# Patient Record
Sex: MALE | Race: WHITE | NOT HISPANIC OR LATINO | URBAN - METROPOLITAN AREA
[De-identification: names, ages, dates, MRNs, and addresses within clinical notes are randomized per-mention and may not be internally consistent; named-entity substitution may affect disease eponyms.]

---

## 2024-11-24 ENCOUNTER — EMERGENCY (EMERGENCY)
Facility: HOSPITAL | Age: 39
LOS: 1 days | Discharge: ANOTHER HOSPITAL | End: 2024-11-24
Attending: EMERGENCY MEDICINE | Admitting: EMERGENCY MEDICINE
Payer: COMMERCIAL

## 2024-11-24 VITALS
RESPIRATION RATE: 18 BRPM | TEMPERATURE: 98 F | HEART RATE: 80 BPM | WEIGHT: 229.28 LBS | DIASTOLIC BLOOD PRESSURE: 79 MMHG | HEIGHT: 73 IN | OXYGEN SATURATION: 99 % | SYSTOLIC BLOOD PRESSURE: 176 MMHG

## 2024-11-24 LAB
ALBUMIN SERPL ELPH-MCNC: 4.1 G/DL — SIGNIFICANT CHANGE UP (ref 3.3–5)
ALP SERPL-CCNC: 55 U/L — SIGNIFICANT CHANGE UP (ref 30–120)
ALT FLD-CCNC: 24 U/L — SIGNIFICANT CHANGE UP (ref 10–60)
ANION GAP SERPL CALC-SCNC: 12 MMOL/L — SIGNIFICANT CHANGE UP (ref 5–17)
APTT BLD: 35.5 SEC — SIGNIFICANT CHANGE UP (ref 24.5–35.6)
AST SERPL-CCNC: 22 U/L — SIGNIFICANT CHANGE UP (ref 10–40)
BASOPHILS # BLD AUTO: 0.04 K/UL — SIGNIFICANT CHANGE UP (ref 0–0.2)
BASOPHILS NFR BLD AUTO: 0.4 % — SIGNIFICANT CHANGE UP (ref 0–2)
BILIRUB SERPL-MCNC: 0.5 MG/DL — SIGNIFICANT CHANGE UP (ref 0.2–1.2)
BUN SERPL-MCNC: 14 MG/DL — SIGNIFICANT CHANGE UP (ref 7–23)
CALCIUM SERPL-MCNC: 9.6 MG/DL — SIGNIFICANT CHANGE UP (ref 8.4–10.5)
CHLORIDE SERPL-SCNC: 105 MMOL/L — SIGNIFICANT CHANGE UP (ref 96–108)
CK MB CFR SERPL CALC: 1.2 NG/ML — SIGNIFICANT CHANGE UP (ref 0–3.6)
CO2 SERPL-SCNC: 28 MMOL/L — SIGNIFICANT CHANGE UP (ref 22–31)
CREAT SERPL-MCNC: 1.07 MG/DL — SIGNIFICANT CHANGE UP (ref 0.5–1.3)
D DIMER BLD IA.RAPID-MCNC: 212 NG/ML DDU — SIGNIFICANT CHANGE UP
EGFR: 89 ML/MIN/1.73M2 — SIGNIFICANT CHANGE UP
EOSINOPHIL # BLD AUTO: 0.13 K/UL — SIGNIFICANT CHANGE UP (ref 0–0.5)
EOSINOPHIL NFR BLD AUTO: 1.3 % — SIGNIFICANT CHANGE UP (ref 0–6)
GLUCOSE SERPL-MCNC: 114 MG/DL — HIGH (ref 70–99)
HCT VFR BLD CALC: 39.4 % — SIGNIFICANT CHANGE UP (ref 39–50)
HGB BLD-MCNC: 13.6 G/DL — SIGNIFICANT CHANGE UP (ref 13–17)
IMM GRANULOCYTES NFR BLD AUTO: 0.1 % — SIGNIFICANT CHANGE UP (ref 0–0.9)
INR BLD: 1.14 RATIO — SIGNIFICANT CHANGE UP (ref 0.85–1.16)
LYMPHOCYTES # BLD AUTO: 3.62 K/UL — HIGH (ref 1–3.3)
LYMPHOCYTES # BLD AUTO: 36.2 % — SIGNIFICANT CHANGE UP (ref 13–44)
MAGNESIUM SERPL-MCNC: 2 MG/DL — SIGNIFICANT CHANGE UP (ref 1.6–2.6)
MCHC RBC-ENTMCNC: 31.1 PG — SIGNIFICANT CHANGE UP (ref 27–34)
MCHC RBC-ENTMCNC: 34.5 G/DL — SIGNIFICANT CHANGE UP (ref 32–36)
MCV RBC AUTO: 90 FL — SIGNIFICANT CHANGE UP (ref 80–100)
MONOCYTES # BLD AUTO: 0.58 K/UL — SIGNIFICANT CHANGE UP (ref 0–0.9)
MONOCYTES NFR BLD AUTO: 5.8 % — SIGNIFICANT CHANGE UP (ref 2–14)
NEUTROPHILS # BLD AUTO: 5.61 K/UL — SIGNIFICANT CHANGE UP (ref 1.8–7.4)
NEUTROPHILS NFR BLD AUTO: 56.2 % — SIGNIFICANT CHANGE UP (ref 43–77)
NRBC # BLD: 0 /100 WBCS — SIGNIFICANT CHANGE UP (ref 0–0)
NT-PROBNP SERPL-SCNC: 10 PG/ML — SIGNIFICANT CHANGE UP (ref 0–125)
PLATELET # BLD AUTO: 329 K/UL — SIGNIFICANT CHANGE UP (ref 150–400)
POTASSIUM SERPL-MCNC: 3.6 MMOL/L — SIGNIFICANT CHANGE UP (ref 3.5–5.3)
POTASSIUM SERPL-SCNC: 3.6 MMOL/L — SIGNIFICANT CHANGE UP (ref 3.5–5.3)
PROT SERPL-MCNC: 7.3 G/DL — SIGNIFICANT CHANGE UP (ref 6–8.3)
PROTHROM AB SERPL-ACNC: 13.2 SEC — SIGNIFICANT CHANGE UP (ref 9.9–13.4)
RBC # BLD: 4.38 M/UL — SIGNIFICANT CHANGE UP (ref 4.2–5.8)
RBC # FLD: 11.8 % — SIGNIFICANT CHANGE UP (ref 10.3–14.5)
SODIUM SERPL-SCNC: 145 MMOL/L — SIGNIFICANT CHANGE UP (ref 135–145)
TROPONIN I, HIGH SENSITIVITY RESULT: 8.2 NG/L — SIGNIFICANT CHANGE UP
WBC # BLD: 9.99 K/UL — SIGNIFICANT CHANGE UP (ref 3.8–10.5)
WBC # FLD AUTO: 9.99 K/UL — SIGNIFICANT CHANGE UP (ref 3.8–10.5)

## 2024-11-24 PROCEDURE — 70450 CT HEAD/BRAIN W/O DYE: CPT | Mod: 26,MC,59

## 2024-11-24 PROCEDURE — 93010 ELECTROCARDIOGRAM REPORT: CPT

## 2024-11-24 PROCEDURE — 70498 CT ANGIOGRAPHY NECK: CPT | Mod: 26,MC

## 2024-11-24 PROCEDURE — 70496 CT ANGIOGRAPHY HEAD: CPT | Mod: 26,MC

## 2024-11-24 PROCEDURE — 99285 EMERGENCY DEPT VISIT HI MDM: CPT

## 2024-11-24 PROCEDURE — 71045 X-RAY EXAM CHEST 1 VIEW: CPT | Mod: 26

## 2024-11-24 RX ORDER — SODIUM CHLORIDE 9 MG/ML
1000 INJECTION, SOLUTION INTRAMUSCULAR; INTRAVENOUS; SUBCUTANEOUS ONCE
Refills: 0 | Status: COMPLETED | OUTPATIENT
Start: 2024-11-24 | End: 2024-11-24

## 2024-11-24 RX ADMIN — SODIUM CHLORIDE 1000 MILLILITER(S): 9 INJECTION, SOLUTION INTRAMUSCULAR; INTRAVENOUS; SUBCUTANEOUS at 22:48

## 2024-11-24 NOTE — ED PROVIDER NOTE - OBJECTIVE STATEMENT
39-year-old male with no significant past medical history presents with complaint of chest pain today.  Patient states that he has had intermittent dull right-sided chest pain radiating to his mid back since 2 PM today.  Denies any chest pain at this time.  States that he has had intermittent numbness to his left jaw and been waking up at night with numbness in his hands and feet for the past 2 to 3 days.  States that the left jaw numbness was more pronounced today with the chest pain and hence came to ER for evaluation.  States that chest pain started this afternoon after he drove back from [place].  States that he has noticed mild swelling of his bilateral lower legs since yesterday.  States mother had MI when she was 48 years old.  States that he has had similar symptoms 7-8 years ago and was seen by cardiologist then and had negative workup.  Denies fever, cough, shortness of breath, palpitations, nausea, vomiting, numbness, tingling, abdominal pain, history of DVT/PE, calf pain. 39-year-old male with no significant past medical history presents with complaint of chest pain today.  Patient states that he has had intermittent dull right-sided chest pain radiating to his mid back since 2 PM today.  Denies any chest pain at this time.  States that he has had intermittent numbness to his left jaw and been waking up at night with numbness in his hands and feet for the past 2 to 3 days.  States that the left jaw numbness was more pronounced today with the chest pain and hence came to ER for evaluation. +intermittent lightheadedness. States that chest pain started this afternoon after he drove back from [place].  States that he has noticed mild swelling of his bilateral lower legs since yesterday.  States mother had MI when she was 48 years old.  States that he has had similar symptoms 7-8 years ago and was seen by cardiologist then and had negative workup.  Denies fever, cough, shortness of breath, palpitations, nausea, vomiting,  tingling, headache, vision changes, focal weakness, speech/gait changes, abdominal pain, history of DVT/PE, calf pain.

## 2024-11-24 NOTE — ED ADULT NURSE NOTE - OBJECTIVE STATEMENT
pt comes to ed c/o intermittent left sided chest pain as well as left jaw and back pain. pt denies current pain at this times. denies sob, nausea, diaphoresis, cardiac hx smoking or other complaints.

## 2024-11-24 NOTE — ED PROVIDER NOTE - PROGRESS NOTE DETAILS
Discussed case with Dr. Wolfe, pt has been accepted for transfer to [facility] (via transfer center, d/w hospitalist and ED Dr. Beal) to have MRI today for r/o CVA, Dr. Wolfe will see patient later in the day for consult

## 2024-11-24 NOTE — ED PROVIDER NOTE - CARE PLAN
Principal Discharge DX:	Facial paresthesia   1 Principal Discharge DX:	Facial paresthesia  Secondary Diagnosis:	Chest pain

## 2024-11-24 NOTE — ED PROVIDER NOTE - ATTESTATION, MLM
Lucila DODSON NP I have reviewed and confirmed nurses' notes for patient's medications, allergies, medical history, and surgical history.

## 2024-11-24 NOTE — ED PROVIDER NOTE - MUSCULOSKELETAL, MLM
Spine appears normal, range of motion is not limited, no muscle or joint tenderness, no calf tenderness or obvious swelling noted to BLE Spine appears normal, range of motion is not limited, no muscle or joint tenderness, no calf tenderness or swelling noted to BLE

## 2024-11-24 NOTE — ED ADULT NURSE REASSESSMENT NOTE - NS ED NURSE REASSESS COMMENT FT1
pt resting comfortably, improvement noted, in no acute distress. Respirations are even and unlabored. pt voices no complaints at this time. plan of care ongoing, no further concerns as of present, pt hemodynamically stable. no acute changes noted, needs attended to, safety maintained, call bell within reach.

## 2024-11-25 ENCOUNTER — EMERGENCY (EMERGENCY)
Facility: HOSPITAL | Age: 39
LOS: 1 days | Discharge: HOME | End: 2024-11-25
Attending: INTERNAL MEDICINE | Admitting: EMERGENCY MEDICINE
Payer: COMMERCIAL

## 2024-11-25 VITALS
OXYGEN SATURATION: 97 % | HEART RATE: 70 BPM | DIASTOLIC BLOOD PRESSURE: 85 MMHG | WEIGHT: 220.02 LBS | TEMPERATURE: 98 F | RESPIRATION RATE: 18 BRPM | HEIGHT: 72 IN | SYSTOLIC BLOOD PRESSURE: 128 MMHG

## 2024-11-25 VITALS
SYSTOLIC BLOOD PRESSURE: 128 MMHG | OXYGEN SATURATION: 99 % | HEART RATE: 78 BPM | TEMPERATURE: 98 F | DIASTOLIC BLOOD PRESSURE: 77 MMHG | RESPIRATION RATE: 18 BRPM

## 2024-11-25 VITALS
OXYGEN SATURATION: 98 % | RESPIRATION RATE: 16 BRPM | SYSTOLIC BLOOD PRESSURE: 115 MMHG | HEART RATE: 71 BPM | TEMPERATURE: 98 F | DIASTOLIC BLOOD PRESSURE: 71 MMHG

## 2024-11-25 LAB — TROPONIN I, HIGH SENSITIVITY RESULT: 9.1 NG/L — SIGNIFICANT CHANGE UP

## 2024-11-25 PROCEDURE — 83880 ASSAY OF NATRIURETIC PEPTIDE: CPT

## 2024-11-25 PROCEDURE — 85025 COMPLETE CBC W/AUTO DIFF WBC: CPT

## 2024-11-25 PROCEDURE — 85379 FIBRIN DEGRADATION QUANT: CPT

## 2024-11-25 PROCEDURE — 80053 COMPREHEN METABOLIC PANEL: CPT

## 2024-11-25 PROCEDURE — 99285 EMERGENCY DEPT VISIT HI MDM: CPT

## 2024-11-25 PROCEDURE — A9579: CPT

## 2024-11-25 PROCEDURE — 71045 X-RAY EXAM CHEST 1 VIEW: CPT

## 2024-11-25 PROCEDURE — 93005 ELECTROCARDIOGRAM TRACING: CPT

## 2024-11-25 PROCEDURE — 99285 EMERGENCY DEPT VISIT HI MDM: CPT | Mod: 25

## 2024-11-25 PROCEDURE — 85610 PROTHROMBIN TIME: CPT

## 2024-11-25 PROCEDURE — 83735 ASSAY OF MAGNESIUM: CPT

## 2024-11-25 PROCEDURE — 70450 CT HEAD/BRAIN W/O DYE: CPT | Mod: MC

## 2024-11-25 PROCEDURE — 96361 HYDRATE IV INFUSION ADD-ON: CPT

## 2024-11-25 PROCEDURE — 70498 CT ANGIOGRAPHY NECK: CPT | Mod: MC

## 2024-11-25 PROCEDURE — 72156 MRI NECK SPINE W/O & W/DYE: CPT | Mod: MC

## 2024-11-25 PROCEDURE — 36415 COLL VENOUS BLD VENIPUNCTURE: CPT

## 2024-11-25 PROCEDURE — 85730 THROMBOPLASTIN TIME PARTIAL: CPT

## 2024-11-25 PROCEDURE — 93010 ELECTROCARDIOGRAM REPORT: CPT

## 2024-11-25 PROCEDURE — 96360 HYDRATION IV INFUSION INIT: CPT | Mod: XU

## 2024-11-25 PROCEDURE — 70496 CT ANGIOGRAPHY HEAD: CPT | Mod: MC

## 2024-11-25 PROCEDURE — 82553 CREATINE MB FRACTION: CPT

## 2024-11-25 PROCEDURE — 99284 EMERGENCY DEPT VISIT MOD MDM: CPT | Mod: 25

## 2024-11-25 PROCEDURE — 72156 MRI NECK SPINE W/O & W/DYE: CPT | Mod: 26,MC

## 2024-11-25 PROCEDURE — 96374 THER/PROPH/DIAG INJ IV PUSH: CPT | Mod: XU

## 2024-11-25 PROCEDURE — 70553 MRI BRAIN STEM W/O & W/DYE: CPT | Mod: 26,MC

## 2024-11-25 PROCEDURE — 84484 ASSAY OF TROPONIN QUANT: CPT

## 2024-11-25 PROCEDURE — 70553 MRI BRAIN STEM W/O & W/DYE: CPT | Mod: MC

## 2024-11-25 RX ORDER — DIAZEPAM 10 MG/1
5 FILM BUCCAL ONCE
Refills: 0 | Status: DISCONTINUED | OUTPATIENT
Start: 2024-11-25 | End: 2024-11-25

## 2024-11-25 RX ORDER — ASPIRIN/MAG CARB/ALUMINUM AMIN 325 MG
324 TABLET ORAL ONCE
Refills: 0 | Status: COMPLETED | OUTPATIENT
Start: 2024-11-25 | End: 2024-11-25

## 2024-11-25 RX ADMIN — SODIUM CHLORIDE 1000 MILLILITER(S): 9 INJECTION, SOLUTION INTRAMUSCULAR; INTRAVENOUS; SUBCUTANEOUS at 01:01

## 2024-11-25 RX ADMIN — DIAZEPAM 5 MILLIGRAM(S): 10 FILM BUCCAL at 11:37

## 2024-11-25 RX ADMIN — Medication 324 MILLIGRAM(S): at 01:05

## 2024-11-25 NOTE — CONSULT NOTE ADULT - ATTENDING COMMENTS
38 yo male w/o significant pmh presenting with facial paresthesias and atypical chest pain.    - Atypical chest pain  - No clear evidence of acute ischemia, trops negative x 2.  - Biomarker trend is not consistent with plaque rupture but rather demand ischemia.   - s/p ASA 324mg  - Last followed up with a cardiologist in [place] and states he had "normal" Echocardiogram and Exercise stress test.  - Patient will need outpatient follow up as he no longer has a Cardiologist since moving to [place].

## 2024-11-25 NOTE — ED PROVIDER NOTE - PATIENT PORTAL LINK FT
You can access the FollowMyHealth Patient Portal offered by [facility] by registering at the following website: [email or web address]. By joining [employer]’s FollowMyHealth portal, you will also be able to view your health information using other applications (apps) compatible with our system.

## 2024-11-25 NOTE — CHART NOTE - NSCHARTNOTEFT_GEN_A_CORE
39 Y.O.M transferred from [facility] to obtain MRI as unavailable in [facility] currently   pt had numbness on L side of face on and off since  and some numbness of toes since also Friday on and off also complained that he primarily presented to [facility] as he experienced chest pain moving to back that occurred associated with some dizziness  , no associated nausea, vomiting , palpitations    not alcoholic , no smoking , No PMH , the risk factor is FH mother  of heart attack at age of 48  EKG done in [place] 2 times both NSR with no ST-T wave changes   troponin negative x2   CT head w contrast and CTA neck done in [place] are negative for any findings  currently in ED patient stated that his numbness as well as chest pain resolved   patient pending neuro eval , MRI and cardio to assess if patient will require admission 39 Y.O.M transferred from [facility] to obtain MRI as unavailable in [facility] currently   pt had numbness on L side of face on and off since  and some numbness of toes since also Friday on and off also complained that he primarily presented to [facility] as he experienced chest pain moving to back that occurred associated with some dizziness  , no associated nausea, vomiting , palpitations    not alcoholic , no smoking , No PMH , the risk factor is FH mother  of heart attack at age of 48  EKG done in [place] 2 times both NSR with no ST-T wave changes   troponin negative x2   CT head w contrast and CTA neck done in [place] are negative for any findings  currently in ED patient stated that his numbness as well as chest pain resolved   on exam patient neurologically intact , sensation intact , no focal deficit   lung clear breath sounds b/l , heart s1/s2 present , no murmur , rubs or gallops   abd: soft not tender or distended   ext: no edema or cyanosis , strength in all extremities 5/5 , sensation intact  PERRL , EMOI , vision intact   no rashes   patient pending neuro eval , MRI and cardio to assess if patient will require admission

## 2024-11-25 NOTE — ED PROVIDER NOTE - CARE PROVIDER_API CALL
Hua Abreu-Pineda  Neurology  [address] [place] [id number]  Phone: [phone]  Fax: [phone]  Follow Up Time:

## 2024-11-25 NOTE — CONSULT NOTE ADULT - SUBJECTIVE AND OBJECTIVE BOX
[facility]         Siddhartha Mckeon Patel, Savella, Virgilio      [phone] (office)    Reason for Consult: Chest Pain    Interval HPI: Patient seen and examined at bedside. Patient states that he had traveled to [place] 4 days ago by car to see a concert. States that he was very active in his stay there and drove back to [place] yesterday. States that he started to notice dull chest pain at the lower aspect of the right side of his sternum. States the pain lasted for a few minutes and happened spontaneously. Did not notice any alleviating/exacerbating factor. States that prior to that in the day he was on an exercise bike and had no difficulty before or after the workout. States that his mother passed away of an AMI at age 48 so he used to see Cardiology when he lived in [place] 8-9 years ago and had normal stress tests and echocardiograms but has not followed up since. States that late yesterday into today, he develop jaw "tingling" which last multiple hours. States that he never develop SOB, palpitations, dizziness, lightheadedness, abd pain, n/v/d/c.    HPI:  39-year-old male with no significant past medical history presents with complaint of chest pain today.  Patient states that he has had intermittent dull right-sided chest pain radiating to his mid back since 2 PM today.  Denies any chest pain at this time.  States that he has had intermittent numbness to his left jaw and been waking up at night with numbness in his hands and feet for the past 2 to 3 days.  States that the left jaw numbness was more pronounced today.  The patient was first evaluated at [facility], and found to have normal ekg and troponin x 2 , CT angio  head and neck normal, Neuro was consulted and decision was made for transfer to   to obtain MRI      PAST MEDICAL & SURGICAL HISTORY:      SOCIAL HISTORY: No active tobacco, alcohol or illicit drug use    FAMILY HISTORY:      Home Medications:      MEDICATIONS  (STANDING):    MEDICATIONS  (PRN):      Allergies      Intolerances        REVIEW OF SYSTEMS: Negative except as per HPI.    VITAL SIGNS:   Vital Signs Last 24 Hrs  T(C): 36.6 (25 Nov 2024 07:37), Max: 36.6 (25 Nov 2024 03:21)  T(F): 97.8 (25 Nov 2024 07:37), Max: 97.8 (25 Nov 2024 03:21)  HR: 63 (25 Nov 2024 07:37) (63 - 70)  BP: 120/79 (25 Nov 2024 07:37) (120/79 - 128/85)  BP(mean): --  RR: 18 (25 Nov 2024 07:37) (18 - 18)  SpO2: 98% (25 Nov 2024 07:37) (97% - 98%)    Parameters below as of 25 Nov 2024 07:37  Patient On (Oxygen Delivery Method): room air        I&O's Summary      PHYSICAL EXAM:  Constitutional: NAD, well-developed  HEENT NC/AT, moist mucous membranes  Pulmonary: Non-labored, breath sounds are clear bilaterally, no wheezing, rales or rhonchi  Cardiovascular: +S1, S2, RRR, no murmur  Gastrointestinal: Soft, nontender, nondistended, normoactive bowel sounds  Extremities: No peripheral edema   Neurological: Alert, strength and sensitivity are grossly intact  Skin: No obvious lesions/rashes  Psych: Mood & affect appropriate    LABS: All Labs Reviewed:          EKG:  NSR @ 62bpm      RADIOLOGY:  CTA Head and Neck: Negative for significant stenosis, acute bleed.

## 2024-11-25 NOTE — ED ADULT NURSE REASSESSMENT NOTE - NS ED NURSE REASSESS COMMENT FT1
Pt received resring on stretcher in n oa apparent distress. All need anticipated. Awaiting MRI, no concerns voiced

## 2024-11-25 NOTE — ED PROVIDER NOTE - NSFOLLOWUPINSTRUCTIONS_ED_ALL_ED_FT
-- You should update your primary care physician on your Emergency Department visit and follow up with them.  If you do not have a physician or have difficulty following up, please call: [phone] to obtain a [facility] doctor or specialist who can provide follow up.    -- Return to the ER for worsening or persistent symptoms, and/or ANY NEW OR CONCERNING SYMPTOMS.    -- Follow up with neurology referral.

## 2024-11-25 NOTE — ED PROVIDER NOTE - OBJECTIVE STATEMENT
BIBA Transfer from [facility] for MRI for left facial numbness.  multiple medical complaints BIBA Transfer from [facility] for MRI for left facial numbness.  39-year-old male with no significant past medical history presents with complaint of chest pain today.  Patient states that he has had intermittent dull right-sided chest pain radiating to his mid back since 2 PM today.  Denies any chest pain at this time.  States that he has had intermittent numbness to his left jaw and been waking up at night with numbness in his hands and feet for the past 2 to 3 days.  States that the left jaw numbness was more pronounced today.  The patient was first evaluated at [facility], and found to have normal ekg and troponin x 2 , CT angio  head and neck normal, Neuro was consulted and decision was made for transfer to   to obtain MRI

## 2024-11-25 NOTE — CONSULT NOTE ADULT - ASSESSMENT
Assessment: 38 yo male w/o significant pmh presenting with facial paresthesias and atypical chest pain.    Plan:   - Atypical chest pain dull in nature w/o exacerbating/palliating factors.   - No clear evidence of acute ischemia, trops negative x 2.  - His CKs are flat, suggesting against acute atherosclerotic plaque rupture.  - Biomarker trend is not consistent with plaque rupture but rather demand ischemia. Monitor closely for the development of anginal symptoms or clinical signs of ischemia.   - s/p ASA 324mg  - Risk factors: Mother passed away of MI at age 48, Denies any smoking/tobacco use, alcohol use, illicit drug use.  - Last followed up with a cardiologist in [place] and states he had "normal" Echocardiogram and Exercise stress test.    - No acute changes on EKG compared to previous.    - No meaningful evidence of volume overload.    - BP well controlled, monitor routine hemodynamics.    - Monitor and replete lytes, keep K>4, Mg>2.    - CTA Head and Neck negative. D-Dimer negative.  - Consider neurologic etiology of paresthesias.  - Other cardiovascular workup will depend on clinical course.  - Patient will need outpatient follow up as he no longer has a Cardiologist since moving to [place].

## 2024-11-25 NOTE — ED PROVIDER NOTE - CLINICAL SUMMARY MEDICAL DECISION MAKING FREE TEXT BOX
BIBA Transfer from [facility] for MRI for left facial numbness.  39-year-old male with no significant past medical history presents with complaint of chest pain today.  Patient states that he has had intermittent dull right-sided chest pain radiating to his mid back since 2 PM today.  Denies any chest pain at this time.  States that he has had intermittent numbness to his left jaw and been waking up at night with numbness in his hands and feet for the past 2 to 3 days.  States that the left jaw numbness was more pronounced today.  The patient was first evaluated at [facility], and found to have normal ekg and troponin x 2 , CT angio  head and neck normal, Neuro was consulted and decision was made for transfer to   to obtain MRI

## 2024-11-25 NOTE — ED ADULT NURSE NOTE - NSFALLUNIVINTERV_ED_ALL_ED
Bed/Stretcher in lowest position, wheels locked, appropriate side rails in place/Call bell, personal items and telephone in reach/Instruct patient to call for assistance before getting out of bed/chair/stretcher/Non-slip footwear applied when patient is off stretcher/[place] to call system/Physically safe environment - no spills, clutter or unnecessary equipment/Purposeful proactive rounding/Room/bathroom lighting operational, light cord in reach

## 2025-01-10 ENCOUNTER — EMERGENCY (EMERGENCY)
Facility: HOSPITAL | Age: 40
LOS: 1 days | Discharge: HOME | End: 2025-01-10
Attending: STUDENT IN AN ORGANIZED HEALTH CARE EDUCATION/TRAINING PROGRAM | Admitting: EMERGENCY MEDICINE
Payer: COMMERCIAL

## 2025-01-10 VITALS
TEMPERATURE: 98 F | DIASTOLIC BLOOD PRESSURE: 95 MMHG | HEIGHT: 72 IN | HEART RATE: 69 BPM | RESPIRATION RATE: 18 BRPM | WEIGHT: 210.1 LBS | SYSTOLIC BLOOD PRESSURE: 173 MMHG | OXYGEN SATURATION: 98 %

## 2025-01-10 PROCEDURE — 99285 EMERGENCY DEPT VISIT HI MDM: CPT

## 2025-01-10 NOTE — ED ADULT NURSE NOTE - OBJECTIVE STATEMENT
Pt states he was informed of a CAD diagnosis 2 weeks ago. Was informed he had 50% blockage in LAD, mother  of heart attack. Stated since diagnosis have been fearful, Has known history of anxiety, Has been prescribed Lexapro, but was unsure if he could take it with other prescribed meds. Stated he has mild SOB past 2 days with chest pain mid chest non radiating. denies fever or chills. denies nausea or vomiting. Denies weakness or palpitations.

## 2025-01-10 NOTE — ED ADULT NURSE NOTE - NSFALLRISKINTERV_ED_ALL_ED

## 2025-01-10 NOTE — ED ADULT TRIAGE NOTE - NS ED TRIAGE AVPU SCALE
IV Contrast- Discharge Instructions After Your CT Scan      The IV contrast you received today will be filtered from your bloodstream by your kidneys during the next 24 hours and pass from the body in urine.  You will not be aware of this process and your urine will not change in color.  To help this process you should drink at least 4 additional glasses of water or juice today.  This reduces stress on your kidneys.    Most contrast reactions are immediate.  Should you develop symptoms of concern after discharge, contact the department at the number below.  After hours you should contact your personal physician.  If you develop breathing distress or wheezing, call 911.      1.  Has the patient had a previous reaction to IV contrast? n    2.  Does the patient have kidney disease? n    3.  Is the patient on dialysis? n    If YES to any of these questions, exam will be reviewed with a Radiologist before administering contrast.       Alert-The patient is alert, awake and responds to voice. The patient is oriented to time, place, and person. The triage nurse is able to obtain subjective information.

## 2025-01-11 VITALS
TEMPERATURE: 98 F | RESPIRATION RATE: 16 BRPM | SYSTOLIC BLOOD PRESSURE: 144 MMHG | DIASTOLIC BLOOD PRESSURE: 91 MMHG | OXYGEN SATURATION: 100 % | HEART RATE: 56 BPM

## 2025-01-11 LAB
ALBUMIN SERPL ELPH-MCNC: 4.5 G/DL — SIGNIFICANT CHANGE UP (ref 3.3–5)
ALP SERPL-CCNC: 49 U/L — SIGNIFICANT CHANGE UP (ref 40–120)
ALT FLD-CCNC: 25 U/L — SIGNIFICANT CHANGE UP (ref 12–78)
ANION GAP SERPL CALC-SCNC: 3 MMOL/L — LOW (ref 5–17)
AST SERPL-CCNC: 17 U/L — SIGNIFICANT CHANGE UP (ref 15–37)
BASOPHILS # BLD AUTO: 0.02 K/UL — SIGNIFICANT CHANGE UP (ref 0–0.2)
BASOPHILS NFR BLD AUTO: 0.3 % — SIGNIFICANT CHANGE UP (ref 0–2)
BILIRUB SERPL-MCNC: 0.5 MG/DL — SIGNIFICANT CHANGE UP (ref 0.2–1.2)
BUN SERPL-MCNC: 12 MG/DL — SIGNIFICANT CHANGE UP (ref 7–23)
CALCIUM SERPL-MCNC: 9.5 MG/DL — SIGNIFICANT CHANGE UP (ref 8.5–10.1)
CHLORIDE SERPL-SCNC: 107 MMOL/L — SIGNIFICANT CHANGE UP (ref 96–108)
CO2 SERPL-SCNC: 29 MMOL/L — SIGNIFICANT CHANGE UP (ref 22–31)
CREAT SERPL-MCNC: 0.88 MG/DL — SIGNIFICANT CHANGE UP (ref 0.5–1.3)
EGFR: 111 ML/MIN/1.73M2 — SIGNIFICANT CHANGE UP
EOSINOPHIL # BLD AUTO: 0.05 K/UL — SIGNIFICANT CHANGE UP (ref 0–0.5)
EOSINOPHIL NFR BLD AUTO: 0.6 % — SIGNIFICANT CHANGE UP (ref 0–6)
GLUCOSE SERPL-MCNC: 99 MG/DL — SIGNIFICANT CHANGE UP (ref 70–99)
HCT VFR BLD CALC: 40.9 % — SIGNIFICANT CHANGE UP (ref 39–50)
HGB BLD-MCNC: 14 G/DL — SIGNIFICANT CHANGE UP (ref 13–17)
IMM GRANULOCYTES NFR BLD AUTO: 0.3 % — SIGNIFICANT CHANGE UP (ref 0–0.9)
LYMPHOCYTES # BLD AUTO: 2.72 K/UL — SIGNIFICANT CHANGE UP (ref 1–3.3)
LYMPHOCYTES # BLD AUTO: 34.5 % — SIGNIFICANT CHANGE UP (ref 13–44)
MCHC RBC-ENTMCNC: 31.6 PG — SIGNIFICANT CHANGE UP (ref 27–34)
MCHC RBC-ENTMCNC: 34.2 G/DL — SIGNIFICANT CHANGE UP (ref 32–36)
MCV RBC AUTO: 92.3 FL — SIGNIFICANT CHANGE UP (ref 80–100)
MONOCYTES # BLD AUTO: 0.51 K/UL — SIGNIFICANT CHANGE UP (ref 0–0.9)
MONOCYTES NFR BLD AUTO: 6.5 % — SIGNIFICANT CHANGE UP (ref 2–14)
NEUTROPHILS # BLD AUTO: 4.57 K/UL — SIGNIFICANT CHANGE UP (ref 1.8–7.4)
NEUTROPHILS NFR BLD AUTO: 57.8 % — SIGNIFICANT CHANGE UP (ref 43–77)
NRBC # BLD: 0 /100 WBCS — SIGNIFICANT CHANGE UP (ref 0–0)
PLATELET # BLD AUTO: 341 K/UL — SIGNIFICANT CHANGE UP (ref 150–400)
POTASSIUM SERPL-MCNC: 4.6 MMOL/L — SIGNIFICANT CHANGE UP (ref 3.5–5.3)
POTASSIUM SERPL-SCNC: 4.6 MMOL/L — SIGNIFICANT CHANGE UP (ref 3.5–5.3)
PROT SERPL-MCNC: 7.5 G/DL — SIGNIFICANT CHANGE UP (ref 6–8.3)
RBC # BLD: 4.43 M/UL — SIGNIFICANT CHANGE UP (ref 4.2–5.8)
RBC # FLD: 12 % — SIGNIFICANT CHANGE UP (ref 10.3–14.5)
SODIUM SERPL-SCNC: 139 MMOL/L — SIGNIFICANT CHANGE UP (ref 135–145)
TROPONIN I, HIGH SENSITIVITY RESULT: 5.2 NG/L — SIGNIFICANT CHANGE UP
WBC # BLD: 7.89 K/UL — SIGNIFICANT CHANGE UP (ref 3.8–10.5)
WBC # FLD AUTO: 7.89 K/UL — SIGNIFICANT CHANGE UP (ref 3.8–10.5)

## 2025-01-11 PROCEDURE — 85025 COMPLETE CBC W/AUTO DIFF WBC: CPT

## 2025-01-11 PROCEDURE — 80053 COMPREHEN METABOLIC PANEL: CPT

## 2025-01-11 PROCEDURE — 93010 ELECTROCARDIOGRAM REPORT: CPT

## 2025-01-11 PROCEDURE — 71045 X-RAY EXAM CHEST 1 VIEW: CPT

## 2025-01-11 PROCEDURE — 71045 X-RAY EXAM CHEST 1 VIEW: CPT | Mod: 26

## 2025-01-11 PROCEDURE — 36415 COLL VENOUS BLD VENIPUNCTURE: CPT

## 2025-01-11 PROCEDURE — 84484 ASSAY OF TROPONIN QUANT: CPT

## 2025-01-11 PROCEDURE — 93005 ELECTROCARDIOGRAM TRACING: CPT

## 2025-01-11 PROCEDURE — 99285 EMERGENCY DEPT VISIT HI MDM: CPT | Mod: 25

## 2025-01-11 NOTE — ED PROVIDER NOTE - IV ALTEPLASE DOOR HIDDEN
Quality 131: Pain Assessment And Follow-Up: Pain assessment using a standardized tool is documented as negative, no follow-up plan required Quality 226: Preventive Care And Screening: Tobacco Use: Screening And Cessation Intervention: Patient screened for tobacco use and is an ex/non-smoker Quality 110: Preventive Care And Screening: Influenza Immunization: Influenza Immunization previously received during influenza season Quality 130: Documentation Of Current Medications In The Medical Record: Current Medications Documented Detail Level: Detailed show

## 2025-01-11 NOTE — ED PROVIDER NOTE - CLINICAL SUMMARY MEDICAL DECISION MAKING FREE TEXT BOX
[place] ATTG  39 M w/ recent dx of cad 50% stenosis 2 weeks ago pt has family hx of cad, pt has been feeling anxious about such, experienced some anxiety he said which caused him cp for the past 2-3 days  minimal active symptoms       GENERAL: Awake, alert, NAD  LUNGS:  non labored breathing   ABDOMEN: Soft, , non tender, non distended, no rebound, no guarding  BACK: No midline spinal tenderness,  EXT: No edema, no calf tenderness no deformities.  NEURO: A&Ox3. Moving all extremities.  SKIN: Warm and dry. No rash.  PSYCH: Normal affect.     given hx and pe w/ cp going on > 3 hours single trop dc rec card fu

## 2025-01-11 NOTE — ED ADULT NURSE REASSESSMENT NOTE - NS ED NURSE REASSESS COMMENT FT1
Discharged with instruct to follow up with PCP. Return to ED if symptoms return> verbalized understanding

## 2025-01-11 NOTE — ED PROVIDER NOTE - CARE PROVIDER_API CALL
Go Carl  Cardiology  [address] [place] [address]  Phone: [phone]  Fax: [phone]  Follow Up Time: 4-6 Days

## 2025-01-11 NOTE — ED ADULT NURSE REASSESSMENT NOTE - NSFALLRISK_ED_ALL_ED
OFFICE VISIT    Patient: Supa Santiago   : 1981 MRN: [id number]    SUBJECTIVE:  Chief Complaint   Patient presents with   • Follow-up       Patient has given consent to record this visit for documentation in their clinical record.    A 41 year old male presents for follow-up of multiple medical conditions.        HISTORY OF PRESENT ILLNESS:  Historian: Self.     Vaccination: Due for Hepatitis-B and Influenza vaccine. He would like to get both vaccines done today. He is declining his COVID booster.      RUQ pain: Occasional stomach pain and heartburn. Previous Ultrasound Abdomen revealed no abnormalities. Had gluten free chicken nuggets for lunch. Denies nausea or vomiting. Has gallbladder issues. He is wondering what more can be done as it seems to be getting worse.      Diarrhea: Reports diarrhea, couple of times a week, otherwise his bowel movements are normal. Denies difficulty urinating.     Snoring: Snores while sleeping. No difficulty in breathing during sleep. Was scheduled for a sleep study but was denied by insurance. I discussed trying to do a home sleep study and see if his insurance would cover this.      Excessive daytime sleepiness: Experiences fatigue during the day and requires a nap. Was scheduled for a sleep study but was denied by insurance.      Headache: Occasional headaches on waking up in the morning.   Additional comments    Genital herpes: Taking Valacyclovir occasionally. Flare-ups minimal.      Anxiety: LANI-7 score is 12. Unsure which medication was prescribed earlier.      Social history: Non-alcoholic.        PAST MEDICAL HISTORY:  Past Medical History:   Diagnosis Date   • Gastroesophageal reflux disease    • PONV (postoperative nausea and vomiting)      MEDICATIONS:  Current Outpatient Medications   Medication Sig Dispense Refill   • traMADol (ULTRAM) 50 MG tablet Take 1 tablet by mouth every 6 hours as needed for Pain. 15 tablet 0   • busPIRone (BUSPAR) 15 MG tablet Take 1 tablet  by mouth in the morning and 1 tablet in the evening. 60 tablet 1   • zaleplon (SONATA) 5 MG capsule Take 1 capsule by mouth before bed for sleep study. May take second dose if 4 hours of sleep remain. 2 capsule 0   • omeprazole (PrilOSEC) 20 MG capsule Take 1 capsule by mouth 2 times daily. 180 capsule 3   • atorvastatin (LIPITOR) 20 MG tablet Take 1 tablet by mouth daily. 90 tablet 3   • COVID-19 mRNA, Pfizer, (Pfizer-Veysoft COVID-19 Vacc) 30 MCG/0.3ML vaccine Inject 0.3 mLs into the muscle. 0.3 mL 0     No current facility-administered medications for this visit.     ALLERGIES:  Allergies as of 01/10/2023 - Reviewed 01/10/2023   Allergen Reaction Noted   • Ezetimibe Other (See Comments) 03/09/2018   • Rosuvastatin MYALGIA 03/09/2018     FAMILY HISTORY:  Family History   Problem Relation Age of Onset   • Hyperlipidemia Mother    • Cancer Mother    • Dementia/Alzheimers Mother    • Hypertension Mother    • Depression Father    • Diabetes Father    • Hypertension Father      SOCIAL HISTORY:  Social History     Tobacco Use   • Smoking status: Never   • Smokeless tobacco: Never   Vaping Use   • Vaping Use: never used   Substance Use Topics   • Alcohol use: Not Currently   • Drug use: Never     Past Surgical HISTORY  Past Surgical History:   Procedure Laterality Date   • Appendectomy     • Egd  03/08/2022    + Lim's, repeat EGD in 2 yrs   • Hernia repair         REVIEW OF SYSTEMS:  Neuro: As per HPI.  GI: As per HPI.  : As per HPI.  Psych: As per HPI.      OBJECTIVE:  Visit Vitals  /80 (BP Location: LUE - Left upper extremity, Patient Position: Sitting, Cuff Size: Regular)   Pulse 76   Temp 98.2 °F (36.8 °C) (Temporal)   Ht 5' 7\" (1.702 m)   Wt 88 kg (194 lb)   SpO2 96%   BMI 30.38 kg/m²       PHYSICAL EXAM:  Constitutional: In no acute distress. Well-developed.   Eyes: The conjunctiva exhibited no abnormalities. The sclera was normal.   Pulmonary: Breath sounds clear to auscultation bilaterally, but no  respiratory distress and normal respiratory rate and effort.  Cardiovascular: normal rate, no murmurs were heard, regular rhythm, normal S1 and normal S2. edema was not present in the lower extremities.     Abdomen: (+) Pain on taking a deep breath. Weak abdominal muscles. Nondistended, normal bowel sounds and no abdominal mass. No hepatomegaly and no splenomegaly. No umbilical hernia was discovered. RUQ tenderness with palpation.  Psychiatric: (+) Anxiety. Oriented to time, place, and person. The mood was normal. The affect was normal. The memory was unimpaired.   Skin: Skin moisture and turgor normal.      DIAGNOSTIC STUDIES:  LAB RESULTS:  No visits with results within 1 Month(s) from this visit.   Latest known visit with results is:   Lab Services on 08/16/2022   Component Date Value Ref Range Status   • Vitamin B12 08/16/2022 784  211 - 911 pg/mL Final   • Vitamin D, 25-Hydroxy 08/16/2022 31.6  30.0 - 100.0 ng/mL Final   • Hemoglobin A1C 08/16/2022 5.6  4.5 - 5.6 % Final   • Cholesterol 08/16/2022 191  <=199 mg/dL Final   • Triglycerides 08/16/2022 226 (A)  <=149 mg/dL Final   • HDL 08/16/2022 37 (A)  >=40 mg/dL Final   • LDL 08/16/2022 109  <=129 mg/dL Final   • Non-HDL Cholesterol 08/16/2022 154  mg/dL Final   • Cholesterol/ HDL Ratio 08/16/2022 5.2 (A)  <=4.4 Final   • Sodium 08/16/2022 139  135 - 145 mmol/L Final   • Potassium 08/16/2022 4.0  3.4 - 5.1 mmol/L Final   • Chloride 08/16/2022 106  97 - 110 mmol/L Final   • Carbon Dioxide 08/16/2022 26  21 - 32 mmol/L Final   • Anion Gap 08/16/2022 11  7 - 19 mmol/L Final   • Glucose 08/16/2022 82  70 - 99 mg/dL Final   • BUN 08/16/2022 15  6 - 20 mg/dL Final   • Creatinine 08/16/2022 1.01  0.67 - 1.17 mg/dL Final   • Glomerular Filtration Rate 08/16/2022 >90  >=60 Final   • BUN/ Creatinine Ratio 08/16/2022 15  7 - 25 Final   • Calcium 08/16/2022 9.3  8.4 - 10.2 mg/dL Final   • Bilirubin, Total 08/16/2022 1.0  0.2 - 1.0 mg/dL Final   • GOT/AST 08/16/2022 64 (A)   <=37 Units/L Final   • GPT/ALT 08/16/2022 158 (A)  <64 Units/L Final   • Alkaline Phosphatase 08/16/2022 75  45 - 117 Units/L Final   • Albumin 08/16/2022 4.1  3.6 - 5.1 g/dL Final   • Protein, Total 08/16/2022 7.5  6.4 - 8.2 g/dL Final   • Globulin 08/16/2022 3.4  2.0 - 4.0 g/dL Final   • A/G Ratio 08/16/2022 1.2  1.0 - 2.4 Final   • TSH 08/16/2022 1.006  0.350 - 5.000 mcUnits/mL Final   • WBC 08/16/2022 6.5  4.2 - 11.0 K/mcL Final   • RBC 08/16/2022 5.08  4.50 - 5.90 mil/mcL Final   • HGB 08/16/2022 14.8  13.0 - 17.0 g/dL Final   • HCT 08/16/2022 45.3  39.0 - 51.0 % Final   • MCV 08/16/2022 89.2  78.0 - 100.0 fl Final   • MCH 08/16/2022 29.1  26.0 - 34.0 pg Final   • MCHC 08/16/2022 32.7  32.0 - 36.5 g/dL Final   • RDW-CV 08/16/2022 12.3  11.0 - 15.0 % Final   • RDW-SD 08/16/2022 40.4  39.0 - 50.0 fL Final   • PLT 08/16/2022 202  140 - 450 K/mcL Final   • NRBC 08/16/2022 0  <=0 /100 WBC Final   • Neutrophil, Percent 08/16/2022 47  % Final   • Lymphocytes, Percent 08/16/2022 41  % Final   • Mono, Percent 08/16/2022 9  % Final   • Eosinophils, Percent 08/16/2022 2  % Final   • Basophils, Percent 08/16/2022 1  % Final   • Immature Granulocytes 08/16/2022 0  % Final   • Absolute Neutrophils 08/16/2022 3.1  1.8 - 7.7 K/mcL Final   • Absolute Lymphocytes 08/16/2022 2.6  1.0 - 4.8 K/mcL Final   • Absolute Monocytes 08/16/2022 0.6  0.3 - 0.9 K/mcL Final   • Absolute Eosinophils  08/16/2022 0.1  0.0 - 0.5 K/mcL Final   • Absolute Basophils 08/16/2022 0.1  0.0 - 0.3 K/mcL Final   • Absolute Immmature Granulocytes 08/16/2022 0.0  0.0 - 0.2 K/mcL Final       ASSESSMENT AND PLAN:  This 41 year old male presents with :  1. Need for vaccination    2. Generalized abdominal pain    3. Abnormal biliary HIDA scan        Orders Placed This Encounter   • HEP B VACC ADULT 3 DOSE, IM       PLAN:  Need for vaccination:  Ordered INFLUENZA QUADRIVALENT SPLIT PRES FREE 0.5 ML VACC, IM (FLULAVAL,FLUARIX,FLUZONE).  Ordered HEP B VACC ADULT 3  DOSE, IM.     RUQ pain:  Possible gallbladder issues.  Reviewed and discussed previous Ultrasound Abdomen.  Reviewed HIDA scan results.  Recommended avoiding fried and fatty foods.  Surgical consult as directed.     Diarrhea, unspecified type:  Reviewed HIDA scan results.   Surgical consult as directed.      Snoring:  Ordered SLEEP STUDY HOME 4 CHANNEL PORTABLE UNATTENDED.     Excessive daytime sleepiness:  Ordered SLEEP STUDY HOME 4 CHANNEL PORTABLE UNATTENDED.     Other headache syndrome:  Ordered SLEEP STUDY HOME 4 CHANNEL PORTABLE UNATTENDED.     Additional plan:  Anxiety:   Discussed previous Anxiety scores, LANI-7 score ranges 12.  Prescribed Buspirone 15 mg; twice daily.     Follow up one month or sooner if needed.    Total time spent today on this visit  is  30 minutes which includes  preparing to see the patient by reviewing prior records, obtaining and reviewing history, performing a physical exam, counseling the patient ,documenting clinical information in the medical record,ordering medications/tests/procedures and coordinating care      Refer to orders.  Medical compliance with plan discussed and risks of non-compliance reviewed.  Patient education completed on disease process, etiology & prognosis.  Proper usage and side effects of medications reviewed & discussed.  Return to clinic as clinically indicated as discussed with the patient who verbalized understanding of the plan and is in agreement with the plan.    I,  Dr. FABIEN Cueva, have created a visit summary document based on the audio recording between  AIDAN Sahu and this patient for the physician to review, edit as needed, and authenticate.    Creation Date: 12/10/2022      I have reviewed and edited the visit summary above and attest that it is accurate.    Sandie BERMUDEZ   No

## 2025-01-11 NOTE — ED PROVIDER NOTE - NSFOLLOWUPINSTRUCTIONS_ED_ALL_ED_FT
Chest pain can be caused by a range of conditions, from not serious to life-threatening. Chest pain can be a symptom of a digestive problem, such as acid reflux or a stomach ulcer. An anxiety attack or a strong emotion, such as anger, can also cause chest pain. Infection, inflammation, or a fracture in the bones or cartilage in your chest can cause pain or discomfort. Sometimes chest pain or pressure is caused by poor blood flow to your heart (angina). Chest pain may also be caused by life-threatening conditions such as a heart attack or blood clot in your lungs.    DISCHARGE INSTRUCTIONS:    Call your local emergency number (911 in the ) or have someone call if:    You have any of the following signs of a heart attack:  Squeezing, pressure, or pain in your chest    You may also have any of the following:  Discomfort or pain in your back, neck, jaw, stomach, or arm    Shortness of breath    Nausea or vomiting    Lightheadedness or a sudden cold sweat    Return to the emergency department if:    You have chest discomfort that gets worse, even with medicine.    You cough or vomit blood.    Your bowel movements are black or bloody.    You cannot stop vomiting, or it hurts to swallow.  Call your doctor if:    You have questions or concerns about your condition or care.    Medicines:    Medicines may be given to treat the cause of your chest pain. Examples include pain medicine, anxiety medicine, or medicines to increase blood flow to your heart.    Do not take certain medicines without asking your healthcare provider first. These include NSAIDs, herbal or vitamin supplements, and hormones, such as estrogen or progestin.    Take your medicine as directed. Contact your healthcare provider if you think your medicine is not helping or if you have side effects. Tell your provider if you are allergic to any medicine. Keep a list of the medicines, vitamins, and herbs you take. Include the amounts, and when and why you take them. Bring the list or the pill bottles to follow-up visits. Carry your medicine list with you in case of an emergency.  Healthy living tips: If the cause of your chest pain is known, your healthcare provider will give you specific guidelines to follow. The following are general healthy guidelines:    Do not smoke. Nicotine and other chemicals in cigarettes and cigars can cause lung and heart damage. Ask your healthcare provider for information if you currently smoke and need help to quit. E-cigarettes or smokeless tobacco still contain nicotine. Talk to your healthcare provider before you use these products.    Choose a variety of healthy foods as often as possible. Include fresh, frozen, or canned fruits and vegetables. Also include low-fat dairy products, fish, chicken (without skin), and lean meats. Your healthcare provider or a dietitian can help you create meal plans. You may need to avoid certain foods or drinks if your pain is caused by a digestion problem.  Healthy Foods      Lower your sodium (salt) intake. Limit foods that are high in sodium, such as canned foods, salty snacks, and cold cuts. If you add salt when you cook food, do not add more at the table. Choose low-sodium canned foods as much as possible.        Drink plenty of water every day. Water helps your body to control your temperature and blood pressure. Ask your healthcare provider how much water you should drink every day.    Ask about activity. Your healthcare provider will tell you which activities to limit or avoid. Ask when you can drive, return to work, and have sex. Ask about the best exercise plan for you.    Maintain a healthy weight. Ask your healthcare provider what a healthy weight is for you. Ask him or her to help you create a safe weight loss plan if you are overweight.    Ask about vaccines you may need. Your healthcare provider can tell you which vaccines you need, and when to get them. The following vaccines help prevent diseases that can become serious for a person with a heart condition:  The influenza (flu) vaccine is given each year. Get a flu vaccine as soon as recommended, usually in September or October.    The pneumonia vaccine is usually given every 5 years. Your healthcare provider may recommend the pneumonia vaccine if you are 65 or older.    COVID-19 vaccines are given to adults as a shot. At least 1 dose of an updated vaccine is recommended for all adults. COVID-19 vaccines are updated throughout the year. Adults 65 or older need a second dose of updated vaccine at least 4 months after the first dose. Your healthcare provider can help you schedule all needed doses as updated vaccines become available.  Prevent Heart Disease   Follow up with your doctor within 72 hours, or as directed: You may need to return for more tests to find the cause of your chest pain. You may be referred to a specialist, such as a cardiologist or gastroenterologist. Write down your questions so you remember to ask them during your visits.

## 2025-01-11 NOTE — ED ADULT NURSE REASSESSMENT NOTE - NURSING MUSC ROM
Theresa Gomez is an 43 year old female presents to urgent care for left posterior rib pain that began 5 days ago after she slipped off a stretching piece of equipment while at work and landed on her left posterior ribs.  Patient denies shortness or breath, chest pain, difficulty breathing however she does note increased pain with deep breathing and movement.  Pain was improving over the past few days however last night when she went to bed she believes she may have slept awkwardly and then woke up with increased pain.  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  Believes the motion of the bus and sleeping situation may be contributing to her increase in symptoms.  Denies cough, hemoptysis, leg swelling, spinal tenderness.  Denies hitting head or having back pain or neck pain.  Denies any spinal tenderness, no numbness or tingling no changes in bowel or bladder function    History reviewed. No pertinent past medical history.  History reviewed. No pertinent surgical history.  History reviewed. No pertinent family history.  Social History     Tobacco Use    Smoking status: Never    Smokeless tobacco: Never   Substance Use Topics    Alcohol use: Not on file       Prior to Admission Meds:(Not in a hospital admission)    Scheduled Meds:  No current facility-administered medications for this visit.     Continuous Infusions:  No current facility-administered medications for this visit.     PRN Meds:  No current facility-administered medications for this visit.       Allergies:   ALLERGIES:   Allergen Reactions    Cephalexin RASH    Sulfa Antibiotics RASH       Active Problems:    * No active hospital problems. *    Blood pressure 128/87, pulse 80, temperature 98 °F (36.7 °C), temperature source Oral, resp. rate 18, SpO2 100 %.    Review of Systems   Constitutional: Negative.    HENT: Negative.     Eyes: Negative.    Respiratory: Negative.  Negative for chest tightness and shortness of  breath.    Cardiovascular: Negative.  Negative for chest pain.   Gastrointestinal: Negative.    Endocrine: Negative.    Genitourinary: Negative.    Musculoskeletal:  Negative for back pain and neck pain.        Right posterior rib pain/injury   Hematological: Negative.    Psychiatric/Behavioral: Negative.          Physical Exam  Constitutional:       Appearance: Normal appearance.   HENT:      Head: Normocephalic.      Right Ear: Tympanic membrane, ear canal and external ear normal.      Left Ear: Tympanic membrane, ear canal and external ear normal.      Mouth/Throat:      Mouth: Mucous membranes are moist.      Pharynx: Oropharynx is clear.   Eyes:      Pupils: Pupils are equal, round, and reactive to light.   Cardiovascular:      Rate and Rhythm: Normal rate.   Pulmonary:      Effort: Pulmonary effort is normal. No respiratory distress.      Breath sounds: Normal breath sounds. No wheezing or rales.   Abdominal:      General: Abdomen is flat.      Palpations: Abdomen is soft.      Tenderness: There is no abdominal tenderness.   Musculoskeletal:      Cervical back: Neck supple. No rigidity or tenderness.      Thoracic back: Tenderness present.      Comments: No spinal tenderness or cervical tenderness noted.  Right lateral rib tenderness noted with palpation.  No hematoma lacerations or abrasions noted to skin.  No obvious rib deformities noted.    Neurological:      Mental Status: She is alert.         Assessment:  Rib injury  (primary encounter diagnosis)  Plan: XR RIBS 2 VIEWS LEFT CHEST 1 VIEW        Plan:  Right posterior rib injury after sliding off off a stretching machine at work and hitting area of tenderness on another part of the machine.  Has been living/working on a tour bus and feels sleeping on bus has contributed to her symptoms not improving.  No red flag symptoms including did not hit head no chest pain no shortness a breath no numbness or tingling and no spinal tenderness.  Patient concerned  about continuing work, would like x-ray to evaluate for rib fractures.  Rib series obtained without evidence of any acute abnormalities including fractures.  We will prescribe Flexeril to help with muscle pain/spasms.  Patient also encouraged to continue taking Tylenol and ibuprofen as needed for pain management.  Patient given worker's comp form including limitations for work-activity as tolerated without provoking pain.  Patient agreeable with plan of care and denies further questions    SKYE Kumari  2/8/2024   full range of motion in all extremities

## 2025-01-11 NOTE — ED PROVIDER NOTE - PATIENT PORTAL LINK FT
You can access the FollowMyHealth Patient Portal offered by [facility] by registering at the following website: [email or web address]. By joining [facility]’s FollowMyHealth portal, you will also be able to view your health information using other applications (apps) compatible with our system.

## 2025-01-11 NOTE — ED ADULT NURSE REASSESSMENT NOTE - NSFALLUNIVINTERV_ED_ALL_ED
Bed/Stretcher in lowest position, wheels locked, appropriate side rails in place/Call bell, personal items and telephone in reach/Instruct patient to call for assistance before getting out of bed/chair/stretcher/Non-slip footwear applied when patient is off stretcher/[place] to call system/Physically safe environment - no spills, clutter or unnecessary equipment/Purposeful proactive rounding/Room/bathroom lighting operational, light cord in reach

## 2025-01-13 PROBLEM — Z00.00 ENCOUNTER FOR PREVENTIVE HEALTH EXAMINATION: Status: ACTIVE | Noted: 2025-01-13

## 2025-01-14 ENCOUNTER — APPOINTMENT (OUTPATIENT)
Dept: CARDIOLOGY | Facility: CLINIC | Age: 40
End: 2025-01-14
Payer: COMMERCIAL

## 2025-01-14 ENCOUNTER — NON-APPOINTMENT (OUTPATIENT)
Age: 40
End: 2025-01-14

## 2025-01-14 ENCOUNTER — TRANSCRIPTION ENCOUNTER (OUTPATIENT)
Age: 40
End: 2025-01-14

## 2025-01-14 VITALS
HEIGHT: 73 IN | DIASTOLIC BLOOD PRESSURE: 78 MMHG | HEART RATE: 66 BPM | SYSTOLIC BLOOD PRESSURE: 132 MMHG | BODY MASS INDEX: 26.24 KG/M2 | WEIGHT: 198 LBS | OXYGEN SATURATION: 87 %

## 2025-01-14 VITALS — DIASTOLIC BLOOD PRESSURE: 70 MMHG | SYSTOLIC BLOOD PRESSURE: 118 MMHG

## 2025-01-14 DIAGNOSIS — Z82.49 FAMILY HISTORY OF ISCHEMIC HEART DISEASE AND OTHER DISEASES OF THE CIRCULATORY SYSTEM: ICD-10-CM

## 2025-01-14 DIAGNOSIS — I25.10 ATHEROSCLEROTIC HEART DISEASE OF NATIVE CORONARY ARTERY W/OUT ANGINA PECTORIS: ICD-10-CM

## 2025-01-14 DIAGNOSIS — E78.5 HYPERLIPIDEMIA, UNSPECIFIED: ICD-10-CM

## 2025-01-14 DIAGNOSIS — F41.9 ANXIETY DISORDER, UNSPECIFIED: ICD-10-CM

## 2025-01-14 PROCEDURE — G2211 COMPLEX E/M VISIT ADD ON: CPT | Mod: NC

## 2025-01-14 PROCEDURE — 93000 ELECTROCARDIOGRAM COMPLETE: CPT

## 2025-01-14 PROCEDURE — 99215 OFFICE O/P EST HI 40 MIN: CPT

## 2025-01-14 RX ORDER — ESCITALOPRAM OXALATE 5 MG/1
5 TABLET ORAL
Qty: 90 | Refills: 1 | Status: ACTIVE | COMMUNITY
Start: 2025-01-14

## 2025-01-14 RX ORDER — PRAVASTATIN SODIUM 10 MG/1
10 TABLET ORAL
Qty: 30 | Refills: 3 | Status: DISCONTINUED | COMMUNITY
Start: 2025-01-14 | End: 2025-01-14

## 2025-01-14 RX ORDER — ROSUVASTATIN CALCIUM 20 MG/1
20 TABLET, FILM COATED ORAL
Qty: 90 | Refills: 0 | Status: ACTIVE | COMMUNITY
Start: 2025-01-14

## 2025-01-14 RX ORDER — ASPIRIN 81 MG/1
81 TABLET, COATED ORAL
Qty: 90 | Refills: 3 | Status: ACTIVE | COMMUNITY
Start: 2025-01-14

## 2025-01-15 ENCOUNTER — EMERGENCY (EMERGENCY)
Facility: HOSPITAL | Age: 40
LOS: 1 days | Discharge: HOME | End: 2025-01-15
Attending: STUDENT IN AN ORGANIZED HEALTH CARE EDUCATION/TRAINING PROGRAM | Admitting: STUDENT IN AN ORGANIZED HEALTH CARE EDUCATION/TRAINING PROGRAM
Payer: COMMERCIAL

## 2025-01-15 VITALS
HEART RATE: 77 BPM | RESPIRATION RATE: 14 BRPM | TEMPERATURE: 98 F | DIASTOLIC BLOOD PRESSURE: 77 MMHG | HEIGHT: 72 IN | OXYGEN SATURATION: 98 % | SYSTOLIC BLOOD PRESSURE: 136 MMHG | WEIGHT: 205.03 LBS

## 2025-01-15 LAB
ALBUMIN SERPL ELPH-MCNC: 3.8 G/DL — SIGNIFICANT CHANGE UP (ref 3.3–5)
ALP SERPL-CCNC: 47 U/L — SIGNIFICANT CHANGE UP (ref 40–120)
ALT FLD-CCNC: 26 U/L — SIGNIFICANT CHANGE UP (ref 12–78)
ANION GAP SERPL CALC-SCNC: 7 MMOL/L — SIGNIFICANT CHANGE UP (ref 5–17)
APTT BLD: 36.1 SEC — HIGH (ref 24.5–35.6)
AST SERPL-CCNC: 15 U/L — SIGNIFICANT CHANGE UP (ref 15–37)
BASOPHILS # BLD AUTO: 0.02 K/UL — SIGNIFICANT CHANGE UP (ref 0–0.2)
BASOPHILS NFR BLD AUTO: 0.2 % — SIGNIFICANT CHANGE UP (ref 0–2)
BILIRUB SERPL-MCNC: 0.4 MG/DL — SIGNIFICANT CHANGE UP (ref 0.2–1.2)
BUN SERPL-MCNC: 15 MG/DL — SIGNIFICANT CHANGE UP (ref 7–23)
CALCIUM SERPL-MCNC: 9 MG/DL — SIGNIFICANT CHANGE UP (ref 8.5–10.1)
CHLORIDE SERPL-SCNC: 105 MMOL/L — SIGNIFICANT CHANGE UP (ref 96–108)
CO2 SERPL-SCNC: 27 MMOL/L — SIGNIFICANT CHANGE UP (ref 22–31)
CREAT SERPL-MCNC: 0.9 MG/DL — SIGNIFICANT CHANGE UP (ref 0.5–1.3)
EGFR: 110 ML/MIN/1.73M2 — SIGNIFICANT CHANGE UP
EGFR: 110 ML/MIN/1.73M2 — SIGNIFICANT CHANGE UP
EOSINOPHIL # BLD AUTO: 0.06 K/UL — SIGNIFICANT CHANGE UP (ref 0–0.5)
EOSINOPHIL NFR BLD AUTO: 0.6 % — SIGNIFICANT CHANGE UP (ref 0–6)
GLUCOSE SERPL-MCNC: 112 MG/DL — HIGH (ref 70–99)
HCT VFR BLD CALC: 38.1 % — LOW (ref 39–50)
HGB BLD-MCNC: 13 G/DL — SIGNIFICANT CHANGE UP (ref 13–17)
IMM GRANULOCYTES NFR BLD AUTO: 0.2 % — SIGNIFICANT CHANGE UP (ref 0–0.9)
INR BLD: 1.17 RATIO — HIGH (ref 0.85–1.16)
LIDOCAIN IGE QN: 37 U/L — SIGNIFICANT CHANGE UP (ref 13–75)
LYMPHOCYTES # BLD AUTO: 1.66 K/UL — SIGNIFICANT CHANGE UP (ref 1–3.3)
LYMPHOCYTES # BLD AUTO: 16.9 % — SIGNIFICANT CHANGE UP (ref 13–44)
MCHC RBC-ENTMCNC: 31.3 PG — SIGNIFICANT CHANGE UP (ref 27–34)
MCHC RBC-ENTMCNC: 34.1 G/DL — SIGNIFICANT CHANGE UP (ref 32–36)
MCV RBC AUTO: 91.6 FL — SIGNIFICANT CHANGE UP (ref 80–100)
MONOCYTES # BLD AUTO: 0.45 K/UL — SIGNIFICANT CHANGE UP (ref 0–0.9)
MONOCYTES NFR BLD AUTO: 4.6 % — SIGNIFICANT CHANGE UP (ref 2–14)
NEUTROPHILS # BLD AUTO: 7.6 K/UL — HIGH (ref 1.8–7.4)
NEUTROPHILS NFR BLD AUTO: 77.5 % — HIGH (ref 43–77)
NRBC # BLD: 0 /100 WBCS — SIGNIFICANT CHANGE UP (ref 0–0)
NRBC BLD-RTO: 0 /100 WBCS — SIGNIFICANT CHANGE UP (ref 0–0)
NT-PROBNP SERPL-SCNC: 11 PG/ML — SIGNIFICANT CHANGE UP (ref 0–125)
PLATELET # BLD AUTO: 344 K/UL — SIGNIFICANT CHANGE UP (ref 150–400)
POTASSIUM SERPL-MCNC: 4.6 MMOL/L — SIGNIFICANT CHANGE UP (ref 3.5–5.3)
POTASSIUM SERPL-SCNC: 4.6 MMOL/L — SIGNIFICANT CHANGE UP (ref 3.5–5.3)
PROT SERPL-MCNC: 6.9 G/DL — SIGNIFICANT CHANGE UP (ref 6–8.3)
PROTHROM AB SERPL-ACNC: 13.7 SEC — HIGH (ref 9.9–13.4)
RBC # BLD: 4.16 M/UL — LOW (ref 4.2–5.8)
RBC # FLD: 12.1 % — SIGNIFICANT CHANGE UP (ref 10.3–14.5)
SODIUM SERPL-SCNC: 139 MMOL/L — SIGNIFICANT CHANGE UP (ref 135–145)
TROPONIN I, HIGH SENSITIVITY RESULT: 6.1 NG/L — SIGNIFICANT CHANGE UP
TROPONIN I, HIGH SENSITIVITY RESULT: 6.9 NG/L — SIGNIFICANT CHANGE UP
WBC # BLD: 9.81 K/UL — SIGNIFICANT CHANGE UP (ref 3.8–10.5)
WBC # FLD AUTO: 9.81 K/UL — SIGNIFICANT CHANGE UP (ref 3.8–10.5)

## 2025-01-15 PROCEDURE — 85025 COMPLETE CBC W/AUTO DIFF WBC: CPT

## 2025-01-15 PROCEDURE — 83690 ASSAY OF LIPASE: CPT

## 2025-01-15 PROCEDURE — 93005 ELECTROCARDIOGRAM TRACING: CPT

## 2025-01-15 PROCEDURE — 36415 COLL VENOUS BLD VENIPUNCTURE: CPT

## 2025-01-15 PROCEDURE — 84484 ASSAY OF TROPONIN QUANT: CPT

## 2025-01-15 PROCEDURE — 83880 ASSAY OF NATRIURETIC PEPTIDE: CPT

## 2025-01-15 PROCEDURE — 93010 ELECTROCARDIOGRAM REPORT: CPT

## 2025-01-15 PROCEDURE — 85610 PROTHROMBIN TIME: CPT

## 2025-01-15 PROCEDURE — 71046 X-RAY EXAM CHEST 2 VIEWS: CPT

## 2025-01-15 PROCEDURE — 71046 X-RAY EXAM CHEST 2 VIEWS: CPT | Mod: 26

## 2025-01-15 PROCEDURE — 80053 COMPREHEN METABOLIC PANEL: CPT

## 2025-01-15 PROCEDURE — 85730 THROMBOPLASTIN TIME PARTIAL: CPT

## 2025-01-15 PROCEDURE — 99285 EMERGENCY DEPT VISIT HI MDM: CPT | Mod: 25

## 2025-01-15 PROCEDURE — 99285 EMERGENCY DEPT VISIT HI MDM: CPT

## 2025-01-15 RX ADMIN — Medication 1000 MILLILITER(S): at 22:12

## 2025-01-15 RX ADMIN — Medication 1000 MILLILITER(S): at 22:02

## 2025-01-16 VITALS
TEMPERATURE: 98 F | SYSTOLIC BLOOD PRESSURE: 125 MMHG | DIASTOLIC BLOOD PRESSURE: 78 MMHG | HEART RATE: 77 BPM | RESPIRATION RATE: 16 BRPM | OXYGEN SATURATION: 99 %

## 2025-01-20 ENCOUNTER — EMERGENCY (EMERGENCY)
Facility: HOSPITAL | Age: 40
LOS: 1 days | Discharge: HOME | End: 2025-01-20
Attending: STUDENT IN AN ORGANIZED HEALTH CARE EDUCATION/TRAINING PROGRAM | Admitting: STUDENT IN AN ORGANIZED HEALTH CARE EDUCATION/TRAINING PROGRAM
Payer: COMMERCIAL

## 2025-01-20 VITALS
DIASTOLIC BLOOD PRESSURE: 83 MMHG | HEART RATE: 90 BPM | SYSTOLIC BLOOD PRESSURE: 136 MMHG | OXYGEN SATURATION: 96 % | RESPIRATION RATE: 15 BRPM | TEMPERATURE: 98 F | HEIGHT: 72 IN | WEIGHT: 199.96 LBS

## 2025-01-20 VITALS
HEART RATE: 61 BPM | OXYGEN SATURATION: 94 % | SYSTOLIC BLOOD PRESSURE: 113 MMHG | DIASTOLIC BLOOD PRESSURE: 76 MMHG | TEMPERATURE: 98 F | RESPIRATION RATE: 18 BRPM

## 2025-01-20 LAB
ALBUMIN SERPL ELPH-MCNC: 4.2 G/DL — SIGNIFICANT CHANGE UP (ref 3.3–5)
ALP SERPL-CCNC: 55 U/L — SIGNIFICANT CHANGE UP (ref 30–120)
ALT FLD-CCNC: 30 U/L — SIGNIFICANT CHANGE UP (ref 10–60)
ANION GAP SERPL CALC-SCNC: 9 MMOL/L — SIGNIFICANT CHANGE UP (ref 5–17)
AST SERPL-CCNC: 21 U/L — SIGNIFICANT CHANGE UP (ref 10–40)
BASOPHILS # BLD AUTO: 0.03 K/UL — SIGNIFICANT CHANGE UP (ref 0–0.2)
BASOPHILS NFR BLD AUTO: 0.3 % — SIGNIFICANT CHANGE UP (ref 0–2)
BILIRUB SERPL-MCNC: 0.4 MG/DL — SIGNIFICANT CHANGE UP (ref 0.2–1.2)
BUN SERPL-MCNC: 11 MG/DL — SIGNIFICANT CHANGE UP (ref 7–23)
CALCIUM SERPL-MCNC: 9.6 MG/DL — SIGNIFICANT CHANGE UP (ref 8.4–10.5)
CHLORIDE SERPL-SCNC: 103 MMOL/L — SIGNIFICANT CHANGE UP (ref 96–108)
CO2 SERPL-SCNC: 28 MMOL/L — SIGNIFICANT CHANGE UP (ref 22–31)
CREAT SERPL-MCNC: 1.02 MG/DL — SIGNIFICANT CHANGE UP (ref 0.5–1.3)
EGFR: 95 ML/MIN/1.73M2 — SIGNIFICANT CHANGE UP
EOSINOPHIL # BLD AUTO: 0.1 K/UL — SIGNIFICANT CHANGE UP (ref 0–0.5)
EOSINOPHIL NFR BLD AUTO: 1 % — SIGNIFICANT CHANGE UP (ref 0–6)
GLUCOSE SERPL-MCNC: 119 MG/DL — HIGH (ref 70–99)
HCT VFR BLD CALC: 42.1 % — SIGNIFICANT CHANGE UP (ref 39–50)
HGB BLD-MCNC: 14.3 G/DL — SIGNIFICANT CHANGE UP (ref 13–17)
IMM GRANULOCYTES NFR BLD AUTO: 0.2 % — SIGNIFICANT CHANGE UP (ref 0–0.9)
LYMPHOCYTES # BLD AUTO: 2.53 K/UL — SIGNIFICANT CHANGE UP (ref 1–3.3)
LYMPHOCYTES # BLD AUTO: 25.7 % — SIGNIFICANT CHANGE UP (ref 13–44)
MAGNESIUM SERPL-MCNC: 2.2 MG/DL — SIGNIFICANT CHANGE UP (ref 1.6–2.6)
MCHC RBC-ENTMCNC: 31 PG — SIGNIFICANT CHANGE UP (ref 27–34)
MCHC RBC-ENTMCNC: 34 G/DL — SIGNIFICANT CHANGE UP (ref 32–36)
MCV RBC AUTO: 91.1 FL — SIGNIFICANT CHANGE UP (ref 80–100)
MONOCYTES # BLD AUTO: 0.58 K/UL — SIGNIFICANT CHANGE UP (ref 0–0.9)
MONOCYTES NFR BLD AUTO: 5.9 % — SIGNIFICANT CHANGE UP (ref 2–14)
NEUTROPHILS # BLD AUTO: 6.58 K/UL — SIGNIFICANT CHANGE UP (ref 1.8–7.4)
NEUTROPHILS NFR BLD AUTO: 66.9 % — SIGNIFICANT CHANGE UP (ref 43–77)
NRBC # BLD: 0 /100 WBCS — SIGNIFICANT CHANGE UP (ref 0–0)
NT-PROBNP SERPL-SCNC: 12 PG/ML — SIGNIFICANT CHANGE UP (ref 0–125)
PLATELET # BLD AUTO: 397 K/UL — SIGNIFICANT CHANGE UP (ref 150–400)
POTASSIUM SERPL-MCNC: 4.3 MMOL/L — SIGNIFICANT CHANGE UP (ref 3.5–5.3)
POTASSIUM SERPL-SCNC: 4.3 MMOL/L — SIGNIFICANT CHANGE UP (ref 3.5–5.3)
PROT SERPL-MCNC: 7.6 G/DL — SIGNIFICANT CHANGE UP (ref 6–8.3)
RBC # BLD: 4.62 M/UL — SIGNIFICANT CHANGE UP (ref 4.2–5.8)
RBC # FLD: 12 % — SIGNIFICANT CHANGE UP (ref 10.3–14.5)
SODIUM SERPL-SCNC: 140 MMOL/L — SIGNIFICANT CHANGE UP (ref 135–145)
TROPONIN I, HIGH SENSITIVITY RESULT: 4.8 NG/L — SIGNIFICANT CHANGE UP
WBC # BLD: 9.84 K/UL — SIGNIFICANT CHANGE UP (ref 3.8–10.5)
WBC # FLD AUTO: 9.84 K/UL — SIGNIFICANT CHANGE UP (ref 3.8–10.5)

## 2025-01-20 PROCEDURE — 85025 COMPLETE CBC W/AUTO DIFF WBC: CPT

## 2025-01-20 PROCEDURE — 93010 ELECTROCARDIOGRAM REPORT: CPT

## 2025-01-20 PROCEDURE — 96374 THER/PROPH/DIAG INJ IV PUSH: CPT

## 2025-01-20 PROCEDURE — 99285 EMERGENCY DEPT VISIT HI MDM: CPT | Mod: 25

## 2025-01-20 PROCEDURE — 93005 ELECTROCARDIOGRAM TRACING: CPT

## 2025-01-20 PROCEDURE — 83735 ASSAY OF MAGNESIUM: CPT

## 2025-01-20 PROCEDURE — 99285 EMERGENCY DEPT VISIT HI MDM: CPT

## 2025-01-20 PROCEDURE — 36415 COLL VENOUS BLD VENIPUNCTURE: CPT

## 2025-01-20 PROCEDURE — 83880 ASSAY OF NATRIURETIC PEPTIDE: CPT

## 2025-01-20 PROCEDURE — 80053 COMPREHEN METABOLIC PANEL: CPT

## 2025-01-20 PROCEDURE — 71045 X-RAY EXAM CHEST 1 VIEW: CPT

## 2025-01-20 PROCEDURE — 84484 ASSAY OF TROPONIN QUANT: CPT

## 2025-01-20 PROCEDURE — 71045 X-RAY EXAM CHEST 1 VIEW: CPT | Mod: 26

## 2025-01-20 RX ORDER — SODIUM CHLORIDE 9 MG/ML
1000 INJECTION, SOLUTION INTRAMUSCULAR; INTRAVENOUS; SUBCUTANEOUS ONCE
Refills: 0 | Status: COMPLETED | OUTPATIENT
Start: 2025-01-20 | End: 2025-01-20

## 2025-01-20 RX ORDER — ACETAMINOPHEN 80 MG/.8ML
1000 SOLUTION/ DROPS ORAL ONCE
Refills: 0 | Status: COMPLETED | OUTPATIENT
Start: 2025-01-20 | End: 2025-01-20

## 2025-01-20 RX ORDER — ALPRAZOLAM 0.25 MG/1
0.5 TABLET ORAL ONCE
Refills: 0 | Status: DISCONTINUED | OUTPATIENT
Start: 2025-01-20 | End: 2025-01-20

## 2025-01-20 RX ADMIN — SODIUM CHLORIDE 1000 MILLILITER(S): 9 INJECTION, SOLUTION INTRAMUSCULAR; INTRAVENOUS; SUBCUTANEOUS at 19:41

## 2025-01-20 RX ADMIN — ACETAMINOPHEN 400 MILLIGRAM(S): 80 SOLUTION/ DROPS ORAL at 19:41

## 2025-01-20 RX ADMIN — ALPRAZOLAM 0.5 MILLIGRAM(S): 0.25 TABLET ORAL at 19:41

## 2025-01-20 NOTE — ED PROVIDER NOTE - CARE PROVIDER_API CALL
Go Carl  Cardiology  [address] [place] [address]  Phone: [phone]  Fax: [phone]  Follow Up Time:

## 2025-01-20 NOTE — ED ADULT NURSE NOTE - PRIMARY CARE PROVIDER
HISTORY OF PRESENT ILLNESS  Umberto Brunner is a 78 y.o. female     SUMMARY:   Problem List  Date Reviewed: 1/10/2019          Codes Class Noted    Type 2 diabetes with nephropathy ([facility] ICD-10-CM: E11.21  ICD-9-CM: 250.40, 583.81  8/20/2018        Acute respiratory failure ([facility] ICD-10-CM: J96.00  ICD-9-CM: 518.81  6/21/2018        Elevated troponin ICD-10-CM: R74.8  ICD-9-CM: 790.6  6/21/2018        CAD (coronary artery disease) ICD-10-CM: I25.10  ICD-9-CM: 414.00  6/21/2018        Acute renal failure superimposed on stage 3 chronic kidney disease ([facility] ICD-10-CM: N17.9, N18.3  ICD-9-CM: 584.9, 585.3  6/21/2018        Gout flare ICD-10-CM: M10.9  ICD-9-CM: 274.01  6/21/2018        Hyperglycemia due to type 2 diabetes mellitus ([facility] ICD-10-CM: E11.65  ICD-9-CM: 250.00  6/21/2018        Metastatic breast cancer ([place] [address].) ICD-10-CM: C50.919  ICD-9-CM: 174.9  6/1/2018        Goals of care, counseling/discussion ICD-10-CM: Z71.89  ICD-9-CM: V65.49  6/1/2018        Acute on chronic systolic HF (heart failure) ([facility] ICD-10-CM: I50.23  ICD-9-CM: 428.23  5/19/2018        Acute systolic heart failure ([facility] ICD-10-CM: I50.21  ICD-9-CM: 428.21  4/24/2018        Altered mental status, unspecified ICD-10-CM: R41.82  ICD-9-CM: 780.97  4/23/2018        CKD (chronic kidney disease) stage 3, GFR 30-59 ml/min ([facility]) ICD-10-CM: N18.3  ICD-9-CM: 585.3  10/25/2017        Vitamin D deficiency ICD-10-CM: E55.9  ICD-9-CM: 268.9  6/16/2017        Asymptomatic hyperuricemia ICD-10-CM: E79.0  ICD-9-CM: 790.6  2/16/2017        Statin intolerance ICD-10-CM: Z78.9  ICD-9-CM: 995.27  12/21/2016        Long-term use of immunosuppressant medication ICD-10-CM: Z79.899  ICD-9-CM: V58.69  11/21/2016        Seropositive rheumatoid arthritis of multiple sites [facility]) ICD-10-CM: M05.79  ICD-9-CM: 714.0  9/28/2016        Primary osteoarthritis of both knees ICD-10-CM: M17.0  ICD-9-CM: 715.16  9/28/2016        Occlusion and stenosis of carotid artery without mention of cerebral infarction ICD-10-CM: I65.29  ICD-9-CM: 433.10  8/23/2013        Weakness due to cerebrovascular accident ICD-10-CM: YUE0121  ICD-9-CM: Ion Snide  4/2/2012        Neuropathy in diabetes [facility]) ICD-10-CM: E11.40  ICD-9-CM: 250.60, 357.2  4/2/2012        PAD (peripheral artery disease) ([facility] ICD-10-CM: I73.9  ICD-9-CM: 443.9  9/7/2011        Carotid bruit ICD-10-CM: R09.89  ICD-9-CM: 785.9  8/31/2011        Claudication ([facility] ICD-10-CM: I73.9  ICD-9-CM: 443.9  8/31/2011        Weakness of left arm ICD-10-CM: R29.898  ICD-9-CM: 729.89  8/31/2011        Hyperlipidemia ICD-10-CM: E78.5  ICD-9-CM: 272.4  1/27/2010        DM (diabetes mellitus) ([facility] ICD-10-CM: E11.9  ICD-9-CM: 250.00  9/2/2009        Hypothyroid ICD-10-CM: E03.9  ICD-9-CM: 244.9  9/2/2009        Colon cancer ([facility] ICD-10-CM: C18.9  ICD-9-CM: 153.9  9/2/2009        H/O: CVA ICD-9-CM: V12.54  9/2/2009        Microalbuminuria ICD-10-CM: R80.9  ICD-9-CM: 791.0  9/2/2009        Age-related osteoporosis without current pathological fracture ICD-10-CM: M81.0  ICD-9-CM: 733.01  9/2/2009        Essential hypertension ICD-10-CM: I10  ICD-9-CM: 401.9  9/2/2009        Anemia ICD-10-CM: D64.9  ICD-9-CM: 285.9  9/2/2009              Current Outpatient Medications on File Prior to Visit   Medication Sig    FORTEO 20 mcg/dose - 600 mcg/2.4 mL pnij injection INJECT 20 MCG UNDER THE SKIN EVERY DAY    clopidogrel (PLAVIX) 75 mg tab Take 1 Tab by mouth daily.  furosemide (LASIX) 40 mg tablet Take 2 Tabs by mouth daily. (Patient taking differently: Take 40 mg by mouth daily.)    carvedilol (COREG) 6.25 mg tablet Take 1 Tab by mouth two (2) times daily (with meals).  insulin lispro (HUMALOG U-100 INSULIN) 100 unit/mL injection by SubCUTAneous route. Sliding scale    magnesium hydroxide ([employer] MILK OF MAGNESIA) 400 mg/5 mL suspension Take 30 mL by mouth daily as needed for Constipation.     palbociclib (IBRANCE) 125 mg cap Take  by mouth daily. Indications: 21 days on medication, 7 days off medication    evolocumab (REPATHA SURECLICK) pen injection by SubCUTAneous route.  acetaminophen (TYLENOL) 325 mg tablet Take 2 Tabs by mouth every four (4) hours as needed.  b-complex with vitamin c tablet Take 1 Tab by mouth daily.  methyl salicylate-menthol (BENGAY) 15-10 % topical cream Apply  to affected area as needed for Pain. APPLY TO right shoulder    Nursing, document site in comments    nitroglycerin (NITROSTAT) 0.4 mg SL tablet 1 Tab by SubLINGual route as needed for Chest Pain. Up to 3 doses.  anastrozole (ARIMIDEX) 1 mg tablet Take 1 Tab by mouth daily.  polyethylene glycol (MIRALAX) 17 gram packet Take 1 Packet by mouth daily.  levothyroxine (SYNTHROID) 88 mcg tablet Take 88 mcg by mouth Daily (before breakfast).  folic acid (FOLVITE) 1 mg tablet TAKE ONE TABLET BY MOUTH DAILY    aspirin delayed-release 81 mg tablet Take 81 mg by mouth daily.  OMEGA-3 FATTY ACIDS/FISH OIL (OMEGA 3 FISH OIL PO) Take 300 mg by mouth daily.  CHOLECALCIFEROL, VITAMIN D3, (VITAMIN D-3 PO) Take 1,000 Units by mouth daily.  simethicone (MYLICON) 80 mg chewable tablet Take 1 Tab by mouth as needed. Indications: FLATULENCE     No current facility-administered medications on file prior to visit. CARDIOLOGY STUDIES TO DATE:  6/18 echo lvef 30%, trivial pericardial effusion    Chief Complaint   Patient presents with    Cardiomyopathy    Coronary Artery Disease     HPI :  Ms. Javier Lozada is doing okay. Apparently, she recently saw Dr. Caity Larry and her chemotherapy is on hold because her white count was still a little down and the son tells me that her creatinine was up to about 1.8. Her weight is steady. She is not having any edema or significant shortness of breath.   She is having some daytime fatigue, but it turns out she cannot make it through the night wearing her CPAP more than a couple of hours because she wakes up and it comes off and then she cannot get it back on by herself. CARDIAC ROS:   negative for chest pain, dyspnea, palpitations, syncope, orthopnea, paroxysmal nocturnal dyspnea, exertional chest pressure/discomfort, claudication, lower extremity edema    Family History   Problem Relation Age of Onset    Diabetes Mother     Stroke Mother     Diabetes Sister     Other Sister         fell and hit her head -  of this   Wendie Eryn Diabetes Brother     Cancer Father         stomach    Diabetes Sister        Past Medical History:   Diagnosis Date    Anemia 2009    Colon cancer ([facility] 2009    surgery/chemo    Colon cancer ([facility] 2009    DM (diabetes mellitus) ([facility] 2009    GERD (gastroesophageal reflux disease)     H/O: CVA 2009    slight l sided weakness    Hypothyroid 2009    Ill-defined condition     seasonal allergies    Microalbuminuria 2009    Osteoporosis 2009    Other and unspecified hyperlipidemia 2010    Rheumatoid arthritis involving ankle ([facility] 2016    Rheumatoid arthritis(714.0) 2009    Unspecified essential hypertension 2009    Weakness due to cerebrovascular accident        GENERAL ROS:  A comprehensive review of systems was negative except for that written in the HPI.     Visit Vitals  /60   Pulse 68   Ht 5' 5\" (1.651 m)   Wt 121 lb (54.9 kg)   BMI 20.14 kg/m²       Wt Readings from Last 3 Encounters:   19 121 lb (54.9 kg)   10/19/18 120 lb (54.4 kg)   18 120 lb 3.2 oz (54.5 kg)            BP Readings from Last 3 Encounters:   19 132/60   10/19/18 110/60   18 118/50       PHYSICAL EXAM  General appearance: alert, cooperative, no distress, appears stated age  Neurologic: Alert and oriented X 3  Neck: supple, symmetrical, trachea midline, no adenopathy, no carotid bruit and no JVD  Lungs: clear to auscultation bilaterally  Heart: irregularly irregular rhythm, S1, S2 normal, systolic murmur: early systolic 2/6, crescendo at 2nd right intercostal space  Extremities: extremities normal, atraumatic, no cyanosis or edema    Lab Results   Component Value Date/Time    Cholesterol, total 74 04/16/2018 04:21 AM    Cholesterol, total 206 (H) 02/07/2017 09:07 AM    Cholesterol, total 135 11/23/2012 09:09 AM    Cholesterol, total 101 11/30/2011 08:30 AM    Cholesterol, total 147 07/25/2011 08:44 AM    HDL Cholesterol 25 04/16/2018 04:21 AM    HDL Cholesterol 36 (L) 02/07/2017 09:07 AM    HDL Cholesterol 42 11/23/2012 09:09 AM    HDL Cholesterol 31 (L) 11/30/2011 08:30 AM    HDL Cholesterol 45 07/25/2011 08:44 AM    LDL, calculated 31.6 04/16/2018 04:21 AM    LDL, calculated 128 (H) 02/07/2017 09:07 AM    LDL, calculated 74 11/23/2012 09:09 AM    LDL, calculated 55 11/30/2011 08:30 AM    LDL, calculated 83 07/25/2011 08:44 AM    Triglyceride 87 04/16/2018 04:21 AM    Triglyceride 209 (H) 02/07/2017 09:07 AM    Triglyceride 95 11/23/2012 09:09 AM    Triglyceride 74 11/30/2011 08:30 AM    Triglyceride 93 07/25/2011 08:44 AM    CHOL/HDL Ratio 3.0 04/16/2018 04:21 AM    CHOL/HDL Ratio 3.2 06/17/2010 09:33 AM    CHOL/HDL Ratio 2.8 02/12/2009 08:19 AM     ASSESSMENT  Ms. Anna Huertas is stable and well compensated at this point on a reasonable medical regimen and needs no cardiac testing. I told her son that they might want to consider dropping her Lasix to every other day as long as they continue to weigh her daily and if her weight goes up three pounds or more in three days, put her back on it until her weight comes back down. We will try to track down her most recent blood work. current treatment plan is effective, no change in therapy  lab results and schedule of future lab studies reviewed with patient  reviewed diet, exercise and weight control    Encounter Diagnoses   Name Primary?     Ischemic cardiomyopathy Yes    Coronary artery disease involving native coronary artery of native heart without angina pectoris     Acute on chronic systolic HF (heart failure) ([facility]     Essential hypertension     Mixed hyperlipidemia      No orders of the defined types were placed in this encounter. Follow-up Disposition:  Return in about 4 months (around 5/11/2019).     Sharon Saenz MD  1/11/2019 PCP

## 2025-01-20 NOTE — ED PROVIDER NOTE - PATIENT PORTAL LINK FT
You can access the FollowMyHealth Patient Portal offered by [facility] by registering at the following website: [email or web address]. By joining [employer]’s FollowMyHealth portal, you will also be able to view your health information using other applications (apps) compatible with our system.

## 2025-01-20 NOTE — ED PROVIDER NOTE - OBJECTIVE STATEMENT
39M with PMh of HLD, anxiety, who presents with chest pain. patient has presented with similar symptoms many times in the past and has been seen by cardiology many times recently, he was just seen by cardiologist 3 days ago. patient has long history of anxiety and having similar symptoms, he had a CT coronary in an attempt to relieve his anxiety 6 weeks ago and the sutdy showed ~50% LAD occlusion, which would not cause any significant blockage or ACS however it only added to the patient's anxiety. 39M with PMh of HLD, anxiety, who presents with chest pain. patient has presented with similar symptoms many times in the past and has been seen by cardiology many times recently, he was just seen by cardiologist 3 days ago. patient has long history of anxiety and having similar symptoms, he had a CT coronary in an attempt to relieve his anxiety 6 weeks ago and the study showed ~50% LAD occlusion, which would not cause any significant blockage or ACS however it only added to the patient's anxiety.

## 2025-01-20 NOTE — ED ADULT TRIAGE NOTE - CHIEF COMPLAINT QUOTE
I have chest pain since this morning and hx of narrowing LAD, took extra 81mg x 3 before I came to ed, no change

## 2025-01-20 NOTE — ED PROVIDER NOTE - CLINICAL SUMMARY MEDICAL DECISION MAKING FREE TEXT BOX
39M with PMh of HLD, anxiety, who presents with chest pain. patient has presented with similar symptoms many times in the past and has been seen by cardiology many times recently, he was just seen by cardiologist 3 days ago. patient has long history of anxiety and having similar symptoms, he had a CT coronary in an attempt to relieve his anxiety 6 weeks ago and the sutdy showed ~50% LAD occlusion, which would not cause any significant blockage or ACS however it only added to the patient's anxiety.    patient appears very anxious here, will obtain 1 set of troponin, his ECGis non ischemic, will likely discharge with outpatient follow up 39M with PMh of HLD, anxiety, who presents with chest pain. patient has presented with similar symptoms many times in the past and has been seen by cardiology many times recently, he was just seen by cardiologist 3 days ago. patient has long history of anxiety and having similar symptoms, he had a CT coronary in an attempt to relieve his anxiety 6 weeks ago and the sutdy showed ~50% LAD occlusion, which would not cause any significant blockage or ACS however it only added to the patient's anxiety.    patient appears very anxious here, will obtain 1 set of troponin, his ECG is non ischemic, will likely discharge with outpatient follow up    Patient was re-evaluated at this time and they are feeling much better. he has no acute distress. his lab work shows negative troponin here and his symptoms started this morning well over 6 hours ago, no need for serial troponin. patient was just seen by cardiology team 3 days ago and cleared for outpatient discharge. he just had CT coronary showing only mild/moderate disease and no intervention needed, he is taking statin already. I reassured the patient that he simply needs to follow up with cardiology and trust their recommendations. The Patient will be discharged home at this time. Their lab and/or imaging results were explained with the patient and they were instructed to go over them with their PCP. All questions were answered at this time.     Patient was told to follow up with their PCP within the next 2 days. they were told to return to the ED if they have worsening pain, shortness of breath    patient will be discharged home at this time, they are in agreement with the plan

## 2025-01-20 NOTE — ED PROVIDER NOTE - DISCHARGE DATE
Continue using the triamcinolone cream as needed for the itching.  It does not appear infected at this time.    Please follow up with your primary care provider for ongoing symptoms.    You may be receiving a patient experience survey by mail or e-mail from the Urgent Care Staff regarding your visit today.  We value your feedback and hope you can provide us your insight.  
20-Jan-2025

## 2025-01-20 NOTE — ED PROVIDER NOTE - NSFOLLOWUPINSTRUCTIONS_ED_ALL_ED_FT
Please return to the Emergency Department immediately for worsening chest pain, shortness of breath, and if you have any other problems or concerns. Please follow up with your Primary Care Physician within the next 2 days.    Please take any prescription medications prescribed as instructed    Please follow up with cardiology within the next 2 days as well        CHEST PAIN - Ambulatory Care    Chest Pain    AMBULATORY CARE:    Chest pain can be caused by a range of conditions, from not serious to life-threatening. It is important to follow up with your healthcare provider to find the cause of your chest pain.    Common symptoms you may have with chest pain:    Fever or sweating    Nausea or vomiting    Shortness of breath    Discomfort or pressure that spreads from your chest to your back, jaw, or arm    A racing or slow heartbeat    Feeling weak, tired, or faint  Call your local emergency number (911 in the [place]) or have someone call if:    You have any of the following signs of a heart attack:  Squeezing, pressure, or pain in your chest    You may also have any of the following:  Discomfort or pain in your back, neck, jaw, stomach, or arm    Shortness of breath    Nausea or vomiting    Lightheadedness or a sudden cold sweat    Seek care immediately if:    You have chest discomfort that gets worse, even with medicine.    You cough or vomit blood.    Your bowel movements are black or bloody.    You cannot stop vomiting, or it hurts to swallow.  Call your doctor if:    You have questions or concerns about your condition or care.    Treatment for chest pain may include medicine to treat your symptoms while your healthcare provider finds the cause of your chest pain.    Medicines may be given to treat the cause of your chest pain. Examples include pain medicine, anxiety medicine, or medicines to increase blood flow to your heart.    Do not take certain medicines without asking your healthcare provider first. These include NSAIDs, herbal or vitamin supplements, and hormones, such as estrogen or progestin.    One or more stents may need to be placed in your heart if pain was caused by blockage. A stent is a wire mesh tube that helps hold your artery open.  Healthy living tips: If the cause of your chest pain is known, your healthcare provider will give you specific guidelines to follow. The following are general healthy guidelines:    Do not smoke. Nicotine and other chemicals in cigarettes and cigars can cause lung and heart damage. Ask your healthcare provider for information if you currently smoke and need help to quit. E-cigarettes or smokeless tobacco still contain nicotine. Talk to your healthcare provider before you use these products.    Choose a variety of healthy foods as often as possible. Include fresh, frozen, or canned fruits and vegetables. Also include low-fat dairy products, fish, chicken (without skin), and lean meats. Your healthcare provider or a dietitian can help you create meal plans. You may need to avoid certain foods or drinks if your pain is caused by a digestion problem.  Healthy Foods      Lower your sodium (salt) intake. Limit foods that are high in sodium, such as canned foods, salty snacks, and cold cuts. If you add salt when you cook food, do not add more at the table. Choose low-sodium canned foods as much as possible.        Drink plenty of water every day. Water helps your body to control your temperature and blood pressure. Ask your healthcare provider how much water you should drink every day.    Ask about activity. Your healthcare provider will tell you which activities to limit or avoid. Ask when you can drive, return to work, and have sex. Ask about the best exercise plan for you.    Maintain a healthy weight. Ask your healthcare provider what a healthy weight is for you. Ask him or her to help you create a safe weight loss plan if you are overweight.    Ask about vaccines you may need. Your healthcare provider can tell you which vaccines you need, and when to get them. The following vaccines help prevent diseases that can become serious for a person with a heart condition:  The influenza (flu) vaccine is given each year. Get a flu vaccine as soon as recommended, usually in September or October.    The pneumonia vaccine is usually given every 5 years. Your healthcare provider may recommend the pneumonia vaccine if you are 65 or older.    COVID-19 vaccines are given to adults as a shot. At least 1 dose of an updated vaccine is recommended for all adults. COVID-19 vaccines are updated throughout the year. Adults 65 or older need a second dose of updated vaccine at least 4 months after the first dose. Your healthcare provider can help you schedule all needed doses as updated vaccines become available.  Prevent Heart Disease   Follow up with your doctor within 72 hours, or as directed: You may need to return for more tests to find the cause of your chest pain. You may be referred to a specialist, such as a cardiologist or gastroenterologist. Write down your questions so you remember to ask them during your visits.

## 2025-01-20 NOTE — ED PROVIDER NOTE - PHYSICAL EXAMINATION
Gen: Awake, Alert, NAD, anxious but well appearing  Head:  NC/AT  Eyes:  PERRL, EOMI, Conjunctiva pink, no scleral icterus  ENT:  no exudates, no erythema, uvula midline, TMs clear bilaterally, moist mucus membranes  Neck: supple, nontender, no meningismus, no JVD, trachea midline  Cardiac/CV:  S1 S2, RRR, no murmurs  Respiratory/Pulm:  CTAB, good air movement, normal resp effort, no wheezes/stridor/retractions/rales/rhonchi  Gastrointestinal/Abdomen:  Soft, nontender, nondistended, no rebound/guarding  Ext:  warm, well perfused, moving all extremities spontaneously, no cyanosis, no erythema, no edema, distal pulses intact  Skin: intact, no rash, no petechiae, no ecchymosis  Neuro:  AAOx3, sensation intact, motor 5/5 x 4 extremities, clear speech

## 2025-01-20 NOTE — ED PROVIDER NOTE - NSCAREINITIATED _GEN_ER
Problem: Respiratory - Adult  Goal: Achieves optimal ventilation and oxygenation  12/29/2022 1045 by Haris Hamlin RCP  Outcome: Progressing  Goal: Clear lung sounds  12/29/2022 1045 by Haris Hamlin RCP  Outcome: Progressing   Titrating oxygen as pt tolerates  Pt needs encouragement for proper acapella use  Patient mutually agreed on goals. Levar Bowman(Attending)

## 2025-01-20 NOTE — ED ADULT NURSE NOTE - OBJECTIVE STATEMENT
patient A&Ox4 c/o left sided chest pain that began this afternoon while he was relaxing at home with family. denies strenuous activity. patient states originally radiated to jaw but no longer. denies palpitations, SOB, difficulty breathing. patient with multiple ED visits for same recently.

## 2025-01-21 ENCOUNTER — APPOINTMENT (OUTPATIENT)
Age: 40
End: 2025-01-21

## 2025-01-27 ENCOUNTER — APPOINTMENT (OUTPATIENT)
Dept: INTERNAL MEDICINE | Facility: CLINIC | Age: 40
End: 2025-01-27

## 2025-01-27 ENCOUNTER — NON-APPOINTMENT (OUTPATIENT)
Age: 40
End: 2025-01-27

## 2025-01-27 VITALS
BODY MASS INDEX: 26.24 KG/M2 | DIASTOLIC BLOOD PRESSURE: 80 MMHG | HEIGHT: 73 IN | HEART RATE: 74 BPM | OXYGEN SATURATION: 98 % | WEIGHT: 198 LBS | SYSTOLIC BLOOD PRESSURE: 110 MMHG | RESPIRATION RATE: 14 BRPM

## 2025-01-27 VITALS — DIASTOLIC BLOOD PRESSURE: 80 MMHG | SYSTOLIC BLOOD PRESSURE: 128 MMHG

## 2025-01-27 DIAGNOSIS — E78.5 HYPERLIPIDEMIA, UNSPECIFIED: ICD-10-CM

## 2025-01-27 DIAGNOSIS — Z00.00 ENCOUNTER FOR GENERAL ADULT MEDICAL EXAMINATION W/OUT ABNORMAL FINDINGS: ICD-10-CM

## 2025-01-27 DIAGNOSIS — Z23 ENCOUNTER FOR IMMUNIZATION: ICD-10-CM

## 2025-01-27 DIAGNOSIS — I25.10 ATHEROSCLEROTIC HEART DISEASE OF NATIVE CORONARY ARTERY W/OUT ANGINA PECTORIS: ICD-10-CM

## 2025-01-27 DIAGNOSIS — F41.9 ANXIETY DISORDER, UNSPECIFIED: ICD-10-CM

## 2025-01-27 PROCEDURE — 90656 IIV3 VACC NO PRSV 0.5 ML IM: CPT

## 2025-01-27 PROCEDURE — G0008: CPT

## 2025-01-27 PROCEDURE — 99386 PREV VISIT NEW AGE 40-64: CPT | Mod: 25

## 2025-02-06 ENCOUNTER — APPOINTMENT (OUTPATIENT)
Dept: CARDIOLOGY | Facility: CLINIC | Age: 40
End: 2025-02-06

## 2025-02-27 ENCOUNTER — EMERGENCY (EMERGENCY)
Facility: HOSPITAL | Age: 40
LOS: 1 days | Discharge: HOME | End: 2025-02-27
Attending: EMERGENCY MEDICINE | Admitting: EMERGENCY MEDICINE
Payer: COMMERCIAL

## 2025-02-27 ENCOUNTER — TRANSCRIPTION ENCOUNTER (OUTPATIENT)
Age: 40
End: 2025-02-27

## 2025-02-27 VITALS
TEMPERATURE: 98 F | DIASTOLIC BLOOD PRESSURE: 75 MMHG | SYSTOLIC BLOOD PRESSURE: 135 MMHG | RESPIRATION RATE: 18 BRPM | HEART RATE: 77 BPM | OXYGEN SATURATION: 97 %

## 2025-02-27 VITALS
TEMPERATURE: 98 F | HEIGHT: 72 IN | DIASTOLIC BLOOD PRESSURE: 83 MMHG | WEIGHT: 180.78 LBS | SYSTOLIC BLOOD PRESSURE: 153 MMHG | RESPIRATION RATE: 18 BRPM | HEART RATE: 80 BPM | OXYGEN SATURATION: 98 %

## 2025-02-27 LAB
ALBUMIN SERPL ELPH-MCNC: 4.4 G/DL — SIGNIFICANT CHANGE UP (ref 3.3–5)
ALP SERPL-CCNC: 65 U/L — SIGNIFICANT CHANGE UP (ref 30–120)
ALT FLD-CCNC: 31 U/L — SIGNIFICANT CHANGE UP (ref 10–60)
ANION GAP SERPL CALC-SCNC: 7 MMOL/L — SIGNIFICANT CHANGE UP (ref 5–17)
AST SERPL-CCNC: 24 U/L — SIGNIFICANT CHANGE UP (ref 10–40)
BASOPHILS # BLD AUTO: 0.03 K/UL — SIGNIFICANT CHANGE UP (ref 0–0.2)
BASOPHILS NFR BLD AUTO: 0.4 % — SIGNIFICANT CHANGE UP (ref 0–2)
BILIRUB SERPL-MCNC: 0.4 MG/DL — SIGNIFICANT CHANGE UP (ref 0.2–1.2)
BUN SERPL-MCNC: 17 MG/DL — SIGNIFICANT CHANGE UP (ref 7–23)
CALCIUM SERPL-MCNC: 9.5 MG/DL — SIGNIFICANT CHANGE UP (ref 8.4–10.5)
CHLORIDE SERPL-SCNC: 104 MMOL/L — SIGNIFICANT CHANGE UP (ref 96–108)
CO2 SERPL-SCNC: 30 MMOL/L — SIGNIFICANT CHANGE UP (ref 22–31)
CREAT SERPL-MCNC: 1.03 MG/DL — SIGNIFICANT CHANGE UP (ref 0.5–1.3)
EGFR: 94 ML/MIN/1.73M2 — SIGNIFICANT CHANGE UP
EOSINOPHIL # BLD AUTO: 0.12 K/UL — SIGNIFICANT CHANGE UP (ref 0–0.5)
EOSINOPHIL NFR BLD AUTO: 1.4 % — SIGNIFICANT CHANGE UP (ref 0–6)
GLUCOSE SERPL-MCNC: 121 MG/DL — HIGH (ref 70–99)
HCT VFR BLD CALC: 41.1 % — SIGNIFICANT CHANGE UP (ref 39–50)
HGB BLD-MCNC: 13.6 G/DL — SIGNIFICANT CHANGE UP (ref 13–17)
IMM GRANULOCYTES NFR BLD AUTO: 0.1 % — SIGNIFICANT CHANGE UP (ref 0–0.9)
LIDOCAIN IGE QN: 38 U/L — SIGNIFICANT CHANGE UP (ref 16–77)
LYMPHOCYTES # BLD AUTO: 2.63 K/UL — SIGNIFICANT CHANGE UP (ref 1–3.3)
LYMPHOCYTES # BLD AUTO: 31.2 % — SIGNIFICANT CHANGE UP (ref 13–44)
MAGNESIUM SERPL-MCNC: 2.1 MG/DL — SIGNIFICANT CHANGE UP (ref 1.6–2.6)
MCHC RBC-ENTMCNC: 30.4 PG — SIGNIFICANT CHANGE UP (ref 27–34)
MCHC RBC-ENTMCNC: 33.1 G/DL — SIGNIFICANT CHANGE UP (ref 32–36)
MCV RBC AUTO: 91.7 FL — SIGNIFICANT CHANGE UP (ref 80–100)
MONOCYTES # BLD AUTO: 0.42 K/UL — SIGNIFICANT CHANGE UP (ref 0–0.9)
MONOCYTES NFR BLD AUTO: 5 % — SIGNIFICANT CHANGE UP (ref 2–14)
NEUTROPHILS # BLD AUTO: 5.21 K/UL — SIGNIFICANT CHANGE UP (ref 1.8–7.4)
NEUTROPHILS NFR BLD AUTO: 61.9 % — SIGNIFICANT CHANGE UP (ref 43–77)
NRBC BLD AUTO-RTO: 0 /100 WBCS — SIGNIFICANT CHANGE UP (ref 0–0)
PLATELET # BLD AUTO: 327 K/UL — SIGNIFICANT CHANGE UP (ref 150–400)
POTASSIUM SERPL-MCNC: 4.5 MMOL/L — SIGNIFICANT CHANGE UP (ref 3.5–5.3)
POTASSIUM SERPL-SCNC: 4.5 MMOL/L — SIGNIFICANT CHANGE UP (ref 3.5–5.3)
PROT SERPL-MCNC: 7.5 G/DL — SIGNIFICANT CHANGE UP (ref 6–8.3)
RBC # BLD: 4.48 M/UL — SIGNIFICANT CHANGE UP (ref 4.2–5.8)
RBC # FLD: 11.8 % — SIGNIFICANT CHANGE UP (ref 10.3–14.5)
SODIUM SERPL-SCNC: 141 MMOL/L — SIGNIFICANT CHANGE UP (ref 135–145)
TROPONIN I, HIGH SENSITIVITY RESULT: 5.2 NG/L — SIGNIFICANT CHANGE UP
TROPONIN I, HIGH SENSITIVITY RESULT: 6.2 NG/L — SIGNIFICANT CHANGE UP
WBC # BLD: 8.42 K/UL — SIGNIFICANT CHANGE UP (ref 3.8–10.5)
WBC # FLD AUTO: 8.42 K/UL — SIGNIFICANT CHANGE UP (ref 3.8–10.5)

## 2025-02-27 PROCEDURE — 99285 EMERGENCY DEPT VISIT HI MDM: CPT | Mod: 25

## 2025-02-27 PROCEDURE — 93971 EXTREMITY STUDY: CPT | Mod: 26,LT

## 2025-02-27 PROCEDURE — 36415 COLL VENOUS BLD VENIPUNCTURE: CPT

## 2025-02-27 PROCEDURE — 74177 CT ABD & PELVIS W/CONTRAST: CPT | Mod: MC

## 2025-02-27 PROCEDURE — 93971 EXTREMITY STUDY: CPT

## 2025-02-27 PROCEDURE — 80053 COMPREHEN METABOLIC PANEL: CPT

## 2025-02-27 PROCEDURE — 93010 ELECTROCARDIOGRAM REPORT: CPT

## 2025-02-27 PROCEDURE — 74177 CT ABD & PELVIS W/CONTRAST: CPT | Mod: 26

## 2025-02-27 PROCEDURE — 85025 COMPLETE CBC W/AUTO DIFF WBC: CPT

## 2025-02-27 PROCEDURE — 99285 EMERGENCY DEPT VISIT HI MDM: CPT

## 2025-02-27 PROCEDURE — 96375 TX/PRO/DX INJ NEW DRUG ADDON: CPT | Mod: XU

## 2025-02-27 PROCEDURE — 71275 CT ANGIOGRAPHY CHEST: CPT | Mod: MC

## 2025-02-27 PROCEDURE — 71045 X-RAY EXAM CHEST 1 VIEW: CPT

## 2025-02-27 PROCEDURE — 71275 CT ANGIOGRAPHY CHEST: CPT | Mod: 26

## 2025-02-27 PROCEDURE — 93005 ELECTROCARDIOGRAM TRACING: CPT

## 2025-02-27 PROCEDURE — 96374 THER/PROPH/DIAG INJ IV PUSH: CPT | Mod: XU

## 2025-02-27 PROCEDURE — 83735 ASSAY OF MAGNESIUM: CPT

## 2025-02-27 PROCEDURE — 84484 ASSAY OF TROPONIN QUANT: CPT

## 2025-02-27 PROCEDURE — 83690 ASSAY OF LIPASE: CPT

## 2025-02-27 PROCEDURE — 71045 X-RAY EXAM CHEST 1 VIEW: CPT | Mod: 26

## 2025-02-27 RX ORDER — SUCRALFATE 1 G
1 TABLET ORAL
Qty: 30 | Refills: 0
Start: 2025-02-27 | End: 2025-03-08

## 2025-02-27 RX ORDER — LORAZEPAM 4 MG/ML
1 VIAL (ML) INJECTION ONCE
Refills: 0 | Status: DISCONTINUED | OUTPATIENT
Start: 2025-02-27 | End: 2025-02-27

## 2025-02-27 RX ORDER — KETOROLAC TROMETHAMINE 30 MG/ML
30 INJECTION, SOLUTION INTRAMUSCULAR; INTRAVENOUS ONCE
Refills: 0 | Status: DISCONTINUED | OUTPATIENT
Start: 2025-02-27 | End: 2025-02-27

## 2025-02-27 RX ORDER — ONDANSETRON HCL/PF 4 MG/2 ML
4 VIAL (ML) INJECTION ONCE
Refills: 0 | Status: COMPLETED | OUTPATIENT
Start: 2025-02-27 | End: 2025-02-27

## 2025-02-27 RX ADMIN — Medication 4 MILLIGRAM(S): at 17:37

## 2025-02-27 RX ADMIN — Medication 1000 MILLILITER(S): at 17:37

## 2025-02-27 RX ADMIN — Medication 20 MILLIGRAM(S): at 17:36

## 2025-02-27 RX ADMIN — KETOROLAC TROMETHAMINE 30 MILLIGRAM(S): 30 INJECTION, SOLUTION INTRAMUSCULAR; INTRAVENOUS at 17:37

## 2025-02-27 RX ADMIN — Medication 1 MILLIGRAM(S): at 17:41

## 2025-02-27 NOTE — ED ADULT TRIAGE NOTE - CHIEF COMPLAINT QUOTE
" I have pain on my epigastric area started 2 nights ago "  (+) Nausea No vomiting / diarrhea  PMH HLD Anxiety

## 2025-02-27 NOTE — ED PROVIDER NOTE - CARE PROVIDER_API CALL
Javier Esteban  Gastroenterology  [address] [place] [address]  Phone: [phone]  Fax: [phone]  Follow Up Time: Urgent    Go Carl  Cardiology  [address] [place] [address]  Phone: [phone]  Fax: [phone]  Follow Up Time: Urgent

## 2025-02-27 NOTE — ED PROVIDER NOTE - PATIENT PORTAL LINK FT
You can access the FollowMyHealth Patient Portal offered by [facility] by registering at the following website: [email or web address]. By joining [employer]’s FollowMyHealth portal, you will also be able to view your health information using other applications (apps) compatible with our system.

## 2025-02-27 NOTE — ED PROVIDER NOTE - DIFFERENTIAL DIAGNOSIS
Differential including but not limited to MI ACS PE DVT pneumothorax effusion gastritis pancreatitis enteritis Differential Diagnosis

## 2025-02-27 NOTE — ED ADULT NURSE NOTE - OBJECTIVE STATEMENT
39-year-old male with past medical history of CAD presents with epigastric pain for days.  Patient reports epigastric pressure rating to his chest with nausea without vomiting.  Patient did not take anything for symptoms.  Patient recently had a coronary CT which showed LAD obstruction 50%.  Patient denies fever shortness of breath vomiting diarrhea dysuria lower extremity swelling history of PE or DVT

## 2025-02-27 NOTE — ED PROVIDER NOTE - PROGRESS NOTE DETAILS
pt improved. trop neg x 2 advised gi and cardio follow up . will rx protonix and carafate. hr 73 All imaging and labs reviewed. all results reviewed with pt including abnormal results. pt given a copy of results. pt advised to follow up with pmd regarding abnormal results. All questions answered and concerns addressed. pt verbalized understanding and agreement with plan and dx. pt advised on next step and when/where to follow up. pt advised on all take home and otc medications. pt advised to follow up with PMD. pt advised to return to ed for worsenng symptoms including fever, cp, sob. will dc.

## 2025-02-27 NOTE — ED PROVIDER NOTE - NSFOLLOWUPINSTRUCTIONS_ED_ALL_ED_FT
Abdominal Pain    Many things can cause abdominal pain. Many times, abdominal pain is not caused by a disease and will improve without treatment. Your health care provider will do a physical exam to determine if there is a dangerous cause of your pain; blood tests and imaging may help determine the cause of your pain. However, in many cases, no cause may be found and you may need further testing as an outpatient. Monitor your abdominal pain for any changes.     SEEK IMMEDIATE MEDICAL CARE IF YOU HAVE ANY OF THE FOLLOWING SYMPTOMS: worsening abdominal pain, uncontrollable vomiting, profuse diarrhea, inability to have bowel movements or pass gas, black or bloody stools, fever accompanying chest pain or back pain, or fainting. These symptoms may represent a serious problem that is an emergency. Do not wait to see if the symptoms will go away. Get medical help right away. Call 911 and do not drive yourself to the hospital.  1. FOLLOW UP WITH YOUR PRIMARY DOCTOR IN 24-48 HOURS.   2. FOLLOW UP WITH ALL SPECIALIST DISCUSSED DURING YOUR VISIT.   3. TAKE ALL MEDICATIONS PRESCRIBED IN THE ER IF ANY ARE PRESCRIBED. CONTINUE YOUR HOME MEDICATIONS UNLESS OTHERWISE ADVISED DIFFERENTLY.   4. RETURN FOR WORSENING SYMPTOMS OR CONCERNS INCLUDING BUT NOT LIMITED TO FEVER, CHEST PAIN, OR TROUBLE BREATHING OR ANY OTHER CONCERNS  protonix once daily before a meal  carafate before meals

## 2025-02-27 NOTE — ED PROVIDER NOTE - ATTENDING APP SHARED VISIT CONTRIBUTION OF CARE
Patient complaining of epigastric pain radiating to his chest associated with nausea for past 2 days.  Patient reports has had left calf pain past few evenings none currently.  Patient denies fevers shortness of breath cough vomiting diarrhea urinary complaints edema travel.  No history of PE or DVT.  Patient reports recent coronary CT (in December 2024) which showed 50% blockage of his LAD.    Plan EKG CT chest abdomen pelvis lower extremity labs IV fluid Pepcid Toradol Zofran    Differential including but not limited to MI ACS PE DVT pneumothorax effusion gastritis pancreatitis enteritis

## 2025-02-27 NOTE — ED PROVIDER NOTE - OBJECTIVE STATEMENT
Patient is a 39-year-old male with past medical history of CAD presents with epigastric pain for days.  Patient reports epigastric pressure rating to his chest with nausea without vomiting.  Patient did not take anything for symptoms.  Patient recently had a coronary CT which showed LAD obstruction 50%.  Patient denies fever shortness of breath vomiting diarrhea dysuria lower extremity swelling history of PE or DVT  cardio pannella

## 2025-02-27 NOTE — ED ADULT NURSE NOTE - NS ED NURSE DC INFO COMPLEXITY
Subjective   37-year-old white female with 2-day history of mid epigastric/mid abdominal pain.  Patient states is been constant.  No relieving or exacerbating factors.  Patient has had some nausea.  No vomiting.  No diarrhea.  No blood in her stool.  No burning with urination urinary frequency urgency.  No fever.  Patient states that she has had a history of diverticular disease however this is different than her previous diverticulitis.  She has had some upper abdominal discomfort as well.  No history of gallbladder disease or peptic ulcer disease.  No chest pain pressure tightness squeezing or shortness of breath.          Review of Systems   Constitutional: Negative.  Negative for fever.   HENT: Negative.    Respiratory: Negative.    Cardiovascular: Negative.  Negative for chest pain.   Gastrointestinal: Positive for abdominal pain and nausea. Negative for diarrhea and vomiting.   Endocrine: Negative.    Genitourinary: Negative.  Negative for dysuria.   Skin: Negative.    Neurological: Negative.    Psychiatric/Behavioral: Negative.    All other systems reviewed and are negative.      Past Medical History:   Diagnosis Date   • Anxiety    • Depression    • Diabetes mellitus (HCC)    • Diverticulitis    • GERD (gastroesophageal reflux disease)    • Hypertension    • Kidney stone    • PCOS (polycystic ovarian syndrome)    • Sleep apnea        Allergies   Allergen Reactions   • Dilantin [Phenytoin] Mental Status Change   • Keppra [Levetiracetam] Mental Status Change   • Meperidine Rash   • Topamax [Topiramate] Mental Status Change       Past Surgical History:   Procedure Laterality Date   • CARDIAC CATHETERIZATION     • CARPAL TUNNEL RELEASE     • COLONOSCOPY     • ENDOSCOPY     • FRACTURE SURGERY     • HARDWARE REMOVAL      WRIST   • TENDON REPAIR      HAND       Family History   Problem Relation Age of Onset   • Heart disease Mother    • COPD Mother    • Heart disease Father    • COPD Father        Social History      Socioeconomic History   • Marital status:    Tobacco Use   • Smoking status: Former Smoker   • Smokeless tobacco: Never Used   Vaping Use   • Vaping Use: Never used   Substance and Sexual Activity   • Alcohol use: No   • Drug use: No   • Sexual activity: Defer           Objective   Physical Exam  Vitals and nursing note reviewed.   Constitutional:       General: She is not in acute distress.     Appearance: She is well-developed. She is not diaphoretic.   HENT:      Head: Normocephalic and atraumatic.      Right Ear: External ear normal.      Left Ear: External ear normal.      Nose: Nose normal.   Eyes:      Conjunctiva/sclera: Conjunctivae normal.      Pupils: Pupils are equal, round, and reactive to light.   Neck:      Vascular: No JVD.      Trachea: No tracheal deviation.   Cardiovascular:      Rate and Rhythm: Normal rate and regular rhythm.      Heart sounds: Normal heart sounds. No murmur heard.  Pulmonary:      Effort: Pulmonary effort is normal. No respiratory distress.      Breath sounds: Normal breath sounds. No wheezing.   Abdominal:      General: Bowel sounds are normal.      Palpations: Abdomen is soft.      Tenderness: There is generalized abdominal tenderness. There is no guarding or rebound. Negative signs include Calderon's sign, psoas sign and obturator sign.   Musculoskeletal:         General: No deformity. Normal range of motion.      Cervical back: Normal range of motion and neck supple.   Skin:     General: Skin is warm and dry.      Coloration: Skin is not pale.      Findings: No erythema or rash.   Neurological:      Mental Status: She is alert and oriented to person, place, and time.      Cranial Nerves: No cranial nerve deficit.   Psychiatric:         Behavior: Behavior normal.         Thought Content: Thought content normal.         Procedures           ED Course  ED Course as of 07/09/22 1645   Sat Jul 09, 2022   1328 ECG 13:21 NSR, rate 92. Normal ECG. QT/QTc  [MISHEL]   1345 ECG  13:21 NSR, rate 92. Normal eCG. QT/QTc 358/442 [MISHEL]   1512 Reviewed patient's work-up.  Reviewed also signs and symptoms worsening on appropriate follow-up.  She voices understanding.  She declines anything further for pain. [JI]      ED Course User Index  [JI] Roel Clark PA  [MISHEL] Morales Brice MD                                           MDM  Number of Diagnoses or Management Options  Abdominal pain, unspecified abdominal location: new and requires workup  Constipation, unspecified constipation type: new and requires workup     Amount and/or Complexity of Data Reviewed  Clinical lab tests: reviewed and ordered  Tests in the radiology section of CPT®: reviewed and ordered  Tests in the medicine section of CPT®: reviewed and ordered  Discuss the patient with other providers: yes  Independent visualization of images, tracings, or specimens: yes        Final diagnoses:   Abdominal pain, unspecified abdominal location   Constipation, unspecified constipation type       ED Disposition  ED Disposition     ED Disposition   Discharge    Condition   Stable    Comment   --             Shawna Lambert DO  [address] [facility] [address]  [phone]    Schedule an appointment as soon as possible for a visit       [facility] Emergency Department  [address] [place] [id number] [phone]    If symptoms worsen         Medication List      New Prescriptions    diclofenac 50 MG EC tablet  Commonly known as: VOLTAREN  Take 1 tablet by mouth 3 (Three) Times a Day As Needed (pain).     polyethylene glycol 17 GM/SCOOP powder  Commonly known as: MIRALAX  Take 17 g by mouth Daily.        Changed    lisinopril 40 MG tablet  Commonly known as: PRINIVIL,ZESTRIL  Take 1 tablet by mouth Daily.  What changed: Another medication with the same name was removed. Continue taking this medication, and follow the directions you see here.     omeprazole 40 MG capsule  Commonly known as: priLOSEC  TAKE  ONE (1) CAPSULE BY MOUTH EVERY DAY BEFORE A MEAL  What changed: Another medication with the same name was removed. Continue taking this medication, and follow the directions you see here.     ondansetron 4 MG tablet  Commonly known as: ZOFRAN  Take 1 tablet by mouth Every 8 (Eight) Hours As Needed for Nausea or Vomiting.  What changed: See the new instructions.     rosuvastatin 40 MG tablet  Commonly known as: CRESTOR  Take 1 tablet by mouth Every Night at bedtime  What changed: Another medication with the same name was removed. Continue taking this medication, and follow the directions you see here.           Where to Get Your Medications      You can get these medications from any pharmacy    Bring a paper prescription for each of these medications  · diclofenac 50 MG EC tablet  · ondansetron 4 MG tablet  · polyethylene glycol 17 GM/SCOOP powder          Roel Clark PA  07/09/22 [id number]     Simple: Patient demonstrates quick and easy understanding

## 2025-03-03 PROBLEM — I25.10 ATHEROSCLEROTIC HEART DISEASE OF NATIVE CORONARY ARTERY WITHOUT ANGINA PECTORIS: Chronic | Status: ACTIVE | Noted: 2025-02-27

## 2025-03-05 ENCOUNTER — APPOINTMENT (OUTPATIENT)
Dept: CARDIOLOGY | Facility: CLINIC | Age: 40
End: 2025-03-05
Payer: COMMERCIAL

## 2025-03-05 ENCOUNTER — NON-APPOINTMENT (OUTPATIENT)
Age: 40
End: 2025-03-05

## 2025-03-05 VITALS
BODY MASS INDEX: 27.17 KG/M2 | DIASTOLIC BLOOD PRESSURE: 87 MMHG | SYSTOLIC BLOOD PRESSURE: 132 MMHG | OXYGEN SATURATION: 98 % | HEART RATE: 74 BPM | HEIGHT: 73 IN | WEIGHT: 205 LBS

## 2025-03-05 VITALS — DIASTOLIC BLOOD PRESSURE: 76 MMHG | SYSTOLIC BLOOD PRESSURE: 118 MMHG

## 2025-03-05 DIAGNOSIS — K21.9 GASTRO-ESOPHAGEAL REFLUX DISEASE W/OUT ESOPHAGITIS: ICD-10-CM

## 2025-03-05 DIAGNOSIS — I25.10 ATHEROSCLEROTIC HEART DISEASE OF NATIVE CORONARY ARTERY W/OUT ANGINA PECTORIS: ICD-10-CM

## 2025-03-05 PROCEDURE — G2211 COMPLEX E/M VISIT ADD ON: CPT | Mod: NC

## 2025-03-05 PROCEDURE — 93000 ELECTROCARDIOGRAM COMPLETE: CPT

## 2025-03-05 PROCEDURE — 99215 OFFICE O/P EST HI 40 MIN: CPT

## 2025-03-05 RX ORDER — SUCRALFATE 1 G/1
1 TABLET ORAL
Qty: 180 | Refills: 0 | Status: ACTIVE | COMMUNITY
Start: 2025-03-05

## 2025-03-05 RX ORDER — PANTOPRAZOLE 40 MG/1
40 TABLET, DELAYED RELEASE ORAL DAILY
Qty: 30 | Refills: 2 | Status: ACTIVE | COMMUNITY
Start: 2025-03-05

## 2025-03-17 ENCOUNTER — LABORATORY RESULT (OUTPATIENT)
Age: 40
End: 2025-03-17

## 2025-03-17 ENCOUNTER — APPOINTMENT (OUTPATIENT)
Dept: CARDIOLOGY | Facility: CLINIC | Age: 40
End: 2025-03-17
Payer: COMMERCIAL

## 2025-03-17 PROCEDURE — 93306 TTE W/DOPPLER COMPLETE: CPT

## 2025-03-18 ENCOUNTER — APPOINTMENT (OUTPATIENT)
Dept: CARDIOLOGY | Facility: CLINIC | Age: 40
End: 2025-03-18
Payer: COMMERCIAL

## 2025-03-18 PROCEDURE — 93015 CV STRESS TEST SUPVJ I&R: CPT

## 2025-03-19 ENCOUNTER — TRANSCRIPTION ENCOUNTER (OUTPATIENT)
Age: 40
End: 2025-03-19

## 2025-03-19 ENCOUNTER — APPOINTMENT (OUTPATIENT)
Dept: CARDIOLOGY | Facility: CLINIC | Age: 40
End: 2025-03-19

## 2025-03-20 ENCOUNTER — APPOINTMENT (OUTPATIENT)
Dept: INTERNAL MEDICINE | Facility: CLINIC | Age: 40
End: 2025-03-20
Payer: COMMERCIAL

## 2025-03-20 VITALS
RESPIRATION RATE: 14 BRPM | DIASTOLIC BLOOD PRESSURE: 66 MMHG | SYSTOLIC BLOOD PRESSURE: 122 MMHG | TEMPERATURE: 98.8 F | WEIGHT: 206 LBS | HEIGHT: 73 IN | HEART RATE: 74 BPM | OXYGEN SATURATION: 98 % | BODY MASS INDEX: 27.3 KG/M2

## 2025-03-20 DIAGNOSIS — E78.5 HYPERLIPIDEMIA, UNSPECIFIED: ICD-10-CM

## 2025-03-20 DIAGNOSIS — F41.9 ANXIETY DISORDER, UNSPECIFIED: ICD-10-CM

## 2025-03-20 DIAGNOSIS — N20.0 CALCULUS OF KIDNEY: ICD-10-CM

## 2025-03-20 PROCEDURE — 99214 OFFICE O/P EST MOD 30 MIN: CPT

## 2025-03-20 PROCEDURE — G2211 COMPLEX E/M VISIT ADD ON: CPT | Mod: NC

## 2025-03-28 ENCOUNTER — APPOINTMENT (OUTPATIENT)
Dept: UROLOGY | Facility: CLINIC | Age: 40
End: 2025-03-28
Payer: COMMERCIAL

## 2025-03-28 VITALS
WEIGHT: 200 LBS | HEART RATE: 80 BPM | OXYGEN SATURATION: 96 % | HEIGHT: 72 IN | SYSTOLIC BLOOD PRESSURE: 114 MMHG | BODY MASS INDEX: 27.09 KG/M2 | DIASTOLIC BLOOD PRESSURE: 75 MMHG

## 2025-03-28 DIAGNOSIS — Z86.79 PERSONAL HISTORY OF OTHER DISEASES OF THE CIRCULATORY SYSTEM: ICD-10-CM

## 2025-03-28 DIAGNOSIS — N20.0 CALCULUS OF KIDNEY: ICD-10-CM

## 2025-03-28 PROCEDURE — 99204 OFFICE O/P NEW MOD 45 MIN: CPT

## 2025-03-28 RX ORDER — ESCITALOPRAM OXALATE 10 MG/1
10 TABLET, FILM COATED ORAL
Refills: 0 | Status: ACTIVE | COMMUNITY

## 2025-03-31 LAB
ALBUMIN SERPL ELPH-MCNC: 4.6 G/DL
ALP BLD-CCNC: 68 U/L
ALT SERPL-CCNC: 25 U/L
ANION GAP SERPL CALC-SCNC: 10 MMOL/L
APPEARANCE: CLEAR
APTT BLD: 40.4 SEC
AST SERPL-CCNC: 27 U/L
BACTERIA UR CULT: NORMAL
BACTERIA: NEGATIVE /HPF
BASOPHILS # BLD AUTO: 0.03 K/UL
BASOPHILS NFR BLD AUTO: 0.5 %
BILIRUB SERPL-MCNC: 0.4 MG/DL
BILIRUBIN URINE: NEGATIVE
BLOOD URINE: NEGATIVE
BUN SERPL-MCNC: 18 MG/DL
CALCIUM SERPL-MCNC: 9.8 MG/DL
CAST: 0 /LPF
CHLORIDE SERPL-SCNC: 106 MMOL/L
CO2 SERPL-SCNC: 28 MMOL/L
COLOR: YELLOW
CREAT SERPL-MCNC: 0.93 MG/DL
EGFRCR SERPLBLD CKD-EPI 2021: 105 ML/MIN/1.73M2
EOSINOPHIL # BLD AUTO: 0.18 K/UL
EOSINOPHIL NFR BLD AUTO: 2.9 %
EPITHELIAL CELLS: 0 /HPF
ESTIMATED AVERAGE GLUCOSE: 105 MG/DL
GLUCOSE QUALITATIVE U: NEGATIVE MG/DL
GLUCOSE SERPL-MCNC: 90 MG/DL
HBA1C MFR BLD HPLC: 5.3 %
HCT VFR BLD CALC: 45.4 %
HGB BLD-MCNC: 14.3 G/DL
IMM GRANULOCYTES NFR BLD AUTO: 0.2 %
INR PPP: 1.01 RATIO
KETONES URINE: NEGATIVE MG/DL
LEUKOCYTE ESTERASE URINE: NEGATIVE
LYMPHOCYTES # BLD AUTO: 2.05 K/UL
LYMPHOCYTES NFR BLD AUTO: 32.5 %
MAN DIFF?: NORMAL
MCHC RBC-ENTMCNC: 30.6 PG
MCHC RBC-ENTMCNC: 31.5 G/DL
MCV RBC AUTO: 97.2 FL
MICROSCOPIC-UA: NORMAL
MONOCYTES # BLD AUTO: 0.33 K/UL
MONOCYTES NFR BLD AUTO: 5.2 %
NEUTROPHILS # BLD AUTO: 3.71 K/UL
NEUTROPHILS NFR BLD AUTO: 58.7 %
NITRITE URINE: NEGATIVE
PH URINE: 7
PLATELET # BLD AUTO: 338 K/UL
POTASSIUM SERPL-SCNC: 4.8 MMOL/L
PROT SERPL-MCNC: 7 G/DL
PROTEIN URINE: NORMAL MG/DL
PT BLD: 12 SEC
RBC # BLD: 4.67 M/UL
RBC # FLD: 12.4 %
RED BLOOD CELLS URINE: 1 /HPF
SODIUM SERPL-SCNC: 144 MMOL/L
SPECIFIC GRAVITY URINE: 1.02
UROBILINOGEN URINE: 0.2 MG/DL
WBC # FLD AUTO: 6.31 K/UL
WHITE BLOOD CELLS URINE: 0 /HPF

## 2025-04-18 ENCOUNTER — OUTPATIENT (OUTPATIENT)
Dept: OUTPATIENT SERVICES | Facility: HOSPITAL | Age: 40
LOS: 1 days | Discharge: HOME | End: 2025-04-18

## 2025-04-18 DIAGNOSIS — Z01.818 ENCOUNTER FOR OTHER PREPROCEDURAL EXAMINATION: ICD-10-CM

## 2025-04-21 ENCOUNTER — LABORATORY RESULT (OUTPATIENT)
Age: 40
End: 2025-04-21

## 2025-04-21 ENCOUNTER — OUTPATIENT (OUTPATIENT)
Dept: OUTPATIENT SERVICES | Facility: HOSPITAL | Age: 40
LOS: 1 days | End: 2025-04-21
Payer: COMMERCIAL

## 2025-04-21 ENCOUNTER — APPOINTMENT (OUTPATIENT)
Dept: HEMATOLOGY ONCOLOGY | Facility: CLINIC | Age: 40
End: 2025-04-21
Payer: COMMERCIAL

## 2025-04-21 VITALS
WEIGHT: 199.96 LBS | TEMPERATURE: 97 F | OXYGEN SATURATION: 98 % | RESPIRATION RATE: 15 BRPM | HEIGHT: 73 IN | HEART RATE: 68 BPM | SYSTOLIC BLOOD PRESSURE: 106 MMHG | DIASTOLIC BLOOD PRESSURE: 72 MMHG

## 2025-04-21 VITALS
DIASTOLIC BLOOD PRESSURE: 78 MMHG | BODY MASS INDEX: 27.5 KG/M2 | WEIGHT: 203.03 LBS | HEART RATE: 62 BPM | SYSTOLIC BLOOD PRESSURE: 114 MMHG | OXYGEN SATURATION: 97 % | HEIGHT: 72 IN

## 2025-04-21 DIAGNOSIS — N20.0 CALCULUS OF KIDNEY: ICD-10-CM

## 2025-04-21 DIAGNOSIS — Z01.818 ENCOUNTER FOR OTHER PREPROCEDURAL EXAMINATION: ICD-10-CM

## 2025-04-21 DIAGNOSIS — K08.409 PARTIAL LOSS OF TEETH, UNSPECIFIED CAUSE, UNSPECIFIED CLASS: Chronic | ICD-10-CM

## 2025-04-21 LAB
APPEARANCE UR: CLEAR — SIGNIFICANT CHANGE UP
BILIRUB UR-MCNC: NEGATIVE — SIGNIFICANT CHANGE UP
COLOR SPEC: YELLOW — SIGNIFICANT CHANGE UP
DIFF PNL FLD: NEGATIVE — SIGNIFICANT CHANGE UP
GLUCOSE UR QL: NEGATIVE MG/DL — SIGNIFICANT CHANGE UP
KETONES UR-MCNC: NEGATIVE MG/DL — SIGNIFICANT CHANGE UP
LEUKOCYTE ESTERASE UR-ACNC: NEGATIVE — SIGNIFICANT CHANGE UP
NITRITE UR-MCNC: NEGATIVE — SIGNIFICANT CHANGE UP
PH UR: 6.5 — SIGNIFICANT CHANGE UP (ref 5–8)
PROT UR-MCNC: NEGATIVE MG/DL — SIGNIFICANT CHANGE UP
SP GR SPEC: 1.01 — SIGNIFICANT CHANGE UP (ref 1–1.03)
UROBILINOGEN FLD QL: 0.2 MG/DL — SIGNIFICANT CHANGE UP (ref 0.2–1)

## 2025-04-21 PROCEDURE — 87086 URINE CULTURE/COLONY COUNT: CPT

## 2025-04-21 PROCEDURE — G0463: CPT

## 2025-04-21 PROCEDURE — 81003 URINALYSIS AUTO W/O SCOPE: CPT

## 2025-04-21 PROCEDURE — 99205 OFFICE O/P NEW HI 60 MIN: CPT

## 2025-04-21 RX ORDER — ESCITALOPRAM OXALATE 20 MG/1
1 TABLET ORAL
Refills: 0 | DISCHARGE

## 2025-04-21 RX ORDER — ASPIRIN 325 MG
1 TABLET ORAL
Refills: 0 | DISCHARGE

## 2025-04-21 RX ORDER — ROSUVASTATIN CALCIUM 20 MG/1
1 TABLET, FILM COATED ORAL
Refills: 0 | DISCHARGE

## 2025-04-21 NOTE — H&P PST ADULT - PROBLEM SELECTOR PLAN 1
Cystoscopy - Left Ureteroscopy - Laser Lithotripsy - Poss Stent Placement by Dr. Dylan Barnes on 4/30/2025.

## 2025-04-21 NOTE — H&P PST ADULT - HISTORY OF PRESENT ILLNESS
41 y/o male with CAD, HLD, Nephrolithiasis, GERD, and Anxiety & SHx [place] Teeth Extraction for PST having Cystoscopy - Left Ureteroscopy - Laser Lithotripsy - Poss Stent Placement by Dr. Dylan Barnes on 4/30/2025.  He reports incidental finding of B/L Renal Calculi on abdominal imaging 2/2025 and was referred to .  Seen by provider and recommended for the procedure.

## 2025-04-21 NOTE — H&P PST ADULT - NSICDXPROCEDURE_GEN_ALL_CORE_FT
PROCEDURES:  Cystoureteroscopy, with lithotripsy using laser and ureteral stent insertion 21-Apr-2025 08:58:29 LEFT Anastacia Moreno

## 2025-04-21 NOTE — H&P PST ADULT - PROBLEM SELECTOR PLAN 2
Labs CBC, CMP, PT/PTT/INR, A1C, and EKG from 3/5/2025, 3/25/2025, and 4/10/2025  Labs UA/Cx pending  Cardiac and Hematology clearances per surgeon  Pre op and Hibiclens instructions reviewed and given.   Take routine am meds with sip of water.   Instructed to hold and/or avoid other NSAIDs and OTC supplements. Tylenol is ok. Verbalized understanding

## 2025-04-21 NOTE — H&P PST ADULT - NSICDXPASTMEDICALHX_GEN_ALL_CORE_FT
PAST MEDICAL HISTORY:  Anxiety     CAD (coronary artery disease)     GERD (gastroesophageal reflux disease)     Hyperlipidemia     Nephrolithiasis

## 2025-04-21 NOTE — H&P PST ADULT - OTHER CARE PROVIDERS
Dr. Mullen, Cardiology ([phone]) calling for appt & [facility] Hematology ([phone]) appt 4/21/2025 in PM

## 2025-04-21 NOTE — H&P PST ADULT - NSICDXFAMILYHX_GEN_ALL_CORE_FT
FAMILY HISTORY:  Mother  Still living? No  FH: myocardial infarction, Age at diagnosis: Age Unknown    Sibling  Still living? Unknown  FH: multiple sclerosis, Age at diagnosis: Age Unknown

## 2025-04-22 LAB
CULTURE RESULTS: NO GROWTH — SIGNIFICANT CHANGE UP
SPECIMEN SOURCE: SIGNIFICANT CHANGE UP

## 2025-04-23 LAB
APTT 2H P 1:4 NP PPP: 33.6 SEC
APTT 2H P INC PPP: 34.3 SEC
APTT IMM NP/PRE NP PPP: 33.1 SEC
APTT INV RATIO PPP: 36.9 SEC
FACT IX ACT/NOR PPP: 38 %
FACT VIII ACT/NOR PPP: 68 %
FACT XI ACT/NOR PPP: 81 %
FACT XII PPP CHRO-ACNC: 115 %
NPP NORMAL POOLED PLASMA: 31.6 SEC

## 2025-04-28 ENCOUNTER — NON-APPOINTMENT (OUTPATIENT)
Age: 40
End: 2025-04-28

## 2025-04-28 PROBLEM — N20.0 CALCULUS OF KIDNEY: Chronic | Status: ACTIVE | Noted: 2025-04-21

## 2025-04-28 PROBLEM — K21.9 GASTRO-ESOPHAGEAL REFLUX DISEASE WITHOUT ESOPHAGITIS: Chronic | Status: ACTIVE | Noted: 2025-04-21

## 2025-04-28 PROBLEM — E78.5 HYPERLIPIDEMIA, UNSPECIFIED: Chronic | Status: ACTIVE | Noted: 2025-04-21

## 2025-04-29 ENCOUNTER — APPOINTMENT (OUTPATIENT)
Dept: HEMATOLOGY ONCOLOGY | Facility: CLINIC | Age: 40
End: 2025-04-29
Payer: COMMERCIAL

## 2025-04-29 DIAGNOSIS — R79.1 ABNORMAL COAGULATION PROFILE: ICD-10-CM

## 2025-04-29 DIAGNOSIS — N20.0 CALCULUS OF KIDNEY: ICD-10-CM

## 2025-04-29 LAB
APTT BLD: 36.9 SEC
LUPUS ANTICOAGULANT CASCADE REFLEX: NORMAL

## 2025-04-29 PROCEDURE — 99213 OFFICE O/P EST LOW 20 MIN: CPT | Mod: 95

## 2025-05-01 NOTE — ED ADULT NURSE NOTE - CHIEF COMPLAINT QUOTE
Checked on bed, connected to monitor,  with unlabored respirations. Vital signs is stable.   Denied any new complaints. No current needs identified.  Gurney in low position, side rail up for pt safety. Call light within reach.   " I have pain on my epigastric area started 2 nights ago "  (+) Nausea No vomiting / diarrhea  PMH HLD Anxiety

## 2025-05-14 ENCOUNTER — APPOINTMENT (OUTPATIENT)
Dept: HEMATOLOGY ONCOLOGY | Facility: CLINIC | Age: 40
End: 2025-05-14

## 2025-05-15 LAB — FACT IX ACT/NOR PPP: 37 %

## 2025-05-17 ENCOUNTER — LABORATORY RESULT (OUTPATIENT)
Age: 40
End: 2025-05-17

## 2025-05-19 LAB
APPEARANCE: ABNORMAL
BACTERIA: NEGATIVE /HPF
BILIRUBIN URINE: NEGATIVE
BLOOD URINE: NEGATIVE
CAST: 0 /LPF
COLOR: YELLOW
EPITHELIAL CELLS: 0 /HPF
GLUCOSE QUALITATIVE U: NEGATIVE MG/DL
KETONES URINE: NEGATIVE MG/DL
LEUKOCYTE ESTERASE URINE: NEGATIVE
MICROSCOPIC-UA: NORMAL
NITRITE URINE: NEGATIVE
PH URINE: 7.5
PROTEIN URINE: NEGATIVE MG/DL
RED BLOOD CELLS URINE: 1 /HPF
SPECIFIC GRAVITY URINE: 1.01
UROBILINOGEN URINE: 0.2 MG/DL
WHITE BLOOD CELLS URINE: 0 /HPF

## 2025-05-22 ENCOUNTER — TRANSCRIPTION ENCOUNTER (OUTPATIENT)
Age: 40
End: 2025-05-22

## 2025-05-22 ENCOUNTER — OUTPATIENT (OUTPATIENT)
Dept: OUTPATIENT SERVICES | Facility: HOSPITAL | Age: 40
LOS: 1 days | End: 2025-05-22
Payer: COMMERCIAL

## 2025-05-22 ENCOUNTER — APPOINTMENT (OUTPATIENT)
Dept: UROLOGY | Facility: HOSPITAL | Age: 40
End: 2025-05-22

## 2025-05-22 VITALS
OXYGEN SATURATION: 98 % | DIASTOLIC BLOOD PRESSURE: 80 MMHG | SYSTOLIC BLOOD PRESSURE: 135 MMHG | RESPIRATION RATE: 15 BRPM | HEART RATE: 66 BPM

## 2025-05-22 VITALS
HEART RATE: 70 BPM | DIASTOLIC BLOOD PRESSURE: 81 MMHG | WEIGHT: 195.11 LBS | SYSTOLIC BLOOD PRESSURE: 124 MMHG | TEMPERATURE: 98 F | HEIGHT: 73 IN | RESPIRATION RATE: 16 BRPM | OXYGEN SATURATION: 100 %

## 2025-05-22 DIAGNOSIS — N20.0 CALCULUS OF KIDNEY: ICD-10-CM

## 2025-05-22 DIAGNOSIS — K08.409 PARTIAL LOSS OF TEETH, UNSPECIFIED CAUSE, UNSPECIFIED CLASS: Chronic | ICD-10-CM

## 2025-05-22 PROCEDURE — C1769: CPT

## 2025-05-22 PROCEDURE — C2617: CPT

## 2025-05-22 PROCEDURE — 76000 FLUOROSCOPY <1 HR PHYS/QHP: CPT

## 2025-05-22 PROCEDURE — C1747: CPT

## 2025-05-22 PROCEDURE — 52344 CYSTO/URETERO STRICTURE TX: CPT | Mod: LT

## 2025-05-22 PROCEDURE — C1758: CPT

## 2025-05-22 PROCEDURE — C9761: CPT | Mod: LT

## 2025-05-22 PROCEDURE — C1889: CPT

## 2025-05-22 PROCEDURE — 74420 UROGRAPHY RTRGR +-KUB: CPT | Mod: 26

## 2025-05-22 PROCEDURE — 52346 CYSTOURETERO W/RENAL STRICT: CPT | Mod: LT

## 2025-05-22 DEVICE — URETERAL STENT TRIA SOFT 6FR 26MM: Type: IMPLANTABLE DEVICE | Status: FUNCTIONAL

## 2025-05-22 DEVICE — URETERAL SHEATH NAVIGATOR HD 12/14FR X 46CM: Type: IMPLANTABLE DEVICE | Status: FUNCTIONAL

## 2025-05-22 DEVICE — URETEROSCOPE CVAC 2 STEERABLE IRR ASP DISP: Type: IMPLANTABLE DEVICE | Status: FUNCTIONAL

## 2025-05-22 DEVICE — URETERAL CATH OPEN END FLEXI-TIP 5FR .038" X 70CM: Type: IMPLANTABLE DEVICE | Status: FUNCTIONAL

## 2025-05-22 DEVICE — LASER FIBER MOSES 200 D/F/L: Type: IMPLANTABLE DEVICE | Status: FUNCTIONAL

## 2025-05-22 DEVICE — GUIDEWIRE SENSOR DUAL-FLEX NITINOL STRAIGHT .035" X 150CM: Type: IMPLANTABLE DEVICE | Status: FUNCTIONAL

## 2025-05-22 RX ORDER — ONDANSETRON HCL/PF 4 MG/2 ML
4 VIAL (ML) INJECTION ONCE
Refills: 0 | Status: COMPLETED | OUTPATIENT
Start: 2025-05-22 | End: 2025-05-22

## 2025-05-22 RX ORDER — TAMSULOSIN HYDROCHLORIDE 0.4 MG/1
1 CAPSULE ORAL
Qty: 30 | Refills: 0
Start: 2025-05-22 | End: 2025-06-20

## 2025-05-22 RX ORDER — OXYCODONE HYDROCHLORIDE 30 MG/1
1 TABLET ORAL
Qty: 28 | Refills: 0
Start: 2025-05-22 | End: 2025-05-28

## 2025-05-22 RX ORDER — SODIUM CHLORIDE 9 G/1000ML
1000 INJECTION, SOLUTION INTRAVENOUS
Refills: 0 | Status: DISCONTINUED | OUTPATIENT
Start: 2025-05-22 | End: 2025-05-22

## 2025-05-22 RX ORDER — GENTAMICIN SULFATE 40 MG/ML
160 VIAL (ML) INJECTION ONCE
Refills: 0 | Status: COMPLETED | OUTPATIENT
Start: 2025-05-22 | End: 2025-05-22

## 2025-05-22 RX ORDER — OXYCODONE HYDROCHLORIDE 30 MG/1
5 TABLET ORAL ONCE
Refills: 0 | Status: DISCONTINUED | OUTPATIENT
Start: 2025-05-22 | End: 2025-05-22

## 2025-05-22 RX ORDER — NALOXONE HYDROCHLORIDE 0.4 MG/ML
1 INJECTION, SOLUTION INTRAMUSCULAR; INTRAVENOUS; SUBCUTANEOUS
Qty: 1 | Refills: 0
Start: 2025-05-22

## 2025-05-22 RX ORDER — CEFAZOLIN SODIUM IN 0.9 % NACL 3 G/100 ML
2000 INTRAVENOUS SOLUTION, PIGGYBACK (ML) INTRAVENOUS ONCE
Refills: 0 | Status: COMPLETED | OUTPATIENT
Start: 2025-05-22 | End: 2025-05-22

## 2025-05-22 RX ORDER — IBUPROFEN 200 MG
1 TABLET ORAL
Qty: 28 | Refills: 0
Start: 2025-05-22 | End: 2025-05-28

## 2025-05-22 RX ORDER — HYDROMORPHONE/SOD CHLOR,ISO/PF 2 MG/10 ML
0.5 SYRINGE (ML) INJECTION
Refills: 0 | Status: DISCONTINUED | OUTPATIENT
Start: 2025-05-22 | End: 2025-05-22

## 2025-05-22 RX ORDER — ACETAMINOPHEN 500 MG/5ML
2 LIQUID (ML) ORAL
Qty: 56 | Refills: 0
Start: 2025-05-22 | End: 2025-05-28

## 2025-05-22 RX ORDER — SULFAMETHOXAZOLE/TRIMETHOPRIM 800-160 MG
1 TABLET ORAL
Qty: 14 | Refills: 0
Start: 2025-05-22 | End: 2025-05-28

## 2025-05-22 RX ADMIN — Medication 0.5 MILLIGRAM(S): at 10:12

## 2025-05-22 RX ADMIN — Medication 0.5 MILLIGRAM(S): at 10:27

## 2025-05-22 RX ADMIN — Medication 4 MILLIGRAM(S): at 10:06

## 2025-05-22 RX ADMIN — SODIUM CHLORIDE 75 MILLILITER(S): 9 INJECTION, SOLUTION INTRAVENOUS at 10:07

## 2025-05-22 NOTE — BRIEF OPERATIVE NOTE - OPERATION/FINDINGS
Please see Op Report
1cm renal calcification outside of collecting system  Left lower calyx intrarenal stricture  left distal ureteral stricture

## 2025-05-22 NOTE — ASU DISCHARGE PLAN (ADULT/PEDIATRIC) - FINANCIAL ASSISTANCE
[facility] provides services at a reduced cost to those who are determined to be eligible through [facility]’s financial assistance program. Information regarding [facility]’s financial assistance program can be found by going to [email or web address] or by calling [phone].

## 2025-05-22 NOTE — ASU DISCHARGE PLAN (ADULT/PEDIATRIC) - NS MD DC FALL RISK RISK
For information on Fall & Injury Prevention, visit: [email or web address] OR  [email or web address] OR  https://www.cdc.gov/steadi/patient.html

## 2025-05-22 NOTE — BRIEF OPERATIVE NOTE - NSICDXBRIEFPROCEDURE_GEN_ALL_CORE_FT
PROCEDURES:  Cystoscopy, with ureteral stricture dilation 22-May-2025 09:21:51  Ez Barnes  
PROCEDURES:  Left ureteroscopy 22-May-2025 09:34:44  Lisbeth Macias  Cystoscopy 22-May-2025 09:34:51  Lisbeht Macias  Laser lithotripsy, renal calculus 22-May-2025 09:35:04  Lisbeth Macias  Insertion, stent, ureter 22-May-2025 09:35:31  Lisbeth Macias

## 2025-05-22 NOTE — ASU DISCHARGE PLAN (ADULT/PEDIATRIC) - COMMENTS
May take Tylenol at 2 pm today for pain or fever...you received Tylenol at 8am in the operating room...

## 2025-05-22 NOTE — ASU DISCHARGE PLAN (ADULT/PEDIATRIC) - CARE PROVIDER_API CALL
Ez Mendenhall  Urology  [address] [place] [id number]  Phone: [phone]  Fax: [phone]  Follow Up Time: 1 week

## 2025-05-22 NOTE — H&P PST ADULT - OTHER CARE PROVIDERS
Dr. Mullen, Cardiology ([phone]) calling for appt & [facility] Hematology ([phone]) appt 4/21/2025 in PM

## 2025-05-22 NOTE — H&P PST ADULT - HISTORY OF PRESENT ILLNESS
39 y/o male with CAD, HLD, Nephrolithiasis, GERD, and Anxiety & SHx [place] Teeth Extraction for PST having Cystoscopy - Left Ureteroscopy - Laser Lithotripsy - Poss Stent Placement by Dr. Dylan Barnes on 4/30/2025.  He reports incidental finding of B/L Renal Calculi on abdominal imaging 2/2025 and was referred to .  Seen by provider and recommended for the procedure.

## 2025-05-22 NOTE — BRIEF OPERATIVE NOTE - NSICDXBRIEFPOSTOP_GEN_ALL_CORE_FT
POST-OP DIAGNOSIS:  Left nephrolithiasis 22-May-2025 09:35:59  Lisbeth Macias  
POST-OP DIAGNOSIS:  Ureteral stricture, left 22-May-2025 09:23:21  Ez Barnes

## 2025-05-28 ENCOUNTER — APPOINTMENT (OUTPATIENT)
Dept: UROLOGY | Facility: CLINIC | Age: 40
End: 2025-05-28
Payer: COMMERCIAL

## 2025-05-28 VITALS
OXYGEN SATURATION: 97 % | TEMPERATURE: 97.5 F | SYSTOLIC BLOOD PRESSURE: 156 MMHG | BODY MASS INDEX: 27.13 KG/M2 | DIASTOLIC BLOOD PRESSURE: 80 MMHG | HEART RATE: 79 BPM | WEIGHT: 200 LBS

## 2025-05-28 DIAGNOSIS — N20.0 CALCULUS OF KIDNEY: ICD-10-CM

## 2025-05-28 PROCEDURE — 52310 CYSTOSCOPY AND TREATMENT: CPT

## 2025-05-28 PROCEDURE — 81003 URINALYSIS AUTO W/O SCOPE: CPT | Mod: QW

## 2025-05-28 PROCEDURE — 99213 OFFICE O/P EST LOW 20 MIN: CPT | Mod: 25

## 2025-05-29 LAB
BILIRUB UR QL STRIP: NEGATIVE
CLARITY UR: CLEAR
COLLECTION METHOD: NORMAL
GLUCOSE UR-MCNC: NEGATIVE
HCG UR QL: 0.2 EU/DL
HGB UR QL STRIP.AUTO: NORMAL
KETONES UR-MCNC: NEGATIVE
LEUKOCYTE ESTERASE UR QL STRIP: NORMAL
NITRITE UR QL STRIP: NEGATIVE
PH UR STRIP: 6.5
PROT UR STRIP-MCNC: 100
SP GR UR STRIP: 1.01

## 2025-06-06 ENCOUNTER — NON-APPOINTMENT (OUTPATIENT)
Age: 40
End: 2025-06-06

## 2025-06-07 ENCOUNTER — EMERGENCY (EMERGENCY)
Facility: HOSPITAL | Age: 40
LOS: 1 days | End: 2025-06-07
Attending: EMERGENCY MEDICINE | Admitting: EMERGENCY MEDICINE
Payer: COMMERCIAL

## 2025-06-07 ENCOUNTER — INPATIENT (INPATIENT)
Facility: HOSPITAL | Age: 40
LOS: 0 days | Discharge: HOME | DRG: 693 | End: 2025-06-08
Attending: INTERNAL MEDICINE | Admitting: INTERNAL MEDICINE
Payer: COMMERCIAL

## 2025-06-07 VITALS
HEART RATE: 69 BPM | SYSTOLIC BLOOD PRESSURE: 122 MMHG | TEMPERATURE: 99 F | RESPIRATION RATE: 16 BRPM | OXYGEN SATURATION: 97 % | DIASTOLIC BLOOD PRESSURE: 79 MMHG

## 2025-06-07 VITALS
OXYGEN SATURATION: 98 % | WEIGHT: 199.96 LBS | DIASTOLIC BLOOD PRESSURE: 83 MMHG | SYSTOLIC BLOOD PRESSURE: 124 MMHG | TEMPERATURE: 98 F | RESPIRATION RATE: 19 BRPM | HEART RATE: 79 BPM | HEIGHT: 73 IN

## 2025-06-07 VITALS
OXYGEN SATURATION: 98 % | HEIGHT: 73 IN | RESPIRATION RATE: 16 BRPM | WEIGHT: 199.96 LBS | SYSTOLIC BLOOD PRESSURE: 130 MMHG | DIASTOLIC BLOOD PRESSURE: 91 MMHG | HEART RATE: 79 BPM | TEMPERATURE: 98 F

## 2025-06-07 DIAGNOSIS — N20.0 CALCULUS OF KIDNEY: ICD-10-CM

## 2025-06-07 DIAGNOSIS — N13.9 OBSTRUCTIVE AND REFLUX UROPATHY, UNSPECIFIED: ICD-10-CM

## 2025-06-07 DIAGNOSIS — N17.9 ACUTE KIDNEY FAILURE, UNSPECIFIED: ICD-10-CM

## 2025-06-07 DIAGNOSIS — K08.409 PARTIAL LOSS OF TEETH, UNSPECIFIED CAUSE, UNSPECIFIED CLASS: Chronic | ICD-10-CM

## 2025-06-07 DIAGNOSIS — K21.9 GASTRO-ESOPHAGEAL REFLUX DISEASE WITHOUT ESOPHAGITIS: ICD-10-CM

## 2025-06-07 DIAGNOSIS — I25.10 ATHEROSCLEROTIC HEART DISEASE OF NATIVE CORONARY ARTERY WITHOUT ANGINA PECTORIS: ICD-10-CM

## 2025-06-07 DIAGNOSIS — Z29.9 ENCOUNTER FOR PROPHYLACTIC MEASURES, UNSPECIFIED: ICD-10-CM

## 2025-06-07 DIAGNOSIS — F41.9 ANXIETY DISORDER, UNSPECIFIED: ICD-10-CM

## 2025-06-07 DIAGNOSIS — E78.5 HYPERLIPIDEMIA, UNSPECIFIED: ICD-10-CM

## 2025-06-07 DIAGNOSIS — R10.9 UNSPECIFIED ABDOMINAL PAIN: ICD-10-CM

## 2025-06-07 LAB
ALBUMIN SERPL ELPH-MCNC: 4 G/DL — SIGNIFICANT CHANGE UP (ref 3.3–5)
ALP SERPL-CCNC: 59 U/L — SIGNIFICANT CHANGE UP (ref 30–120)
ALT FLD-CCNC: 39 U/L — SIGNIFICANT CHANGE UP (ref 10–60)
ANION GAP SERPL CALC-SCNC: 7 MMOL/L — SIGNIFICANT CHANGE UP (ref 5–17)
APPEARANCE UR: CLEAR — SIGNIFICANT CHANGE UP
APPEARANCE UR: CLEAR — SIGNIFICANT CHANGE UP
APTT BLD: 38.7 SEC — HIGH (ref 26.1–36.8)
AST SERPL-CCNC: 26 U/L — SIGNIFICANT CHANGE UP (ref 10–40)
BASOPHILS # BLD AUTO: 0.03 K/UL — SIGNIFICANT CHANGE UP (ref 0–0.2)
BASOPHILS NFR BLD AUTO: 0.3 % — SIGNIFICANT CHANGE UP (ref 0–2)
BILIRUB SERPL-MCNC: 0.4 MG/DL — SIGNIFICANT CHANGE UP (ref 0.2–1.2)
BILIRUB UR-MCNC: NEGATIVE — SIGNIFICANT CHANGE UP
BILIRUB UR-MCNC: NEGATIVE — SIGNIFICANT CHANGE UP
BUN SERPL-MCNC: 20 MG/DL — SIGNIFICANT CHANGE UP (ref 7–23)
CALCIUM SERPL-MCNC: 9.5 MG/DL — SIGNIFICANT CHANGE UP (ref 8.4–10.5)
CHLORIDE SERPL-SCNC: 103 MMOL/L — SIGNIFICANT CHANGE UP (ref 96–108)
CO2 SERPL-SCNC: 31 MMOL/L — SIGNIFICANT CHANGE UP (ref 22–31)
COLOR SPEC: YELLOW — SIGNIFICANT CHANGE UP
COLOR SPEC: YELLOW — SIGNIFICANT CHANGE UP
CREAT SERPL-MCNC: 1.61 MG/DL — HIGH (ref 0.5–1.3)
DIFF PNL FLD: ABNORMAL
DIFF PNL FLD: ABNORMAL
EGFR: 54 ML/MIN/1.73M2 — LOW
EGFR: 54 ML/MIN/1.73M2 — LOW
EOSINOPHIL # BLD AUTO: 0.09 K/UL — SIGNIFICANT CHANGE UP (ref 0–0.5)
EOSINOPHIL NFR BLD AUTO: 0.9 % — SIGNIFICANT CHANGE UP (ref 0–6)
GLUCOSE SERPL-MCNC: 109 MG/DL — HIGH (ref 70–99)
GLUCOSE UR QL: NEGATIVE MG/DL — SIGNIFICANT CHANGE UP
GLUCOSE UR QL: NEGATIVE MG/DL — SIGNIFICANT CHANGE UP
HCT VFR BLD CALC: 36.3 % — LOW (ref 39–50)
HGB BLD-MCNC: 12 G/DL — LOW (ref 13–17)
IMM GRANULOCYTES NFR BLD AUTO: 0.2 % — SIGNIFICANT CHANGE UP (ref 0–0.9)
INR BLD: 1.17 RATIO — HIGH (ref 0.85–1.16)
KETONES UR QL: ABNORMAL MG/DL
KETONES UR QL: NEGATIVE MG/DL — SIGNIFICANT CHANGE UP
LEUKOCYTE ESTERASE UR-ACNC: NEGATIVE — SIGNIFICANT CHANGE UP
LEUKOCYTE ESTERASE UR-ACNC: NEGATIVE — SIGNIFICANT CHANGE UP
LYMPHOCYTES # BLD AUTO: 1.64 K/UL — SIGNIFICANT CHANGE UP (ref 1–3.3)
LYMPHOCYTES # BLD AUTO: 17.2 % — SIGNIFICANT CHANGE UP (ref 13–44)
MCHC RBC-ENTMCNC: 30.8 PG — SIGNIFICANT CHANGE UP (ref 27–34)
MCHC RBC-ENTMCNC: 33.1 G/DL — SIGNIFICANT CHANGE UP (ref 32–36)
MCV RBC AUTO: 93.3 FL — SIGNIFICANT CHANGE UP (ref 80–100)
MONOCYTES # BLD AUTO: 0.67 K/UL — SIGNIFICANT CHANGE UP (ref 0–0.9)
MONOCYTES NFR BLD AUTO: 7 % — SIGNIFICANT CHANGE UP (ref 2–14)
NEUTROPHILS # BLD AUTO: 7.1 K/UL — SIGNIFICANT CHANGE UP (ref 1.8–7.4)
NEUTROPHILS NFR BLD AUTO: 74.4 % — SIGNIFICANT CHANGE UP (ref 43–77)
NITRITE UR-MCNC: NEGATIVE — SIGNIFICANT CHANGE UP
NITRITE UR-MCNC: NEGATIVE — SIGNIFICANT CHANGE UP
NRBC BLD AUTO-RTO: 0 /100 WBCS — SIGNIFICANT CHANGE UP (ref 0–0)
PH UR: 7 — SIGNIFICANT CHANGE UP (ref 5–8)
PH UR: 7 — SIGNIFICANT CHANGE UP (ref 5–8)
PLATELET # BLD AUTO: 338 K/UL — SIGNIFICANT CHANGE UP (ref 150–400)
POTASSIUM SERPL-MCNC: 4.4 MMOL/L — SIGNIFICANT CHANGE UP (ref 3.5–5.3)
POTASSIUM SERPL-SCNC: 4.4 MMOL/L — SIGNIFICANT CHANGE UP (ref 3.5–5.3)
PROT SERPL-MCNC: 7.2 G/DL — SIGNIFICANT CHANGE UP (ref 6–8.3)
PROT UR-MCNC: NEGATIVE MG/DL — SIGNIFICANT CHANGE UP
PROT UR-MCNC: NEGATIVE MG/DL — SIGNIFICANT CHANGE UP
PROTHROM AB SERPL-ACNC: 13.6 SEC — HIGH (ref 9.9–13.4)
RBC # BLD: 3.89 M/UL — LOW (ref 4.2–5.8)
RBC # FLD: 11.4 % — SIGNIFICANT CHANGE UP (ref 10.3–14.5)
SODIUM SERPL-SCNC: 141 MMOL/L — SIGNIFICANT CHANGE UP (ref 135–145)
SP GR SPEC: 1.01 — SIGNIFICANT CHANGE UP (ref 1–1.03)
SP GR SPEC: 1.01 — SIGNIFICANT CHANGE UP (ref 1–1.03)
UROBILINOGEN FLD QL: 0.2 MG/DL — SIGNIFICANT CHANGE UP (ref 0.2–1)
UROBILINOGEN FLD QL: 0.2 MG/DL — SIGNIFICANT CHANGE UP (ref 0.2–1)
WBC # BLD: 9.55 K/UL — SIGNIFICANT CHANGE UP (ref 3.8–10.5)
WBC # FLD AUTO: 9.55 K/UL — SIGNIFICANT CHANGE UP (ref 3.8–10.5)

## 2025-06-07 PROCEDURE — 85610 PROTHROMBIN TIME: CPT

## 2025-06-07 PROCEDURE — 74176 CT ABD & PELVIS W/O CONTRAST: CPT | Mod: 26

## 2025-06-07 PROCEDURE — 96374 THER/PROPH/DIAG INJ IV PUSH: CPT

## 2025-06-07 PROCEDURE — 81001 URINALYSIS AUTO W/SCOPE: CPT

## 2025-06-07 PROCEDURE — 99285 EMERGENCY DEPT VISIT HI MDM: CPT

## 2025-06-07 PROCEDURE — 85730 THROMBOPLASTIN TIME PARTIAL: CPT

## 2025-06-07 PROCEDURE — 99285 EMERGENCY DEPT VISIT HI MDM: CPT | Mod: 25

## 2025-06-07 PROCEDURE — 80053 COMPREHEN METABOLIC PANEL: CPT

## 2025-06-07 PROCEDURE — 96376 TX/PRO/DX INJ SAME DRUG ADON: CPT

## 2025-06-07 PROCEDURE — 93010 ELECTROCARDIOGRAM REPORT: CPT

## 2025-06-07 PROCEDURE — 36415 COLL VENOUS BLD VENIPUNCTURE: CPT

## 2025-06-07 PROCEDURE — 85025 COMPLETE CBC W/AUTO DIFF WBC: CPT

## 2025-06-07 PROCEDURE — 74176 CT ABD & PELVIS W/O CONTRAST: CPT

## 2025-06-07 PROCEDURE — 99222 1ST HOSP IP/OBS MODERATE 55: CPT | Mod: GC

## 2025-06-07 PROCEDURE — 96375 TX/PRO/DX INJ NEW DRUG ADDON: CPT

## 2025-06-07 RX ORDER — HYDROMORPHONE/SOD CHLOR,ISO/PF 2 MG/10 ML
0.5 SYRINGE (ML) INJECTION EVERY 4 HOURS
Refills: 0 | Status: DISCONTINUED | OUTPATIENT
Start: 2025-06-07 | End: 2025-06-08

## 2025-06-07 RX ORDER — HYDROMORPHONE/SOD CHLOR,ISO/PF 2 MG/10 ML
0.5 SYRINGE (ML) INJECTION ONCE
Refills: 0 | Status: DISCONTINUED | OUTPATIENT
Start: 2025-06-07 | End: 2025-06-07

## 2025-06-07 RX ORDER — ONDANSETRON HCL/PF 4 MG/2 ML
4 VIAL (ML) INJECTION ONCE
Refills: 0 | Status: COMPLETED | OUTPATIENT
Start: 2025-06-07 | End: 2025-06-07

## 2025-06-07 RX ORDER — SENNA 187 MG
2 TABLET ORAL AT BEDTIME
Refills: 0 | Status: DISCONTINUED | OUTPATIENT
Start: 2025-06-07 | End: 2025-06-08

## 2025-06-07 RX ORDER — ROSUVASTATIN CALCIUM 20 MG/1
20 TABLET, FILM COATED ORAL AT BEDTIME
Refills: 0 | Status: DISCONTINUED | OUTPATIENT
Start: 2025-06-07 | End: 2025-06-08

## 2025-06-07 RX ORDER — TRAMADOL HYDROCHLORIDE 50 MG/1
50 TABLET, FILM COATED ORAL EVERY 6 HOURS
Refills: 0 | Status: DISCONTINUED | OUTPATIENT
Start: 2025-06-07 | End: 2025-06-08

## 2025-06-07 RX ORDER — ACETAMINOPHEN 500 MG/5ML
650 LIQUID (ML) ORAL EVERY 6 HOURS
Refills: 0 | Status: DISCONTINUED | OUTPATIENT
Start: 2025-06-07 | End: 2025-06-08

## 2025-06-07 RX ORDER — ESCITALOPRAM OXALATE 20 MG/1
10 TABLET ORAL DAILY
Refills: 0 | Status: DISCONTINUED | OUTPATIENT
Start: 2025-06-07 | End: 2025-06-08

## 2025-06-07 RX ORDER — MELATONIN 5 MG
3 TABLET ORAL AT BEDTIME
Refills: 0 | Status: DISCONTINUED | OUTPATIENT
Start: 2025-06-07 | End: 2025-06-08

## 2025-06-07 RX ADMIN — ESCITALOPRAM OXALATE 10 MILLIGRAM(S): 20 TABLET ORAL at 12:07

## 2025-06-07 RX ADMIN — Medication 1000 MILLILITER(S): at 05:50

## 2025-06-07 RX ADMIN — Medication 40 MILLIGRAM(S): at 12:07

## 2025-06-07 RX ADMIN — ROSUVASTATIN CALCIUM 20 MILLIGRAM(S): 20 TABLET, FILM COATED ORAL at 21:24

## 2025-06-07 RX ADMIN — Medication 75 MILLILITER(S): at 11:21

## 2025-06-07 RX ADMIN — Medication 100 MILLILITER(S): at 08:42

## 2025-06-07 RX ADMIN — Medication 2 MILLIGRAM(S): at 05:39

## 2025-06-07 RX ADMIN — Medication 4 MILLIGRAM(S): at 08:43

## 2025-06-07 RX ADMIN — Medication 0.5 MILLIGRAM(S): at 12:09

## 2025-06-07 RX ADMIN — Medication 0.5 MILLIGRAM(S): at 22:24

## 2025-06-07 RX ADMIN — Medication 4 MILLIGRAM(S): at 13:41

## 2025-06-07 RX ADMIN — Medication 2 TABLET(S): at 21:24

## 2025-06-07 RX ADMIN — Medication 0.5 MILLIGRAM(S): at 11:22

## 2025-06-07 RX ADMIN — Medication 4 MILLIGRAM(S): at 06:50

## 2025-06-07 RX ADMIN — Medication 0.5 MILLIGRAM(S): at 16:47

## 2025-06-07 RX ADMIN — Medication 1000 MILLILITER(S): at 05:05

## 2025-06-07 RX ADMIN — Medication 0.5 MILLIGRAM(S): at 21:24

## 2025-06-07 RX ADMIN — Medication 0.5 MILLIGRAM(S): at 08:42

## 2025-06-07 RX ADMIN — Medication 75 MILLILITER(S): at 17:39

## 2025-06-07 RX ADMIN — Medication 2 MILLIGRAM(S): at 06:45

## 2025-06-07 NOTE — H&P ADULT - NSHPREVIEWOFSYSTEMS_GEN_ALL_CORE
REVIEW OF SYSTEMS:  CONSTITUTIONAL: No fever   HEENT:  No headache  RESPIRATORY: No cough, or shortness of breath  CARDIOVASCULAR: No chest pain, palpitations  GASTROINTESTINAL: + vomiting 1x, denies constipation denies diarrhea  MUSCULOSKELETAL: no myalgias

## 2025-06-07 NOTE — ED ADULT NURSE NOTE - ED STAT RN HANDOFF DETAILS
pt aox4, no n/v, tolerating diet well, lt flank pain 3/10, tolerating diet well, voiding, specimen to lab, report given to Holding ERICA Helm

## 2025-06-07 NOTE — H&P ADULT - ASSESSMENT
40 year old male with CAD, HLD, Nephrolithiasis, GERD, and Anxiety, s/p lithotripsy in May of 2025 due to renal calculi presents to the ER with 1 day of hematuria with clots and pelvic pain. Patient said around 8am yesterday he began having blood in his urine, showed picture of urine with clots.

## 2025-06-07 NOTE — ED ADULT TRIAGE NOTE - CHIEF COMPLAINT QUOTE
L flank pain and clot clot to same.  Sent from [facility] for urology consult.  MAy 28th stenting done by Dr Isaac

## 2025-06-07 NOTE — H&P ADULT - NSHPPHYSICALEXAM_GEN_ALL_CORE
PHYSICAL EXAM:  GENERAL: NAD  HEENT:  sclera clear, anicteric, NC and NT  CHEST/LUNG:  CTA b/l, no rales, wheezes, or rhonchi  HEART:  RRR, S1, S2  ABDOMEN: + tender LLQ  EXTREMITIES: no edema  NERVOUS SYSTEM: answers questions and follows commands appropriately PHYSICAL EXAM:  GENERAL: NAD  HEENT:  sclera clear, anicteric, NC and NT  CHEST/LUNG:  CTA b/l, no rales, wheezes, or rhonchi  HEART:  RRR, S1, S2  ABDOMEN: + tender LLQ, no flank pain  EXTREMITIES: no edema  NERVOUS SYSTEM: answers questions and follows commands appropriately

## 2025-06-07 NOTE — H&P ADULT - PROBLEM SELECTOR PLAN 1
CT A/P showing obstructive uropathy with hematuria and potential clots in left renal pelvis  - Dilaudid 0.5mg IV q6  - Zofran for nausea, check qtc ecg ordered f/u   - Urology following  - remain on IVF for PRICILLA  - Serial abdominal exams per uro  - Pain control  - No longer taking home tamsulosin  - Monitor hematuria, resolved at this time CT A/P showing obstructive uropathy with hematuria and potential clots in left renal pelvis  - Dilaudid 0.5mg IV q6  - Zofran for nausea, check qtc ecg ordered f/u   - Urology following  - remain on IVF for PRICILLA  - Serial abdominal exams per uro  - Pain control, dilaudid 0.5mg IV q4 for severe pain  - No longer taking home tamsulosin  - Monitor hematuria, resolved at this time

## 2025-06-07 NOTE — ED PROVIDER NOTE - PROGRESS NOTE DETAILS
discussed results with pt, paging Dr. Richardson now, pt reports some improvement in pain but still there, requests further dose pain medication discussed results with pt, paging Dr. Richardson now, pt reports some improvement in pain but still there, requests further dose pain medication (creat today 1.6, in March was 0.9, Hb today 12, in March was 14.3) case d/w Dr. Richardson, will transfer to hospitals for admission, pain control, no emergent stent planned at this time; accepted for transfer to ED by Dr. Crespo and hospitalist aware pt aware transfer is arranged, appears uncomfortable, writhing on stretcher, will give additional dose of pain medication

## 2025-06-07 NOTE — ED ADULT NURSE NOTE - OBJECTIVE STATEMENT
pt to ED reports h/o kidney stones. had a stent placed and it was removed at [facility] on May 28th. pt reporting some hematuria at home; passed a "clot". now has left sided abd pain. denies n/v/d. afebrile

## 2025-06-07 NOTE — ED ADULT NURSE NOTE - OBJECTIVE STATEMENT
pt aox4, " transferred form [facility], renal colic, lt flank pain x 2 days " no sob, no n/v at this time

## 2025-06-07 NOTE — CONSULT NOTE ADULT - SUBJECTIVE AND OBJECTIVE BOX
Surgery Consultation    HPI    39yo M with PMHx of anxiety, GERD, HLD, CAD, L kidney calcifications s/p cystoscopy, with ureteral stricture dilation, stent placement (5/22/25, Dylan) with subsequent stent removal 5/29/25 l in the office 1 week ago presenting for L sided abd/flank pain with hematuria. Pt initially presented to [facility] and was transferred to [facility] for admission. Pt states he was asymptomatic after stent removal but developed L sided intermittent cramping. Cramping became more frequent and severe especially overnight prompting pt to come to ED for eval. Pt states he has had associated hematuria with some blood clots that has progressively improved and urine is not grossly hematuric now. Patient states he received pain medication prior to transfer over here and now his pain has been improved. Denies fevers, chills, dysuria, nausea/vomiting/diarrhea.         PAST MEDICAL & SURGICAL HISTORY:  CAD (coronary artery disease)      Hyperlipidemia      Anxiety      GERD (gastroesophageal reflux disease)      Nephrolithiasis      [place] teeth extracted        Allergies:  No Known Allergies    Home Medications:  Aspir 81 oral delayed release tablet: 1 tab(s) orally once a day (in the morning)  escitalopram 10 mg oral tablet: 1 tab(s) orally once a day (in the morning)  pantoprazole 40 mg oral delayed release tablet: 1 tab(s) orally once a day (in the morning)  rosuvastatin 20 mg oral tablet: 1 tab(s) orally once a day (at bedtime)      Family History:  FAMILY HISTORY:  FH: myocardial infarction (Mother)    FH: multiple sclerosis (Sibling)        ROS:  Constitutional: Denies fever, fatigue or weight loss.  Skin: Denies rash.  Eyes: Denies recent vision problems or eye pain.  ENT: Denies congestion, ear pain, or sore throat.  Endocrine: Denies thyroid problems.  Cardiovascular: Denies chest pain or palpation.  Respiratory: Denies cough, shortness of breath, congestion, or wheezing.  Gastrointestinal: Denies abdominal pain, nausea, vomiting, or diarrhea.  Genitourinary: SEE HPI  Musculoskeletal: Denies joint swelling.  Neurologic: Denies headache.      PHYSICAL EXAM:  GENERAL: No acute distress, well-developed  HEAD:  Atraumatic, Normocephalic  ABDOMEN: Soft, tender L abd and flank, non-distended  NEUROLOGY: responding appropriately, no focal deficits    Data:  T(C): 36.9 (06-07-25 @ 09:19), Max: 37.3 (06-07-25 @ 06:29)  HR: 79 (06-07-25 @ 09:19) (69 - 79)  BP: 124/83 (06-07-25 @ 09:19) (122/79 - 130/91)  RR: 19 (06-07-25 @ 09:19) (16 - 19)  SpO2: 98% (06-07-25 @ 09:19) (97% - 98%)                        12.0   9.55  )-----------( 338      ( 07 Jun 2025 05:23 )             36.3     06-07    141  |  103  |  20  ----------------------------<  109[H]  4.4   |  31  |  1.61[H]    Ca    9.5      07 Jun 2025 05:23    TPro  7.2  /  Alb  4.0  /  TBili  0.4  /  DBili  x   /  AST  26  /  ALT  39  /  AlkPhos  59  06-07      LIVER FUNCTIONS - ( 07 Jun 2025 05:23 )  Alb: 4.0 g/dL / Pro: 7.2 g/dL / ALK PHOS: 59 U/L / ALT: 39 U/L / AST: 26 U/L / GGT: x           Urinalysis Basic - ( 07 Jun 2025 05:23 )    Color: x / Appearance: x / SG: x / pH: x  Gluc: 109 mg/dL / Ketone: x  / Bili: x / Urobili: x   Blood: x / Protein: x / Nitrite: x   Leuk Esterase: x / RBC: x / WBC x   Sq Epi: x / Non Sq Epi: x / Bacteria: x    Radiology:  < from: CT Renal Stone Hunt (06.07.25 @ 05:50) >  FINDINGS:  LOWER CHEST: There is a multilobulated cystic structure of the right   epicardial space measuring 2.7 x 2.5 x 3.5 cm, stable compared to   previous exam. Dependent atelectasis is seen posteriorly. Calcified left   lower lobe granuloma.    LIVER: Within normal limits.  BILE DUCTS: Normal caliber.  GALLBLADDER: Within normal limits.  SPLEEN: Within normal limits.  PANCREAS: Within normal limits.  ADRENALS: Within normal limits.  KIDNEYS/URETERS: There is no right-sided urinary tract obstruction.   Punctate right renal stones are noted. There is left-sided hydronephrosis   and perinephric stranding. Increased density is seen within the left   lower pole calyces and renal pelvis. There is no obstructing calculus   identified. Multiple nonobstructing left-sided renal stones are noted,   the largest measuring up to 6 mm.    BLADDER: Mildly thick-walled.  REPRODUCTIVE ORGANS: Prostate gland is enlarged.    BOWEL: Stool air-filled colon which is distended. The appendix is normal.   There is no bowel obstruction.  PERITONEUM/RETROPERITONEUM: Within normal limits.  VESSELS: Within normal limits.  LYMPH NODES: No pathologically enlarged lymph nodes. Nonenlarged   retroperitoneal lymph nodes are noted..  ABDOMINAL WALL: There is diastasis recti.  BONES: Degenerative changes.    IMPRESSION:  Left-sided hyperdense material seen within left lower pole calyces on the   left renal pelvis, likely related to blood products/clot. There is   associated perinephric stranding and hydronephrosis suggesting associated   obstructive uropathy. Superimposed pyelonephritis/infection may appear   similar. Correlate clinically.    Nonobstructing bilateral renal stones.    Urinary bladder wall thickening which may represent sequelae of chronic   outlet obstruction from prostate enlargement or cystitis. Correlate   clinically.    Multiloculated cystic structure seen along the right epicardial space.   Differential considerations include an atypical pericardial cyst or   possibly lymphatic lesion. This appears relatively stable in size   compared to 2/27/2025. MRI may behelpful for more definitive   characterization.    Additional findings as above.    --- End of Report ---    AVERY MALDONADO M.D., Attending Radiologist  This document has been electronically signed. Jun 7 2025  6:21AM    < end of copied text >      Assessment:       39yo M with PMHx of anxiety, GERD, HLD, CAD, L kidney calcifications s/p cystoscopy, with ureteral stricture dilation, stent placement (5/22/25, Dylan) with subsequent stent removal 5/29/25 l in the office 1 week ago presenting with c/f L obstructive uropathy 2/2 suspected blood clot. PRICILLA with Cr 1.6 on admission.    Plan:  - Recc bladder scans PRN to determine volume/retention   - Serial abdominal exams   - Pain control  - Trend Cr    Surgical Team Contact Information  Spectralink: Ext: [phone] or [phone]   Surgery Consultation    HPI    41yo M with PMHx of anxiety, GERD, HLD, CAD, L kidney calcifications s/p cystoscopy, with ureteral stricture dilation, stent placement (5/22/25, Dylan) with subsequent stent removal 5/29/25 l in the office 1 week ago presenting for L sided abd/flank pain with hematuria. Pt initially presented to [facility] and was [facility]. Pt states he was asymptomatic after stent removal but developed L sided intermittent cramping. Cramping became more frequent and severe especially overnight prompting pt to come to ED for eval. Pt states he has had associated hematuria with some blood clots that has progressively improved and urine is not grossly hematuric now. Patient states he received pain medication prior to transfer over here and now his pain has been improved. Denies fevers, chills, dysuria, nausea/vomiting/diarrhea.         PAST MEDICAL & SURGICAL HISTORY:  CAD (coronary artery disease)      Hyperlipidemia      Anxiety      GERD (gastroesophageal reflux disease)      Nephrolithiasis      [place] teeth extracted        Allergies:  No Known Allergies    Home Medications:  Aspir 81 oral delayed release tablet: 1 tab(s) orally once a day (in the morning)  escitalopram 10 mg oral tablet: 1 tab(s) orally once a day (in the morning)  pantoprazole 40 mg oral delayed release tablet: 1 tab(s) orally once a day (in the morning)  rosuvastatin 20 mg oral tablet: 1 tab(s) orally once a day (at bedtime)      Family History:  FAMILY HISTORY:  FH: myocardial infarction (Mother)    FH: multiple sclerosis (Sibling)        ROS:  Constitutional: Denies fever, fatigue or weight loss.  Skin: Denies rash.  Eyes: Denies recent vision problems or eye pain.  ENT: Denies congestion, ear pain, or sore throat.  Endocrine: Denies thyroid problems.  Cardiovascular: Denies chest pain or palpation.  Respiratory: Denies cough, shortness of breath, congestion, or wheezing.  Gastrointestinal: Denies abdominal pain, nausea, vomiting, or diarrhea.  Genitourinary: SEE HPI  Musculoskeletal: Denies joint swelling.  Neurologic: Denies headache.      PHYSICAL EXAM:  GENERAL: No acute distress, well-developed  HEAD:  Atraumatic, Normocephalic  ABDOMEN: Soft, tender L abd and flank, non-distended  NEUROLOGY: responding appropriately, no focal deficits    Data:  T(C): 36.9 (06-07-25 @ 09:19), Max: 37.3 (06-07-25 @ 06:29)  HR: 79 (06-07-25 @ 09:19) (69 - 79)  BP: 124/83 (06-07-25 @ 09:19) (122/79 - 130/91)  RR: 19 (06-07-25 @ 09:19) (16 - 19)  SpO2: 98% (06-07-25 @ 09:19) (97% - 98%)                        12.0   9.55  )-----------( 338      ( 07 Jun 2025 05:23 )             36.3     06-07    141  |  103  |  20  ----------------------------<  109[H]  4.4   |  31  |  1.61[H]    Ca    9.5      07 Jun 2025 05:23    TPro  7.2  /  Alb  4.0  /  TBili  0.4  /  DBili  x   /  AST  26  /  ALT  39  /  AlkPhos  59  06-07      LIVER FUNCTIONS - ( 07 Jun 2025 05:23 )  Alb: 4.0 g/dL / Pro: 7.2 g/dL / ALK PHOS: 59 U/L / ALT: 39 U/L / AST: 26 U/L / GGT: x           Urinalysis Basic - ( 07 Jun 2025 05:23 )    Color: x / Appearance: x / SG: x / pH: x  Gluc: 109 mg/dL / Ketone: x  / Bili: x / Urobili: x   Blood: x / Protein: x / Nitrite: x   Leuk Esterase: x / RBC: x / WBC x   Sq Epi: x / Non Sq Epi: x / Bacteria: x    Radiology:  < from: CT Renal Stone Hunt (06.07.25 @ 05:50) >  FINDINGS:  LOWER CHEST: There is a multilobulated cystic structure of the right   epicardial space measuring 2.7 x 2.5 x 3.5 cm, stable compared to   previous exam. Dependent atelectasis is seen posteriorly. Calcified left   lower lobe granuloma.    LIVER: Within normal limits.  BILE DUCTS: Normal caliber.  GALLBLADDER: Within normal limits.  SPLEEN: Within normal limits.  PANCREAS: Within normal limits.  ADRENALS: Within normal limits.  KIDNEYS/URETERS: There is no right-sided urinary tract obstruction.   Punctate right renal stones are noted. There is left-sided hydronephrosis   and perinephric stranding. Increased density is seen within the left   lower pole calyces and renal pelvis. There is no obstructing calculus   identified. Multiple nonobstructing left-sided renal stones are noted,   the largest measuring up to 6 mm.    BLADDER: Mildly thick-walled.  REPRODUCTIVE ORGANS: Prostate gland is enlarged.    BOWEL: Stool air-filled colon which is distended. The appendix is normal.   There is no bowel obstruction.  PERITONEUM/RETROPERITONEUM: Within normal limits.  VESSELS: Within normal limits.  LYMPH NODES: No pathologically enlarged lymph nodes. Nonenlarged   retroperitoneal lymph nodes are noted..  ABDOMINAL WALL: There is diastasis recti.  BONES: Degenerative changes.    IMPRESSION:  Left-sided hyperdense material seen within left lower pole calyces on the   left renal pelvis, likely related to blood products/clot. There is   associated perinephric stranding and hydronephrosis suggesting associated   obstructive uropathy. Superimposed pyelonephritis/infection may appear   similar. Correlate clinically.    Nonobstructing bilateral renal stones.    Urinary bladder wall thickening which may represent sequelae of chronic   outlet obstruction from prostate enlargement or cystitis. Correlate   clinically.    Multiloculated cystic structure seen along the right epicardial space.   Differential considerations include an atypical pericardial cyst or   possibly lymphatic lesion. This appears relatively stable in size   compared to 2/27/2025. MRI may behelpful for more definitive   characterization.    Additional findings as above.    --- End of Report ---    AVERY MALDONADO M.D., Attending Radiologist  This document has been electronically signed. Jun 7 2025  6:21AM    < end of copied text >      Assessment:       41yo M with PMHx of anxiety, GERD, HLD, CAD, L kidney calcifications s/p cystoscopy, with ureteral stricture dilation, stent placement (5/22/25, Dylan) with subsequent stent removal 5/29/25 l in the office 1 week ago presenting with c/f L obstructive uropathy 2/2 suspected blood clot. PRICILLA with Cr 1.6 on admission.    Plan:  - Recc bladder scans PRN to determine volume/retention   - Serial abdominal exams   - Pain control  - Trend Cr  - Pt can have reg diet, but should remain on IVF for PRICILLA    Surgical Team Contact Information  Spectralink: Ext: [phone] or [phone]

## 2025-06-07 NOTE — H&P ADULT - PROBLEM SELECTOR PLAN 2
PRICILLA in the setting of potential renal obstruction  - Baseline creatinine ~1.0, now 1.6  - IVF  - Monitor metabolic panel  - Avoid nephrotoxic medications  - Monitor renal indices  - Consider nephrology consult for worsening renal function

## 2025-06-07 NOTE — ED PROVIDER NOTE - CARE PLAN
Principal Discharge DX:	Renal hematuria   1 Principal Discharge DX:	Renal hematuria  Secondary Diagnosis:	PRICILLA (acute kidney injury)

## 2025-06-07 NOTE — ED PROVIDER NOTE - PHYSICAL EXAMINATION
Constitutional: Awake, Alert, non-toxic. No acute distress.  HEAD: Normocephalic, atraumatic.   EYES: PERRL, EOM intact, conjunctiva and sclera are clear bilaterally.  ENT: External ears normal. No rhinorrhea, no tracheal deviation   NECK: Supple, non-tender  CARDIOVASCULAR: regular rate and rhythm.  RESPIRATORY: Normal respiratory effort; breath sounds CTAB, no wheezes or rales. Speaking in full sentences. No accessory muscle use.   ABDOMEN: Soft; LLQ TTP, non-distended. No rebound or guarding.   MSK:  no lower extremity edema, no deformities  SKIN: Warm, dry  NEURO: A&O x3. Sensory and motor functions are grossly intact. Speech is normal. No facial droop.

## 2025-06-07 NOTE — H&P ADULT - CLICK TO LAUNCH ORM
-stable, no s/s of respiratory distress  -continue home regimen of Pulmicort  -Duo-nebs PRN for SOB/wheezing  -supplemental oxygen SpO2 <90%     .

## 2025-06-07 NOTE — ED PROVIDER NOTE - CLINICAL SUMMARY MEDICAL DECISION MAKING FREE TEXT BOX
Patient with concern for findings as above from outside hospital.  Pain currently controlled.  Surgical PA was consulted on patient, will see patient.  Discussed case with Dr. Webber hospitalist who will admit patient for pain management for his post procedural stent removal pain and hydronephrosis.  Patient was also found to have elevated renal function. patient has still been able to void.

## 2025-06-07 NOTE — ED PROVIDER NOTE - OBJECTIVE STATEMENT
40y M c/o left flank/pelvic pain, pt has h/o renal stones, left side 1cm and 0.6cm stones, went for lithotripsy with Dr. Richardson on 5/22, states the stones were walled off and couldn't be broken down, but a stent was placed temporarily for a narrowing of the ureter, pt had stent removed 5/29, believes the narrowing was improved, denies any UTI, had some bleeding initially and cramping but was well this past week until yesterday, developed left flank/pelvic pain/cramping, passed large blood clot and has had some hematuria, able to urinate this morning without hesitation, but the pain continues, has not taken anything for pain, no fever/chills, no n/v, no change in BM

## 2025-06-07 NOTE — H&P ADULT - ATTENDING COMMENTS
40 year old male with CAD, HLD, Nephrolithiasis, GERD, and Anxiety, s/p lithotripsy in May of 2025 due to renal calculi presents to the ER with 1 day of hematuria with clots and pelvic pain. Patient said around 8am yesterday he began having blood in his urine, showed picture of urine with clots.    Care discussed with urology Dr. Barnes, and pt planned to be observed for further hematuria episodes, monitor Hb on CBC, pain control with IV dilaudid 0.5 mg q6 as needed, IVF.    Shaheed Webber, Attending Physician

## 2025-06-07 NOTE — H&P ADULT - HISTORY OF PRESENT ILLNESS
40 year old male with CAD, HLD, Nephrolithiasis, GERD, and Anxiety, s/p lithotripsy in May of 2025 due to renal calculi presents to the ER with 1 day of hematuria with clots and pelvic pain. Patient said around 8am yesterday he began having blood in his urine, showed picture of urine with clots. This has never happened before. Patient said 7/10 pain in RLQ began as well around then that was constant. He urinated throughout the day and the urine gradually cleared up but the pain did not resolve. The patient went to [facility] where a CT scan was performed and he was given pain medication which helped and improved it to 3/10. Patient was given dilaudid here and now his pain is back to 5/10. Patient urine looks normal at this time, yellow urine visualized in urinal at bedside. Patient denies fever, chills. Patient vomited once from nausea, appears to be 2/2 pain. Patient had lithotripsy 2 weeks ago, stones were not removed because urologist thought they would not be a future issue due to location. Stent was placed and removed in office a week ago. After removal patient stopped taking tamsulosin. Patient states he does not have discomfort with urination it is only in abdomen. Denies flank pain. Pain does not radiate. Patient took oxycodone for 3 days after his lithotripsy, no other medications taken on his own for abdominal pain until coming into the ER and receiving dilaudid. Patient states he has some palpitations occasionally, not new. Patient urologist is Dr. Nieto and had his procedure done here at [place]. Of note, patient also has history of Factor IX deficiency clotting factor. He has no history of blood clots. States his aPTT is elevated at times. He visited a [facility] hematologist prior to his procedure due to his urologist recommendations.    Denies chest pain, SOB, cough,  diarrhea, constipation, urinary frequency.      ED Course:   Given dilaudid 1x  Transfer from [place]  CT A/P showed    Left-sided hyperdense material seen within left lower pole calyces on the   left renal pelvis, likely related to blood products/clot. There is   associated perinephric stranding and hydronephrosis suggesting associated   obstructive uropathy. Superimposed pyelonephritis/infection may appear   similar. Correlate clinically.    Nonobstructing bilateral renal stones.    Urinary bladder wall thickening which may represent sequelae of chronic   outlet obstruction from prostate enlargement or cystitis. Correlate   clinically.    Multiloculated cystic structure seen along the right epicardial space.   Differential considerations include an atypical pericardial cyst or   possibly lymphatic lesion. This appears relatively stable in size   compared to 2/27/2025. MRI may be helpful for more definitive   characterization.  Vitals: BP: , HR: , Temp: , RR: , SpO2: % on   Labs:    ABG: pH: , PO2: , PCO2: , HCO3: , SpO2: %  UA:   CXR: as per personal read, official read pending   CT:  EKG:   Received in the ED    40 year old male with CAD, HLD, Nephrolithiasis, GERD, and Anxiety, s/p lithotripsy in May of 2025 due to renal calculi presents to the ER with 1 day of hematuria with clots and pelvic pain. Patient said around 8am yesterday he began having blood in his urine, showed picture of urine with clots. This has never happened before. Patient said 7/10 pain in RLQ began as well around then that was constant. He urinated throughout the day and the urine gradually cleared up but the pain did not resolve. The patient went to [facility] where a CT scan was performed and he was given pain medication which helped and improved it to 3/10. Patient was given dilaudid here and now his pain is back to 5/10. Patient urine looks normal at this time, yellow urine visualized in urinal at bedside. Patient denies fever, chills. Patient vomited once from nausea, appears to be 2/2 pain. Patient had lithotripsy 2 weeks ago, stones were not removed because urologist thought they would not be a future issue due to location. Stent was placed and removed in office a week ago. After removal patient stopped taking tamsulosin. Patient states he does not have discomfort with urination it is only in abdomen. Denies flank pain. Pain does not radiate. Patient took oxycodone for 3 days after his lithotripsy, no other medications taken on his own for abdominal pain until coming into the ER and receiving dilaudid. Patient states he has some palpitations occasionally, not new. Patient urologist is Dr. iNeto and had his procedure done here at [place]. Of note, patient also has history of Factor IX deficiency clotting factor. He has no history of blood clots. States his aPTT is elevated at times. He visited a [facility] hematologist prior to his procedure due to his urologist recommendations. Of note, patient's pain began in the right lower quadrant but has been migrating towards the umbilicus now.     Denies chest pain, SOB, cough,  diarrhea, constipation, urinary frequency.      ED Course:   Given dilaudid 1x  Transfer from [place]  CT A/P showed    Left-sided hyperdense material seen within left lower pole calyces on the   left renal pelvis, likely related to blood products/clot. There is   associated perinephric stranding and hydronephrosis suggesting associated   obstructive uropathy. Superimposed pyelonephritis/infection may appear   similar. Correlate clinically.    Nonobstructing bilateral renal stones.    Urinary bladder wall thickening which may represent sequelae of chronic   outlet obstruction from prostate enlargement or cystitis. Correlate   clinically.    Multiloculated cystic structure seen along the right epicardial space.   Differential considerations include an atypical pericardial cyst or   possibly lymphatic lesion. This appears relatively stable in size   compared to 2/27/2025. MRI may be helpful for more definitive   characterization.  Vitals: BP: , HR: , Temp: , RR: , SpO2: % on   Labs:    ABG: pH: , PO2: , PCO2: , HCO3: , SpO2: %  UA:   CXR: as per personal read, official read pending   CT:  EKG:   Received in the ED

## 2025-06-07 NOTE — ED PROVIDER NOTE - CLINICAL SUMMARY MEDICAL DECISION MAKING FREE TEXT BOX
40y M s/p ureteral stent placement and removal last month, known renal stones, now since yesterday hematuria/clots and flank/left pelvic pain - iv, labs, ct stone boothe, will d/w pt's  (Dr. Richardson)

## 2025-06-07 NOTE — ED ADULT TRIAGE NOTE - CHIEF COMPLAINT QUOTE
stent remove May 28, 2025; hematuria; abdominal pain today stent remove May 28, 2025 at [facility]; pt having hematuria; abdominal pain today

## 2025-06-07 NOTE — ED ADULT NURSE NOTE - CHIEF COMPLAINT QUOTE
L flank pain and clot clot to same.  Sent from [facility] for urology consult.  MAy 26th stenting done by Dr Isaac

## 2025-06-07 NOTE — H&P ADULT - PROBLEM SELECTOR PLAN 3
-Continue home rosuvastatin 20mg  -Hold aspirin in setting of hematuria  -BP Control  -DASH/TLC  -Keep K>4.0, Mg>2.0

## 2025-06-07 NOTE — ED PROVIDER NOTE - OBJECTIVE STATEMENT
Patient with a past medical history of renal stones status post lithotripsy and stenting with stent removal about 1 week ago presented to outside hospital ER for left-sided flank and pelvic pain with hematuria.  States the pain has been present for about 24 hours.  While there he had imaging done that showed concern for blood products in the kidney as well as some hydronephrosis.  The patient was given pain medication, [facility] urology consulted who requested transfer to [facility] for admission and pain control.  Patient states he received pain medication prior to transfer over here and now his pain has been improved.

## 2025-06-07 NOTE — H&P ADULT - PROBLEM/PLAN-7
Assessment     37 y o , [id number] presenting for consents for bilateral salpingectomy/tubal ligation      Plan  Patient signed MA-31 for permanent sterilization on 20  Consents signed today for bilateral salpingectomy/tubali ligation with all questions answered  Return to office for H&P after surgery approved and scheduled      Subjective      Lucina Lai Juanpablo is a 37 y o  female who presents for consent signing for permanent sterilization  Patient was previously counseled on birth control options and declined OCP's, nuva-ring, transdermal patch, depo-provera, Nexplanon and IUD  Patient understands permanent sterilization is an irreversible procedure and would like to proceed  She is aware she may change her mind at any point prior to the procedure  Menstrual History:  OB History        5    Para   4    Term   3       1    AB   1    Living   4       SAB   1    TAB   0    Ectopic   0    Multiple   0    Live Births   4                   Review of Systems  Pertinent items are noted in HPI  Objective      There were no vitals taken for this visit 
DISPLAY PLAN FREE TEXT

## 2025-06-07 NOTE — ED ADULT NURSE NOTE - NSFALLUNIVINTERV_ED_ALL_ED
Bed/Stretcher in lowest position, wheels locked, appropriate side rails in place/Call bell, personal items and telephone in reach/Instruct patient to call for assistance before getting out of bed/chair/stretcher/Non-slip footwear applied when patient is off stretcher/[place] to call system/Physically safe environment - no spills, clutter or unnecessary equipment/Purposeful proactive rounding/Room/bathroom lighting operational, light cord in reach

## 2025-06-07 NOTE — H&P ADULT - NSHPSOCIALHISTORY_GEN_ALL_CORE
No smoking history, no alcohol use, lives with wife and 3 kids. ambulates independently. chill [place]

## 2025-06-08 VITALS
RESPIRATION RATE: 18 BRPM | OXYGEN SATURATION: 94 % | TEMPERATURE: 98 F | HEART RATE: 75 BPM | WEIGHT: 202.83 LBS | SYSTOLIC BLOOD PRESSURE: 116 MMHG | DIASTOLIC BLOOD PRESSURE: 67 MMHG

## 2025-06-08 LAB
A1C WITH ESTIMATED AVERAGE GLUCOSE RESULT: 5.4 % — SIGNIFICANT CHANGE UP (ref 4–5.6)
ALBUMIN SERPL ELPH-MCNC: 3.1 G/DL — LOW (ref 3.3–5)
ALP SERPL-CCNC: 52 U/L — SIGNIFICANT CHANGE UP (ref 40–120)
ALT FLD-CCNC: 24 U/L — SIGNIFICANT CHANGE UP (ref 12–78)
ANION GAP SERPL CALC-SCNC: 5 MMOL/L — SIGNIFICANT CHANGE UP (ref 5–17)
AST SERPL-CCNC: 15 U/L — SIGNIFICANT CHANGE UP (ref 15–37)
BASOPHILS # BLD AUTO: 0.03 K/UL — SIGNIFICANT CHANGE UP (ref 0–0.2)
BASOPHILS NFR BLD AUTO: 0.3 % — SIGNIFICANT CHANGE UP (ref 0–2)
BILIRUB SERPL-MCNC: 0.7 MG/DL — SIGNIFICANT CHANGE UP (ref 0.2–1.2)
BUN SERPL-MCNC: 11 MG/DL — SIGNIFICANT CHANGE UP (ref 7–23)
CALCIUM SERPL-MCNC: 8.6 MG/DL — SIGNIFICANT CHANGE UP (ref 8.5–10.1)
CHLORIDE SERPL-SCNC: 106 MMOL/L — SIGNIFICANT CHANGE UP (ref 96–108)
CHOLEST SERPL-MCNC: 100 MG/DL — SIGNIFICANT CHANGE UP
CO2 SERPL-SCNC: 29 MMOL/L — SIGNIFICANT CHANGE UP (ref 22–31)
CREAT SERPL-MCNC: 1.1 MG/DL — SIGNIFICANT CHANGE UP (ref 0.5–1.3)
EGFR: 86 ML/MIN/1.73M2 — SIGNIFICANT CHANGE UP
EGFR: 86 ML/MIN/1.73M2 — SIGNIFICANT CHANGE UP
EOSINOPHIL # BLD AUTO: 0.06 K/UL — SIGNIFICANT CHANGE UP (ref 0–0.5)
EOSINOPHIL NFR BLD AUTO: 0.6 % — SIGNIFICANT CHANGE UP (ref 0–6)
ESTIMATED AVERAGE GLUCOSE: 108 MG/DL — SIGNIFICANT CHANGE UP (ref 68–114)
GLUCOSE SERPL-MCNC: 88 MG/DL — SIGNIFICANT CHANGE UP (ref 70–99)
HCT VFR BLD CALC: 35.7 % — LOW (ref 39–50)
HDLC SERPL-MCNC: 30 MG/DL — LOW
HGB BLD-MCNC: 11.7 G/DL — LOW (ref 13–17)
IMM GRANULOCYTES NFR BLD AUTO: 0.3 % — SIGNIFICANT CHANGE UP (ref 0–0.9)
LDLC SERPL-MCNC: 57 MG/DL — SIGNIFICANT CHANGE UP
LIPID PNL WITH DIRECT LDL SERPL: 57 MG/DL — SIGNIFICANT CHANGE UP
LYMPHOCYTES # BLD AUTO: 1.81 K/UL — SIGNIFICANT CHANGE UP (ref 1–3.3)
LYMPHOCYTES # BLD AUTO: 19.2 % — SIGNIFICANT CHANGE UP (ref 13–44)
MCHC RBC-ENTMCNC: 31 PG — SIGNIFICANT CHANGE UP (ref 27–34)
MCHC RBC-ENTMCNC: 32.8 G/DL — SIGNIFICANT CHANGE UP (ref 32–36)
MCV RBC AUTO: 94.4 FL — SIGNIFICANT CHANGE UP (ref 80–100)
MONOCYTES # BLD AUTO: 0.78 K/UL — SIGNIFICANT CHANGE UP (ref 0–0.9)
MONOCYTES NFR BLD AUTO: 8.3 % — SIGNIFICANT CHANGE UP (ref 2–14)
NEUTROPHILS # BLD AUTO: 6.73 K/UL — SIGNIFICANT CHANGE UP (ref 1.8–7.4)
NEUTROPHILS NFR BLD AUTO: 71.3 % — SIGNIFICANT CHANGE UP (ref 43–77)
NONHDLC SERPL-MCNC: 71 MG/DL — SIGNIFICANT CHANGE UP
NRBC BLD AUTO-RTO: 0 /100 WBCS — SIGNIFICANT CHANGE UP (ref 0–0)
PLATELET # BLD AUTO: 312 K/UL — SIGNIFICANT CHANGE UP (ref 150–400)
POTASSIUM SERPL-MCNC: 4.3 MMOL/L — SIGNIFICANT CHANGE UP (ref 3.5–5.3)
POTASSIUM SERPL-SCNC: 4.3 MMOL/L — SIGNIFICANT CHANGE UP (ref 3.5–5.3)
PROT SERPL-MCNC: 6.5 G/DL — SIGNIFICANT CHANGE UP (ref 6–8.3)
RBC # BLD: 3.78 M/UL — LOW (ref 4.2–5.8)
RBC # FLD: 11.9 % — SIGNIFICANT CHANGE UP (ref 10.3–14.5)
SODIUM SERPL-SCNC: 140 MMOL/L — SIGNIFICANT CHANGE UP (ref 135–145)
TRIGL SERPL-MCNC: 58 MG/DL — SIGNIFICANT CHANGE UP
WBC # BLD: 9.44 K/UL — SIGNIFICANT CHANGE UP (ref 3.8–10.5)
WBC # FLD AUTO: 9.44 K/UL — SIGNIFICANT CHANGE UP (ref 3.8–10.5)

## 2025-06-08 PROCEDURE — 81001 URINALYSIS AUTO W/SCOPE: CPT

## 2025-06-08 PROCEDURE — 80061 LIPID PANEL: CPT

## 2025-06-08 PROCEDURE — 36415 COLL VENOUS BLD VENIPUNCTURE: CPT

## 2025-06-08 PROCEDURE — 85025 COMPLETE CBC W/AUTO DIFF WBC: CPT

## 2025-06-08 PROCEDURE — 99239 HOSP IP/OBS DSCHRG MGMT >30: CPT

## 2025-06-08 PROCEDURE — 99285 EMERGENCY DEPT VISIT HI MDM: CPT

## 2025-06-08 PROCEDURE — 83036 HEMOGLOBIN GLYCOSYLATED A1C: CPT

## 2025-06-08 PROCEDURE — 93005 ELECTROCARDIOGRAM TRACING: CPT

## 2025-06-08 PROCEDURE — 96375 TX/PRO/DX INJ NEW DRUG ADDON: CPT

## 2025-06-08 PROCEDURE — 96374 THER/PROPH/DIAG INJ IV PUSH: CPT

## 2025-06-08 PROCEDURE — 80053 COMPREHEN METABOLIC PANEL: CPT

## 2025-06-08 RX ADMIN — Medication 75 MILLILITER(S): at 07:39

## 2025-06-08 RX ADMIN — Medication 40 MILLIGRAM(S): at 12:19

## 2025-06-08 RX ADMIN — ESCITALOPRAM OXALATE 10 MILLIGRAM(S): 20 TABLET ORAL at 12:19

## 2025-06-08 NOTE — DISCHARGE NOTE NURSING/CASE MANAGEMENT/SOCIAL WORK - NSDCPETBCESMAN_GEN_ALL_CORE
Pt rescheduled procedure for 3/02/2023 w/ Dr Glenys Rocha 
If you are a smoker, it is important for your health to stop smoking. Please be aware that second hand smoke is also harmful.

## 2025-06-08 NOTE — PROGRESS NOTE ADULT - SUBJECTIVE AND OBJECTIVE BOX
S: Patient seen and examined at bedside. Overnight with episodes of bloody urine and clot evacuation, with resolution of pain.  Patient reports improvement in abdominal pain now. Ambulating and tolerating diet. Patient denies any fever, chills, chest pain, shortness of breath, nausea, vomiting.    MEDICATIONS:  acetaminophen     Tablet .. 650 milliGRAM(s) Oral every 6 hours PRN  escitalopram 10 milliGRAM(s) Oral daily  HYDROmorphone  Injectable 0.5 milliGRAM(s) IV Push every 4 hours PRN  melatonin 3 milliGRAM(s) Oral at bedtime PRN  pantoprazole  Injectable 40 milliGRAM(s) IV Push daily  rosuvastatin 20 milliGRAM(s) Oral at bedtime  senna 2 Tablet(s) Oral at bedtime  sodium chloride 0.9%. 1000 milliLiter(s) IV Continuous <Continuous>  traMADol 50 milliGRAM(s) Oral every 6 hours PRN      O:  Vital Signs Last 24 Hrs  T(C): 36.8 (08 Jun 2025 05:41), Max: 37.3 (07 Jun 2025 06:29)  T(F): 98.3 (08 Jun 2025 05:41), Max: 99.1 (07 Jun 2025 06:29)  HR: 75 (08 Jun 2025 05:41) (63 - 80)  BP: 116/67 (08 Jun 2025 05:41) (113/70 - 136/80)  BP(mean): 97 (07 Jun 2025 16:45) (97 - 97)  RR: 18 (08 Jun 2025 05:41) (16 - 19)  SpO2: 94% (08 Jun 2025 05:41) (92% - 98%)    Parameters below as of 08 Jun 2025 05:41  Patient On (Oxygen Delivery Method): room air        PHYSICAL EXAM:  GENERAL: No acute distress, lying comfortably in bed  HEAD:  Atraumatic, Normocephalic  CHEST/LUNG: Non labored respirations, no accessory muscle use.   HEART: Regular rate and rhythm; No murmurs, rubs, or gallops  ABDOMEN: Soft, non-tender, non-distended; no palpable bladder, no suprapubic tenderness  EXT: calves non-tender b/l, no edema  NEUROLOGY: A&O x 3, no focal deficits    I&O SUMMARY:    06-07-25 @ 07:01  -  06-08-25 @ 06:23  --------------------------------------------------------  IN:    sodium chloride 0.9%: 450 mL  Total IN: 450 mL    OUT:    Voided (mL): 250 mL  Total OUT: 250 mL    Total NET: 200 mL          LABS:   AM Labs pending    RADIOLOGY:  < from: CT Renal Stone Hunt (06.07.25 @ 05:50) >  IMPRESSION:  Left-sided hyperdense material seen within left lower pole calyces on the   left renal pelvis, likely related to blood products/clot. There is   associated perinephric stranding and hydronephrosis suggesting associated   obstructive uropathy. Superimposed pyelonephritis/infection may appear   similar. Correlate clinically.    Nonobstructing bilateral renal stones.    Urinary bladder wall thickening which may represent sequelae of chronic   outlet obstruction from prostate enlargement or cystitis. Correlate   clinically.    Multiloculated cystic structure seen along the right epicardial space.   Differential considerations include an atypical pericardial cyst or   possibly lymphatic lesion. This appears relatively stable in size   compared to 2/27/2025. MRI may behelpful for more definitive   characterization.    Additional findings as above.    --- End of Report ---    < end of copied text >    ASSESSMENT: 39yo M with PMHx of anxiety, GERD, HLD, CAD, L kidney calcifications s/p cystoscopy, with ureteral stricture dilation, stent placement (5/22/25, Dylan) with subsequent stent removal 5/29/25 in the office 1 week ago presenting with c/f L obstructive uropathy 2/2 suspected blood clot. PRICILLA with Cr 1.6 on admission.     PLAN:  - Pending AM labs, will follow Cr  - Continue diet  - Continue IVF  - Rest of care per primary    To be discussed with Dr. Richardson.    Surgical Team Spectralink: [id number]

## 2025-06-08 NOTE — DISCHARGE NOTE NURSING/CASE MANAGEMENT/SOCIAL WORK - NSDCPEFALRISK_GEN_ALL_CORE
For information on Fall & Injury Prevention, visit: [email or web address] OR  [email or web address] OR  https://www.cdc.gov/steadi/patient.html

## 2025-06-08 NOTE — DISCHARGE NOTE PROVIDER - NSDCCPCAREPLAN_GEN_ALL_CORE_FT
PRINCIPAL DISCHARGE DIAGNOSIS  Diagnosis: Obstructive uropathy  Assessment and Plan of Treatment: CT :  Left-sided hyperdense material seen within left lower pole calyces on the left renal pelvis, likely related to blood products/clot. There is associated perinephric stranding and hydronephrosis suggesting associated obstructive uropathy. Superimposed pyelonephritis/infection may appear similar.Nonobstructing bilateral renal stones.  Urinary bladder wall thickening which may represent sequelae of chronic  outlet obstruction from prostate enlargement or cystitis. Correlate   clinically.  Multiloculated cystic structure seen along the right epicardial space.   Differential considerations include an atypical pericardial cyst or   possibly lymphatic lesion. This appears relatively stable in size   you were seen by urology, received IV fluid. renal function improved.   symptoms improved. follow up urology out patient.   compared to 2/27/2025.      SECONDARY DISCHARGE DIAGNOSES  Diagnosis: Hematuria  Assessment and Plan of Treatment: resolved. hold ASA for one week,  follow up urology in one week    Diagnosis: Renal stone  Assessment and Plan of Treatment: non obstructing stone on CT scan, follow up urology.    Diagnosis: PRICILLA (acute kidney injury)  Assessment and Plan of Treatment: due to obstructive uropathy: resolved. oral hydration encouraged.    Diagnosis: Factor IX deficiency  Assessment and Plan of Treatment: follow up hematology as scheduled tomorrow

## 2025-06-08 NOTE — DISCHARGE NOTE NURSING/CASE MANAGEMENT/SOCIAL WORK - FINANCIAL ASSISTANCE
[facility] provides services at a reduced cost to those who are determined to be eligible through [facility]’s financial assistance program. Information regarding [facility]’s financial assistance program can be found by going to [email or web address] or by calling [phone].

## 2025-06-08 NOTE — DISCHARGE NOTE PROVIDER - PROVIDER TOKENS
PROVIDER:[TOKEN:[[id number]],FOLLOWUP:[1 week]],PROVIDER:[TOKEN:[[id number]],FOLLOWUP:[1 week]]

## 2025-06-08 NOTE — DISCHARGE NOTE PROVIDER - NSDCMRMEDTOKEN_GEN_ALL_CORE_FT
escitalopram 10 mg oral tablet: 1 tab(s) orally once a day (in the morning)  pantoprazole 40 mg oral delayed release tablet: 1 tab(s) orally once a day (in the morning)  rosuvastatin 20 mg oral tablet: 1 tab(s) orally once a day (at bedtime)

## 2025-06-08 NOTE — DISCHARGE NOTE PROVIDER - NSDCFUSCHEDAPPT_GEN_ALL_CORE_FT
Amina Fine  [facility]  Shaina Moreno  Scheduled Appointment: 06/09/2025

## 2025-06-08 NOTE — DISCHARGE NOTE NURSING/CASE MANAGEMENT/SOCIAL WORK - PATIENT PORTAL LINK FT
You can access the FollowMyHealth Patient Portal offered by [facility] by registering at the following website: [email or web address]. By joining [facility]’s FollowMyHealth portal, you will also be able to view your health information using other applications (apps) compatible with our system.

## 2025-06-08 NOTE — CASE MANAGEMENT PROGRESS NOTE - NSCMPROGRESSNOTE_GEN_ALL_CORE
Per MD, patient is medically cleared for discharge home today.  CM met with patient at bedside to discussed discharge disposition is home. Patient lives with wife and kids.  Patient is fully independent with ambulation and ADLs. Patient will take an Uber home.  Patient verbalized understanding of the transition plan and is in agreement. CM answered all questions to the best of my abilities. CM remains available throughout hospital stay.

## 2025-06-08 NOTE — DISCHARGE NOTE PROVIDER - CARE PROVIDERS DIRECT ADDRESSES
[email or web address]

## 2025-06-08 NOTE — DISCHARGE NOTE PROVIDER - CARE PROVIDER_API CALL
Real Turner  Internal Medicine  [address] [place] [id number]  Phone: [phone]  Fax: [phone]  Follow Up Time: 1 week    Ez Mendenhall  Urology  [address] [place] [id number]  Phone: [phone]  Fax: [phone]  Follow Up Time: 1 week

## 2025-06-09 ENCOUNTER — APPOINTMENT (OUTPATIENT)
Dept: HEMATOLOGY ONCOLOGY | Facility: CLINIC | Age: 40
End: 2025-06-09
Payer: COMMERCIAL

## 2025-06-09 PROCEDURE — 99215 OFFICE O/P EST HI 40 MIN: CPT | Mod: 95

## 2025-06-10 ENCOUNTER — INPATIENT (INPATIENT)
Facility: HOSPITAL | Age: 40
LOS: 1 days | Discharge: NURSING, REHAB OR LONG-TERM CARE | DRG: 696 | End: 2025-06-12
Attending: STUDENT IN AN ORGANIZED HEALTH CARE EDUCATION/TRAINING PROGRAM | Admitting: STUDENT IN AN ORGANIZED HEALTH CARE EDUCATION/TRAINING PROGRAM
Payer: COMMERCIAL

## 2025-06-10 VITALS
SYSTOLIC BLOOD PRESSURE: 144 MMHG | RESPIRATION RATE: 20 BRPM | WEIGHT: 199.96 LBS | DIASTOLIC BLOOD PRESSURE: 93 MMHG | TEMPERATURE: 99 F | OXYGEN SATURATION: 99 % | HEIGHT: 73 IN | HEART RATE: 76 BPM

## 2025-06-10 DIAGNOSIS — K08.409 PARTIAL LOSS OF TEETH, UNSPECIFIED CAUSE, UNSPECIFIED CLASS: Chronic | ICD-10-CM

## 2025-06-10 LAB
ALBUMIN SERPL ELPH-MCNC: 3.8 G/DL — SIGNIFICANT CHANGE UP (ref 3.3–5)
ALP SERPL-CCNC: 49 U/L — SIGNIFICANT CHANGE UP (ref 40–120)
ALT FLD-CCNC: 28 U/L — SIGNIFICANT CHANGE UP (ref 12–78)
ANION GAP SERPL CALC-SCNC: 8 MMOL/L — SIGNIFICANT CHANGE UP (ref 5–17)
AST SERPL-CCNC: 37 U/L — SIGNIFICANT CHANGE UP (ref 15–37)
BASOPHILS # BLD AUTO: 0.04 K/UL — SIGNIFICANT CHANGE UP (ref 0–0.2)
BASOPHILS NFR BLD AUTO: 0.3 % — SIGNIFICANT CHANGE UP (ref 0–2)
BILIRUB SERPL-MCNC: 0.6 MG/DL — SIGNIFICANT CHANGE UP (ref 0.2–1.2)
BUN SERPL-MCNC: 15 MG/DL — SIGNIFICANT CHANGE UP (ref 7–23)
CALCIUM SERPL-MCNC: 9.3 MG/DL — SIGNIFICANT CHANGE UP (ref 8.5–10.1)
CHLORIDE SERPL-SCNC: 104 MMOL/L — SIGNIFICANT CHANGE UP (ref 96–108)
CO2 SERPL-SCNC: 29 MMOL/L — SIGNIFICANT CHANGE UP (ref 22–31)
CREAT SERPL-MCNC: 1.3 MG/DL — SIGNIFICANT CHANGE UP (ref 0.5–1.3)
EGFR: 70 ML/MIN/1.73M2 — SIGNIFICANT CHANGE UP
EGFR: 70 ML/MIN/1.73M2 — SIGNIFICANT CHANGE UP
EOSINOPHIL # BLD AUTO: 0.04 K/UL — SIGNIFICANT CHANGE UP (ref 0–0.5)
EOSINOPHIL NFR BLD AUTO: 0.3 % — SIGNIFICANT CHANGE UP (ref 0–6)
GLUCOSE SERPL-MCNC: 103 MG/DL — HIGH (ref 70–99)
HCT VFR BLD CALC: 36.2 % — LOW (ref 39–50)
HGB BLD-MCNC: 11.9 G/DL — LOW (ref 13–17)
IMM GRANULOCYTES NFR BLD AUTO: 0.4 % — SIGNIFICANT CHANGE UP (ref 0–0.9)
LYMPHOCYTES # BLD AUTO: 1.98 K/UL — SIGNIFICANT CHANGE UP (ref 1–3.3)
LYMPHOCYTES # BLD AUTO: 16.6 % — SIGNIFICANT CHANGE UP (ref 13–44)
MCHC RBC-ENTMCNC: 31.1 PG — SIGNIFICANT CHANGE UP (ref 27–34)
MCHC RBC-ENTMCNC: 32.9 G/DL — SIGNIFICANT CHANGE UP (ref 32–36)
MCV RBC AUTO: 94.5 FL — SIGNIFICANT CHANGE UP (ref 80–100)
MONOCYTES # BLD AUTO: 0.57 K/UL — SIGNIFICANT CHANGE UP (ref 0–0.9)
MONOCYTES NFR BLD AUTO: 4.8 % — SIGNIFICANT CHANGE UP (ref 2–14)
NEUTROPHILS # BLD AUTO: 9.27 K/UL — HIGH (ref 1.8–7.4)
NEUTROPHILS NFR BLD AUTO: 77.6 % — HIGH (ref 43–77)
NRBC BLD AUTO-RTO: 0 /100 WBCS — SIGNIFICANT CHANGE UP (ref 0–0)
PLATELET # BLD AUTO: 399 K/UL — SIGNIFICANT CHANGE UP (ref 150–400)
POTASSIUM SERPL-MCNC: 5 MMOL/L — SIGNIFICANT CHANGE UP (ref 3.5–5.3)
POTASSIUM SERPL-SCNC: 5 MMOL/L — SIGNIFICANT CHANGE UP (ref 3.5–5.3)
PROT SERPL-MCNC: 7.7 G/DL — SIGNIFICANT CHANGE UP (ref 6–8.3)
RBC # BLD: 3.83 M/UL — LOW (ref 4.2–5.8)
RBC # FLD: 11.7 % — SIGNIFICANT CHANGE UP (ref 10.3–14.5)
SODIUM SERPL-SCNC: 141 MMOL/L — SIGNIFICANT CHANGE UP (ref 135–145)
WBC # BLD: 11.95 K/UL — HIGH (ref 3.8–10.5)
WBC # FLD AUTO: 11.95 K/UL — HIGH (ref 3.8–10.5)

## 2025-06-10 PROCEDURE — 99285 EMERGENCY DEPT VISIT HI MDM: CPT

## 2025-06-10 RX ORDER — ACETAMINOPHEN 500 MG/5ML
1000 LIQUID (ML) ORAL ONCE
Refills: 0 | Status: COMPLETED | OUTPATIENT
Start: 2025-06-10 | End: 2025-06-10

## 2025-06-10 RX ADMIN — Medication 4 MILLIGRAM(S): at 22:39

## 2025-06-10 RX ADMIN — Medication 1000 MILLILITER(S): at 22:39

## 2025-06-10 RX ADMIN — Medication 400 MILLIGRAM(S): at 22:39

## 2025-06-10 RX ADMIN — Medication 1000 MILLILITER(S): at 23:00

## 2025-06-10 RX ADMIN — Medication 1000 MILLIGRAM(S): at 23:00

## 2025-06-10 RX ADMIN — Medication 4 MILLIGRAM(S): at 23:00

## 2025-06-10 NOTE — ED ADULT TRIAGE NOTE - CHIEF COMPLAINT QUOTE
Patient complaining of urinary retention, h/x of factor IX clotting disorder, has hematuria with little output. Stopped taking ASA 81mg friday

## 2025-06-10 NOTE — ED PROVIDER NOTE - NSFOLLOWUPINSTRUCTIONS_ED_ALL_ED_FT
Take antibiotics as directed    Please take over the counter pain medications such as Motrin (600mg every 6 hours) and Tylenol (650mg every 4 hours) for pain     Follow up with your urologist in 2-3 days for further evaluation of your symptoms     A urinary tract infection (UTI) is an infection of any part of the urinary tract, which includes the kidneys, ureters, bladder, and urethra. Risk factors include ignoring your need to urinate, wiping back to front if female, being an uncircumcised male, and having diabetes or a weak immune system. Symptoms include frequent urination, pain or burning with urination, foul smelling urine, cloudy urine, pain in the lower abdomen, blood in the urine, and fever. If you were prescribed an antibiotic medicine, take it as told by your health care provider. Do not stop taking the antibiotic even if you start to feel better.    SEEK IMMEDIATE MEDICAL CARE IF YOU HAVE ANY OF THE FOLLOWING SYMPTOMS: severe back or abdominal pain, fever, inability to keep fluids or medicine down, dizziness/lightheadedness, or a change in mental status.

## 2025-06-10 NOTE — ED PROVIDER NOTE - CARE PROVIDER_API CALL
Ez Mendenhall  Urology  [address] [place] [id number]  Phone: [phone]  Fax: [phone]  Follow Up Time: Urgent

## 2025-06-10 NOTE — ED PROVIDER NOTE - PROGRESS NOTE DETAILS
spoke to Uro PA who will eval pt in ED. 800cc of urine expelled after 3 way newman placement spoke to Uro PA who will eval pt in ED. 800cc of dark red urine expelled after 3 way newman placement uro PA saw pt: states when the CBI was paused, the urine became darker. CBI started again and will keep pt on CBI for now and reassess in a few hrs to figure out plan pt reassessed: resting comfortably in stretcher in NAD. still has some clots coming out of newman from CBI. updated uro PA; awaiting final uro recs Uro PA came to eval pt: pt still having dark urine despite CBI still ongoing. Recommending medicine admission. Uro attg will get back with final recs later    Endorsed to Dr. Benson for medicine admission. Pt updated about plan and is amenable for admission

## 2025-06-10 NOTE — ED ADULT NURSE NOTE - OBJECTIVE STATEMENT
Pt arrived to Ed c/o urinary retention since 1500 hrs today, only able to urinate small amounts of blood. Pt s/p lithotripsy unsuccessful on 5/22, stent placement then removal on 5/28. States was able to urinate well after removal until this past Friday when he noted clots in urine and LLQ abd pain. Bladder scan noted 183 mL urine. Labs sent, fluids running, meds given, pt sent to CT prior to Ibrahim insertion per his request. Safety maintained, nursing care ongoing.

## 2025-06-10 NOTE — ED PROVIDER NOTE - CLINICAL SUMMARY MEDICAL DECISION MAKING FREE TEXT BOX
40M PMH CAD, HLD, Nephrolithiasis, GERD p/w urinary retention. pt had lithotripsy that was unsuccessful on 5/22 and eventual stent placement with removal on 5/28. Pt states on Fri he bgan having LLQ pain with blood clots in urine. Pt was previously on asa but stopped on fri. Was previoulsy able to expel clots from urine but now not able to. PVR 190cc on bladder scan 40M PMH CAD, HLD, Nephrolithiasis, GERD p/w urinary retention. pt had lithotripsy that was unsuccessful on 5/22 and eventual stent placement with removal on 5/28. Pt states on Fri he bgan having LLQ pain with blood clots in urine. Pt was previously on asa but stopped on fri. Was previoulsy able to expel clots from urine but now not able to. PVR 190cc on bladder scan    Gen: NAD, non-toxic appearing, awake alert   HEENT: normal conjunctiva, oral mucosa moist  Lung: CTAB, no respiratory distress, no wheezes/rhonchi/rales B/L, speaking in full sentences  CV: RRR  Abd: soft, LLQ TTP, ND, no guarding, no rigidity, no rebound tenderness, no CVA tenderness   MSK: no visible deformities  Skin: Warm, well perfused    DDx includes but not limited to: uti vs post op bleeding vs safia vs lyte derangments vs kidney stone  Plan: ct, labs, cbi, reassess symptoms    See Progress Notes for further updates on ED Course

## 2025-06-10 NOTE — ED ADULT NURSE REASSESSMENT NOTE - NS ED NURSE REASSESS COMMENT FT1
# 18 Austrian Ibrahim inserted with 800 Ml bloody urine was returned and connected to CBI. No clots. Pt tolerated procedure well

## 2025-06-11 ENCOUNTER — TRANSCRIPTION ENCOUNTER (OUTPATIENT)
Age: 40
End: 2025-06-11

## 2025-06-11 DIAGNOSIS — I25.10 ATHEROSCLEROTIC HEART DISEASE OF NATIVE CORONARY ARTERY WITHOUT ANGINA PECTORIS: ICD-10-CM

## 2025-06-11 DIAGNOSIS — R33.9 RETENTION OF URINE, UNSPECIFIED: ICD-10-CM

## 2025-06-11 DIAGNOSIS — Z29.9 ENCOUNTER FOR PROPHYLACTIC MEASURES, UNSPECIFIED: ICD-10-CM

## 2025-06-11 DIAGNOSIS — F41.9 ANXIETY DISORDER, UNSPECIFIED: ICD-10-CM

## 2025-06-11 DIAGNOSIS — K21.9 GASTRO-ESOPHAGEAL REFLUX DISEASE WITHOUT ESOPHAGITIS: ICD-10-CM

## 2025-06-11 LAB
ALBUMIN SERPL ELPH-MCNC: 3.5 G/DL — SIGNIFICANT CHANGE UP (ref 3.3–5)
ALP SERPL-CCNC: 55 U/L — SIGNIFICANT CHANGE UP (ref 40–120)
ALT FLD-CCNC: 22 U/L — SIGNIFICANT CHANGE UP (ref 12–78)
ANION GAP SERPL CALC-SCNC: 3 MMOL/L — LOW (ref 5–17)
APPEARANCE UR: CLEAR — SIGNIFICANT CHANGE UP
AST SERPL-CCNC: 16 U/L — SIGNIFICANT CHANGE UP (ref 15–37)
BASOPHILS # BLD AUTO: 0.03 K/UL — SIGNIFICANT CHANGE UP (ref 0–0.2)
BASOPHILS NFR BLD AUTO: 0.3 % — SIGNIFICANT CHANGE UP (ref 0–2)
BILIRUB SERPL-MCNC: 0.4 MG/DL — SIGNIFICANT CHANGE UP (ref 0.2–1.2)
BILIRUB UR-MCNC: NEGATIVE — SIGNIFICANT CHANGE UP
BUN SERPL-MCNC: 10 MG/DL — SIGNIFICANT CHANGE UP (ref 7–23)
CALCIUM SERPL-MCNC: 9.2 MG/DL — SIGNIFICANT CHANGE UP (ref 8.5–10.1)
CHLORIDE SERPL-SCNC: 105 MMOL/L — SIGNIFICANT CHANGE UP (ref 96–108)
CHOLEST SERPL-MCNC: 105 MG/DL — SIGNIFICANT CHANGE UP
CO2 SERPL-SCNC: 31 MMOL/L — SIGNIFICANT CHANGE UP (ref 22–31)
COLOR SPEC: ABNORMAL
CREAT SERPL-MCNC: 1.3 MG/DL — SIGNIFICANT CHANGE UP (ref 0.5–1.3)
DIFF PNL FLD: ABNORMAL
EGFR: 70 ML/MIN/1.73M2 — SIGNIFICANT CHANGE UP
EGFR: 70 ML/MIN/1.73M2 — SIGNIFICANT CHANGE UP
EOSINOPHIL # BLD AUTO: 0.06 K/UL — SIGNIFICANT CHANGE UP (ref 0–0.5)
EOSINOPHIL NFR BLD AUTO: 0.6 % — SIGNIFICANT CHANGE UP (ref 0–6)
FERRITIN SERPL-MCNC: 266 NG/ML — SIGNIFICANT CHANGE UP (ref 30–400)
GLUCOSE SERPL-MCNC: 115 MG/DL — HIGH (ref 70–99)
GLUCOSE UR QL: NEGATIVE MG/DL — SIGNIFICANT CHANGE UP
HCT VFR BLD CALC: 35 % — LOW (ref 39–50)
HDLC SERPL-MCNC: 30 MG/DL — LOW
HGB BLD-MCNC: 11.7 G/DL — LOW (ref 13–17)
IMM GRANULOCYTES NFR BLD AUTO: 0.2 % — SIGNIFICANT CHANGE UP (ref 0–0.9)
IRON SATN MFR SERPL: 19 UG/DL — LOW (ref 45–165)
IRON SATN MFR SERPL: 8 % — LOW (ref 16–55)
KETONES UR QL: NEGATIVE MG/DL — SIGNIFICANT CHANGE UP
LDLC SERPL-MCNC: 60 MG/DL — SIGNIFICANT CHANGE UP
LEUKOCYTE ESTERASE UR-ACNC: ABNORMAL
LIPID PNL WITH DIRECT LDL SERPL: 60 MG/DL — SIGNIFICANT CHANGE UP
LYMPHOCYTES # BLD AUTO: 1.15 K/UL — SIGNIFICANT CHANGE UP (ref 1–3.3)
LYMPHOCYTES # BLD AUTO: 11.5 % — LOW (ref 13–44)
MAGNESIUM SERPL-MCNC: 1.9 MG/DL — SIGNIFICANT CHANGE UP (ref 1.6–2.6)
MCHC RBC-ENTMCNC: 31.3 PG — SIGNIFICANT CHANGE UP (ref 27–34)
MCHC RBC-ENTMCNC: 33.4 G/DL — SIGNIFICANT CHANGE UP (ref 32–36)
MCV RBC AUTO: 93.6 FL — SIGNIFICANT CHANGE UP (ref 80–100)
MONOCYTES # BLD AUTO: 0.64 K/UL — SIGNIFICANT CHANGE UP (ref 0–0.9)
MONOCYTES NFR BLD AUTO: 6.4 % — SIGNIFICANT CHANGE UP (ref 2–14)
NEUTROPHILS # BLD AUTO: 8.08 K/UL — HIGH (ref 1.8–7.4)
NEUTROPHILS NFR BLD AUTO: 81 % — HIGH (ref 43–77)
NITRITE UR-MCNC: NEGATIVE — SIGNIFICANT CHANGE UP
NONHDLC SERPL-MCNC: 75 MG/DL — SIGNIFICANT CHANGE UP
NRBC BLD AUTO-RTO: 0 /100 WBCS — SIGNIFICANT CHANGE UP (ref 0–0)
PH UR: 7 — SIGNIFICANT CHANGE UP (ref 5–8)
PHOSPHATE SERPL-MCNC: 2.8 MG/DL — SIGNIFICANT CHANGE UP (ref 2.5–4.5)
PLATELET # BLD AUTO: 377 K/UL — SIGNIFICANT CHANGE UP (ref 150–400)
POTASSIUM SERPL-MCNC: 4 MMOL/L — SIGNIFICANT CHANGE UP (ref 3.5–5.3)
POTASSIUM SERPL-SCNC: 4 MMOL/L — SIGNIFICANT CHANGE UP (ref 3.5–5.3)
PROT SERPL-MCNC: 6.9 G/DL — SIGNIFICANT CHANGE UP (ref 6–8.3)
PROT UR-MCNC: 100 MG/DL
RBC # BLD: 3.74 M/UL — LOW (ref 4.2–5.8)
RBC # FLD: 11.7 % — SIGNIFICANT CHANGE UP (ref 10.3–14.5)
SODIUM SERPL-SCNC: 139 MMOL/L — SIGNIFICANT CHANGE UP (ref 135–145)
SP GR SPEC: 1 — LOW (ref 1–1.03)
TIBC SERPL-MCNC: 243 UG/DL — SIGNIFICANT CHANGE UP (ref 220–430)
TRIGL SERPL-MCNC: 72 MG/DL — SIGNIFICANT CHANGE UP
UIBC SERPL-MCNC: 224 UG/DL — SIGNIFICANT CHANGE UP (ref 110–370)
UROBILINOGEN FLD QL: 0.2 MG/DL — SIGNIFICANT CHANGE UP (ref 0.2–1)
WBC # BLD: 9.98 K/UL — SIGNIFICANT CHANGE UP (ref 3.8–10.5)
WBC # FLD AUTO: 9.98 K/UL — SIGNIFICANT CHANGE UP (ref 3.8–10.5)

## 2025-06-11 PROCEDURE — 99254 IP/OBS CNSLTJ NEW/EST MOD 60: CPT

## 2025-06-11 PROCEDURE — 99223 1ST HOSP IP/OBS HIGH 75: CPT | Mod: GC

## 2025-06-11 PROCEDURE — 99233 SBSQ HOSP IP/OBS HIGH 50: CPT

## 2025-06-11 RX ORDER — HYDROMORPHONE/SOD CHLOR,ISO/PF 2 MG/10 ML
1 SYRINGE (ML) INJECTION EVERY 6 HOURS
Refills: 0 | Status: DISCONTINUED | OUTPATIENT
Start: 2025-06-11 | End: 2025-06-12

## 2025-06-11 RX ORDER — CEFTRIAXONE 500 MG/1
1 INJECTION, POWDER, FOR SOLUTION INTRAMUSCULAR; INTRAVENOUS
Qty: 0 | Refills: 0 | DISCHARGE
Start: 2025-06-11

## 2025-06-11 RX ORDER — HYDROMORPHONE/SOD CHLOR,ISO/PF 2 MG/10 ML
0.5 SYRINGE (ML) INJECTION ONCE
Refills: 0 | Status: DISCONTINUED | OUTPATIENT
Start: 2025-06-11 | End: 2025-06-11

## 2025-06-11 RX ORDER — CEFUROXIME SODIUM 1.5 G
1 VIAL (EA) INJECTION
Qty: 20 | Refills: 0
Start: 2025-06-11 | End: 2025-06-20

## 2025-06-11 RX ORDER — DIAZEPAM 5 MG/1
1 TABLET ORAL
Qty: 0 | Refills: 0 | DISCHARGE
Start: 2025-06-11

## 2025-06-11 RX ORDER — MELATONIN 5 MG
1 TABLET ORAL
Qty: 0 | Refills: 0 | DISCHARGE
Start: 2025-06-11

## 2025-06-11 RX ORDER — BISACODYL 5 MG
5 TABLET, DELAYED RELEASE (ENTERIC COATED) ORAL DAILY
Refills: 0 | Status: DISCONTINUED | OUTPATIENT
Start: 2025-06-11 | End: 2025-06-12

## 2025-06-11 RX ORDER — ACETAMINOPHEN 500 MG/5ML
2 LIQUID (ML) ORAL
Qty: 0 | Refills: 0 | DISCHARGE
Start: 2025-06-11

## 2025-06-11 RX ORDER — NALOXONE HYDROCHLORIDE 0.4 MG/ML
0.4 INJECTION, SOLUTION INTRAMUSCULAR; INTRAVENOUS; SUBCUTANEOUS ONCE
Refills: 0 | Status: DISCONTINUED | OUTPATIENT
Start: 2025-06-11 | End: 2025-06-12

## 2025-06-11 RX ORDER — ESCITALOPRAM OXALATE 20 MG/1
10 TABLET ORAL DAILY
Refills: 0 | Status: DISCONTINUED | OUTPATIENT
Start: 2025-06-11 | End: 2025-06-12

## 2025-06-11 RX ORDER — HYDROMORPHONE/SOD CHLOR,ISO/PF 2 MG/10 ML
0.5 SYRINGE (ML) INJECTION EVERY 6 HOURS
Refills: 0 | Status: DISCONTINUED | OUTPATIENT
Start: 2025-06-11 | End: 2025-06-12

## 2025-06-11 RX ORDER — POLYETHYLENE GLYCOL 3350 17 G/17G
17 POWDER, FOR SOLUTION ORAL
Qty: 0 | Refills: 0 | DISCHARGE
Start: 2025-06-11

## 2025-06-11 RX ORDER — MELATONIN 5 MG
3 TABLET ORAL AT BEDTIME
Refills: 0 | Status: DISCONTINUED | OUTPATIENT
Start: 2025-06-11 | End: 2025-06-12

## 2025-06-11 RX ORDER — SENNA 187 MG
2 TABLET ORAL
Qty: 0 | Refills: 0 | DISCHARGE
Start: 2025-06-11

## 2025-06-11 RX ORDER — BISACODYL 5 MG
1 TABLET, DELAYED RELEASE (ENTERIC COATED) ORAL
Qty: 0 | Refills: 0 | DISCHARGE
Start: 2025-06-11

## 2025-06-11 RX ORDER — DIAZEPAM 5 MG/1
2 TABLET ORAL EVERY 6 HOURS
Refills: 0 | Status: DISCONTINUED | OUTPATIENT
Start: 2025-06-11 | End: 2025-06-12

## 2025-06-11 RX ORDER — CEFTRIAXONE 500 MG/1
1000 INJECTION, POWDER, FOR SOLUTION INTRAMUSCULAR; INTRAVENOUS ONCE
Refills: 0 | Status: COMPLETED | OUTPATIENT
Start: 2025-06-11 | End: 2025-06-11

## 2025-06-11 RX ORDER — POLYETHYLENE GLYCOL 3350 17 G/17G
17 POWDER, FOR SOLUTION ORAL DAILY
Refills: 0 | Status: DISCONTINUED | OUTPATIENT
Start: 2025-06-11 | End: 2025-06-12

## 2025-06-11 RX ORDER — SENNA 187 MG
2 TABLET ORAL AT BEDTIME
Refills: 0 | Status: DISCONTINUED | OUTPATIENT
Start: 2025-06-11 | End: 2025-06-12

## 2025-06-11 RX ORDER — ROSUVASTATIN CALCIUM 20 MG/1
20 TABLET, FILM COATED ORAL AT BEDTIME
Refills: 0 | Status: DISCONTINUED | OUTPATIENT
Start: 2025-06-11 | End: 2025-06-12

## 2025-06-11 RX ORDER — MAGNESIUM, ALUMINUM HYDROXIDE 200-200 MG
30 TABLET,CHEWABLE ORAL
Qty: 0 | Refills: 0 | DISCHARGE
Start: 2025-06-11

## 2025-06-11 RX ORDER — MAGNESIUM, ALUMINUM HYDROXIDE 200-200 MG
30 TABLET,CHEWABLE ORAL EVERY 4 HOURS
Refills: 0 | Status: DISCONTINUED | OUTPATIENT
Start: 2025-06-11 | End: 2025-06-12

## 2025-06-11 RX ORDER — HYDROMORPHONE/SOD CHLOR,ISO/PF 2 MG/10 ML
1 SYRINGE (ML) INJECTION
Qty: 0 | Refills: 0 | DISCHARGE
Start: 2025-06-11

## 2025-06-11 RX ORDER — ACETAMINOPHEN 500 MG/5ML
650 LIQUID (ML) ORAL EVERY 6 HOURS
Refills: 0 | Status: DISCONTINUED | OUTPATIENT
Start: 2025-06-11 | End: 2025-06-12

## 2025-06-11 RX ORDER — CEFTRIAXONE 500 MG/1
1000 INJECTION, POWDER, FOR SOLUTION INTRAMUSCULAR; INTRAVENOUS EVERY 24 HOURS
Refills: 0 | Status: DISCONTINUED | OUTPATIENT
Start: 2025-06-12 | End: 2025-06-12

## 2025-06-11 RX ADMIN — CEFTRIAXONE 1000 MILLIGRAM(S): 500 INJECTION, POWDER, FOR SOLUTION INTRAMUSCULAR; INTRAVENOUS at 02:00

## 2025-06-11 RX ADMIN — Medication 4 MILLIGRAM(S): at 02:29

## 2025-06-11 RX ADMIN — Medication 4 MILLIGRAM(S): at 03:29

## 2025-06-11 RX ADMIN — Medication 1 MILLIGRAM(S): at 15:00

## 2025-06-11 RX ADMIN — POLYETHYLENE GLYCOL 3350 17 GRAM(S): 17 POWDER, FOR SOLUTION ORAL at 11:52

## 2025-06-11 RX ADMIN — ESCITALOPRAM OXALATE 10 MILLIGRAM(S): 20 TABLET ORAL at 11:52

## 2025-06-11 RX ADMIN — Medication 1000 MILLILITER(S): at 03:00

## 2025-06-11 RX ADMIN — Medication 2 TABLET(S): at 21:26

## 2025-06-11 RX ADMIN — ROSUVASTATIN CALCIUM 20 MILLIGRAM(S): 20 TABLET, FILM COATED ORAL at 21:26

## 2025-06-11 RX ADMIN — Medication 100 MILLILITER(S): at 11:46

## 2025-06-11 RX ADMIN — Medication 0.5 MILLIGRAM(S): at 10:43

## 2025-06-11 RX ADMIN — Medication 0.5 MILLIGRAM(S): at 03:57

## 2025-06-11 RX ADMIN — Medication 1 MILLIGRAM(S): at 14:24

## 2025-06-11 RX ADMIN — Medication 0.5 MILLIGRAM(S): at 05:00

## 2025-06-11 RX ADMIN — Medication 100 MILLILITER(S): at 03:58

## 2025-06-11 RX ADMIN — Medication 1000 MILLILITER(S): at 02:29

## 2025-06-11 RX ADMIN — Medication 0.5 MILLIGRAM(S): at 06:24

## 2025-06-11 RX ADMIN — Medication 40 MILLIGRAM(S): at 06:24

## 2025-06-11 RX ADMIN — Medication 1 MILLIGRAM(S): at 20:28

## 2025-06-11 RX ADMIN — DIAZEPAM 2 MILLIGRAM(S): 5 TABLET ORAL at 13:33

## 2025-06-11 RX ADMIN — CEFTRIAXONE 100 MILLIGRAM(S): 500 INJECTION, POWDER, FOR SOLUTION INTRAMUSCULAR; INTRAVENOUS at 01:34

## 2025-06-11 NOTE — PROGRESS NOTE ADULT - ASSESSMENT
A/P:  39yo Male with PMHx anxiety, GERD, HLD, CAD, L kidney calcifications s/p cystoscopy, lithotripsy, with ureteral stricture dilation, stent placement (5/22/25, Dylan) with subsequent stent removal 5/29/25 presented with complaint of spontaneous hematuria and acute urinary retention currently on CBI      Hematology consult  Tentative return to OR for Cystoscopy, Ureteroscopy, possible clot evacuation pending Hematology clearance  Continue CBI  Encourage ambulation   Continue bowel regimen  Minimize narcotics/benzodiazepines/anticholinergics/antihistamines if possible         BARRETT Buckner  Division of Urology  Available on Teams            A/P:  41yo Male with PMHx anxiety, GERD, HLD, CAD, L kidney calcifications s/p cystoscopy, lithotripsy, with ureteral stricture dilation, stent placement (5/22/25, Dylan) with subsequent stent removal 5/29/25 presented with complaint of spontaneous hematuria and acute urinary retention currently on CBI    Continue Ceftriaxone  FU UCx  Hematology consult  Tentative return to OR for Cystoscopy, Ureteroscopy, possible clot evacuation pending Hematology/Medicine clearance  Continue CBI  DVT ppx- SCD's, OOB as tolerated  Continue bowel regimen  Minimize narcotics/benzodiazepines/anticholinergics/antihistamines if possible         BARRETT Buckner  Division of Urology  Available on Teams

## 2025-06-11 NOTE — DISCHARGE NOTE PROVIDER - NSDCCPCAREPLAN_GEN_ALL_CORE_FT
- [facility]


   [facility]


   Last Admin: 02/11/19 12:56 Dose:  100 mls


Insulin Glargine 8 units/ (Miscellaneous Medication)  0.08 mls @ 0 mls/hr SC 

QAM [facility]


PRINCIPAL DISCHARGE DIAGNOSIS  Diagnosis: Hematuria  Assessment and Plan of Treatment: Patient was found to have recurrent hematuria. Was discharged and returned secondary to hematuria. PRICILLA improved. started on CBI per urology. Recommended for cystoscopy/ureteroscopy with possible clot evacuation. Given factor IX deficiency, discussed with hematology and urology. [facility] given more resources to manage factor IX deficiency.      SECONDARY DISCHARGE DIAGNOSES  Diagnosis: Acute UTI  Assessment and Plan of Treatment: Started on rocephin 1 gm daily    Diagnosis: CAD (coronary artery disease)  Assessment and Plan of Treatment: You were taken off aspirin.

## 2025-06-11 NOTE — CONSULT NOTE ADULT - ASSESSMENT
[ASSESSMENT and  PLAN]  D67     Hemophilia B  R31.0  Gross hematuria  D62     Anemia due to blood loss    41yo  M with recent dx of mild hemophilia B  FIX levels ~ 30%, seen by , hematology, Dr Beltrán with recommendation post [facility].   presents to ER with hematuria. 3way newman placed. CBI started.   Prior with wisdom teeth removal in early adulthood with no bleeding. No prior bleeding episodes  no CARLOS on Renal stone protocol CT scan to suggest NHL or HD    mild anemia due to blood loss.       COMORBID  CAD (coronary artery disease) [I25.10]  Hyperlipidemia [E78.5]  Anxiety [F41.9]  GERD (gastroesophageal reflux disease) [K21.9]  Nephrolithiasis [N20.0]      RECOMMENDATIONS    Hemophilia B  appears to have mild form of FIX def  factor replacement product not available at this hosp, and if bleeding, would not typically have on hand.   As such with  bleed and need for additional procedures, recommend transfer to tertiary care hospital, preferably regional [facility].   will need to establish care with hematologist with hemophilia center and to continue care with them.   d/w pt and he is agreeable for transfer.     In event for significant acute bleeding, would keep FIX levels >50%  recommend hematology consultation at tertiary hospitals to take over care once there.     ANEMIA, mild due to blood loss.   HOLD transfusion,   Follow CBC     Transfuse PRBC as clinically indicated.      Transfuse PRBC if Hgb <7.0 or if symptomatic.     DC ASA for now    hematuria  Mgmt per urology  on CBI  d/w urology team    DVT Prophylaxis     AC Contraindicated. Recommend SCD        Discussed plan of care with patient in detail. Opportunity given for questions and discussion.   Pt expressed understanding of the treatment plan. Risks, benefits and alternatives discussed in detail.   Questions or concerns all addressed and answered to their satisfaction, and in lay terms.       Discussed with Dr Richardson, urology and BARRETT Bob.    Discussed with Dr NIDHI Palacios , medicine    > 70 minutes spent in direct patient care, examining and counseling patient,  reviewing  the notes, lab data/ imaging , discussion with multidisciplinary team.     Thank you for consulting us.      [ASSESSMENT and  PLAN]  D67     Hemophilia B  R31.0  Gross hematuria  D62     Anemia due to blood loss    39yo  M with recent dx of mild hemophilia B  FIX levels ~ 30%, seen by , hematology, Dr Beltrán with recommendation post dx [facility].   presents to ER with hematuria. 3way newman placed. CBI started.   Prior with wisdom teeth removal in early adulthood with no bleeding. No prior bleeding episodes  no CARLOS on Renal stone protocol CT scan 6/2025 and CTA chest and CT AP 02/2025 to suggest NHL or HD    mild anemia due to blood loss.       COMORBID  CAD (coronary artery disease) [I25.10]  Hyperlipidemia [E78.5]  Anxiety [F41.9]  GERD (gastroesophageal reflux disease) [K21.9]  Nephrolithiasis [N20.0]      RECOMMENDATIONS    Hemophilia B  appears to have mild form of FIX def  factor replacement product not available at this hosp, and if bleeding, would not typically have on hand.   As such with  bleed and need for additional procedures, recommend transfer to tertiary care hospital, preferably regional hem[facility] center.   will need to establish care with hematologist with hemophilia center and to continue care with them.   d/w pt and he is agreeable for transfer.     In event for significant acute bleeding, would keep FIX levels >50%  recommend hematology consultation at tertiary [facility] to take over care once there.     ANEMIA, mild due to blood loss.   HOLD transfusion,   Follow CBC     Transfuse PRBC as clinically indicated.      Transfuse PRBC if Hgb <7.0 or if symptomatic.     DC ASA for now    hematuria  Mgmt per urology  on CBI  d/w urology team    DVT Prophylaxis     AC Contraindicated. Recommend SCD        Discussed plan of care with patient in detail. Opportunity given for questions and discussion.   Pt expressed understanding of the treatment plan. Risks, benefits and alternatives discussed in detail.   Questions or concerns all addressed and answered to their satisfaction, and in lay terms.       Discussed with Dr Richardson, urology and BARRETT Bob.    Discussed with Dr NIDHI Palacios , medicine    > 70 minutes spent in direct patient care, examining and counseling patient,  reviewing  the notes, lab data/ imaging , discussion with multidisciplinary team.     Thank you for consulting us.

## 2025-06-11 NOTE — H&P ADULT - ATTENDING COMMENTS
Pt is a 41 y/o M with PMHx CAD, HLD, nephrolithiasis, GERD who presents with LLQ pain and passing blood clots in his urine, admitted with hematuria and urinary retention.    #Hematuria/Urinary Retention likely in setting of blood product  Pt presenting with urinary retention in setting of hematuria with clots  - VSS on arrival  - labs significant for WBC 11.95, H/H 11.9/36.2  - UA: large blood, moderate LE, 10 WBCs, occasional bacteria  - CT:  Similar mild to moderate left hydroureteronephrosis with high density intraluminal material within the renal collecting system and ureter, likely representing blood products. High density dependent material within the urinary bladder, likely representing blood products, new compared to 6/7/2025. Underlying neoplasm is not definitively excluded.  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 Refer to resident's note for chronic comorbidities  76 minutes spent on total encounter excluding teaching and separately reported services. The necessity of the time spent during the encounter on this date of service was due to:   activities including direct patient care, counseling and/or coordinating care, and reviewing notes/lab data/imaging.

## 2025-06-11 NOTE — DISCHARGE NOTE PROVIDER - HOSPITAL COURSE
. 41 y/o M with history of factor IX deficiency, CAD, HLD who presents to the ED for evaluation of recurrent hematuria. Patient was recently admitted for hematuria with PRICILLA. 41 y/o male with CAD, HLD, Nephrolithiasis, Factor IX deficiency and Anxiety who is s/p Cystoscopy with left ureteral stricture dilation on 5/22/25. ureteral stent was removed 5/29. He was admitted at [facility] on 6/7/25 for left flank pain with hematuria, PRICILLA and clots. Patient was started on IVF and was voiding. He was discharged home on 6/8/2025. He returned to [facility] ED on 6/10/15 for left sided flank pain with hematuria and difficulty urinate. He was seen by urology and started on CBI. Urology recommended cystoscopy/ureteroscopy with possible clot evacuation. He was seen by hematology and was recommended to transfer to [facility] given Factor IX deficiency with need for procedure.     ASA was held from prior admission because of hematuria.     consults:   Urology: Dr Richardson  heme: Dr Mejia     ---  TIME SPENT:  I, the attending physician, was physically present for the key portions of the evaluation and management (E/M) service provided. The total amount of time spent reviewing the hospital notes, laboratory values, imaging findings, assessing/counseling the patient, discussing with consultant physicians, social work, nursing staff was 45 minutes  This time spent excludes time spent teaching and/or separately reported services.   ---

## 2025-06-11 NOTE — ED ADULT NURSE REASSESSMENT NOTE - NS ED NURSE REASSESS COMMENT FT1
Pt observed with complaint of mid lower abdominal pain>CBI paused, and bladder was manually  irrigated with 400 ml normal saline with no clots observed. Pt was re-connected to CBI. Tolerated well, Medicated with Dilaudid>see flow sheet Pt observed with complaint of mid lower abdominal pain 6am>CBI paused, and bladder was manually  irrigated with 400 ml normal saline with no clots observed. Pt was re-connected to CBI. Tolerated well, Medicated with Dilaudid>see flow sheet

## 2025-06-11 NOTE — PATIENT PROFILE ADULT - NSPROMEDSBROUGHTTOHOSP_GEN_A_NUR
Initial SW/CM Assessment/Plan of Care Note     Baseline Assessment  84 year old admitted 10/18/2023 as Inpatient with a diagnosis of generalized weakness.   Prior to admission patient was living with Spouse and residing at House    . . Patient’s Primary Care Provider is Boni Jules MD.     Progress Note  Introduced CM role. Verified facesheet. Pt lives w/ sps & dtr. dtr helps & can  pt. When ready for discharge  P. Is agreeable with HC if needed.  Plan  Patient/Family Discharge Goal: Home      w/ sps & dtr  SW/CM - Recommendations for Discharge: Home   PT - Recommendations for Discharge:      Last Filed Values     None        OT - Recommendations for Discharge:      Last Filed Values     None        SLP - Recommendations for Discharge:    Last Filed Values     None          Barriers to Discharge  Identified Barriers to Discharge/Transition Planning:          Anticipate patient will need post-hospital services. Necessary services are available.  Anticipate patient can return to the environment from which patient entered the hospital.   Anticipate patient can provide self-care at discharge.    Refer to SW/CM Flowsheet for objective data.     Medical History  Past Medical History:   Diagnosis Date   • Chronic heart failure with preserved ejection fraction (HFpEF) (CMD) 10/25/2021    Echo (5/12/21): LVEF 69%, grade 2 DD, RVSP 43. Echo (2/27/22): LVEF 39%, dilated RV, severe RV dysfxn, moderate peric effusion. Echo (4/15/22): LVEF 55%, mild MR, normal RV, small effusion.   • Chronic kidney disease     ESRD on dialysis since Feb 2022   • Essential hypertension 09/13/2013    For at least 40 years; very dificult to control, on several antihypertensive meds. Only after adding Doxazosin his BP controlled. Renal duplex (02/13), 24 hour urine for VMA, metanephrine, catecholamines and free contisol were all normal.    • Hyperlipidemia 09/13/2013   • Hypertensive cardiomegaly 09/13/2013   • Longstanding persistent  atrial fibrillation 12/11/2018    [facility] (11/13/18) with irregular HR and low BP. Echo (11/13/18): EF 65%, LA severely dilated. EKG: a fib. [id number] score 5. Neurosurgery recommended coumadin over eliquis due to h/o schwannoma, pt and family insisted on Eliquis to avoid INR checks and dietary restrictions, clopidogrel continued for h/o TIA.   • Myocardial infarction (CMD)     2/2022   • New onset atrial fibrillation (CMD) 12/11/2018    Presented to [facility] (11/13/18 -11/14/18) with irregular HR and hypotension. Echo (11/13/18): EF 65%, LA severely dilated, RV function mildly reduced, mild TR. EKG: atrial fib. [id number] score 5, TSH of 2.3 . Neurosurgery recommended coumadin over eliquis due to the patient's history of schwannoma, however the patient insisted on Eliquis to avoid INR checks and dietary restrictions. After much discuss   • Obstructive sleep apnea syndrome 09/13/2013    had recent sleep study waiting for results, no longer using cpap after 70lb weight loss    • TIA (transient ischemic attack)     over 20 years ago no residual        Prior to Admission Status  Functional Status  Ambulation: Walker  Bathing: Independent/Self  Dressing: Independent/Self  Toileting: Independent/Self  Transportation: Family    Agency/Support  Type of Services Prior to Hospitalization: None               Support Systems: Family members, Spouse, Children   Home Devices/Equipment: Mobility assist device         Mobility Assist Devices: Standard walker  Sensory Support Devices: Eyeglasses      Current Status  PT Ambulation Tips:    PT Transfer Tips:     OT Bathing Tips:    OT Dressing Tips:    OT Toileting Tips:    OT Feeding Tips:    SLP Swallow/Feeding Tips:    SLP Comm/Cog Tips:    Current Mental Status:    Stressors:      Insurance  Primary: MEDICARE  Secondary: [facility]    Disposition Recommendations:  SW/CM recommendation for discharge: Home          no

## 2025-06-11 NOTE — CONSULT NOTE ADULT - NSCONSULTADDITIONALINFOA_GEN_ALL_CORE
I agree with assessment and plan. Discussed with patient need for cystoscopy, ureteroscopy and clot removal vs stent placement. Hematology recommending Factor IX infusion prior to any further intervention. Patient agrees with transfer to [facility] for procedure and medical therapy.

## 2025-06-11 NOTE — PROGRESS NOTE ADULT - SUBJECTIVE AND OBJECTIVE BOX
Patient is a 40y old  Male who presents with a chief complaint of Hematuria, urinary retention (11 Jun 2025 16:51)    INTERVAL HPI/OVERNIGHT EVENTS: Patient was seen and examined. on CBI with pinkish urine. Pain controlled.     I&O's Summary    10 Jose 2025 07:01  -  11 Jun 2025 07:00  --------------------------------------------------------  IN: 3000 mL / OUT: 6500 mL / NET: -3500 mL    11 Jun 2025 07:01  -  11 Jun 2025 17:19  --------------------------------------------------------  IN: 4050 mL / OUT: 7825 mL / NET: -3775 mL        LABS:                        11.7   9.98  )-----------( 377      ( 11 Jun 2025 12:30 )             35.0     06-11    139  |  105  |  10  ----------------------------<  115[H]  4.0   |  31  |  1.30    Ca    9.2      11 Jun 2025 12:30  Phos  2.8     06-11  Mg     1.9     06-11    TPro  6.9  /  Alb  3.5  /  TBili  0.4  /  DBili  x   /  AST  16  /  ALT  22  /  AlkPhos  55  06-11      Urinalysis Basic - ( 11 Jun 2025 12:30 )    Color: x / Appearance: x / SG: x / pH: x  Gluc: 115 mg/dL / Ketone: x  / Bili: x / Urobili: x   Blood: x / Protein: x / Nitrite: x   Leuk Esterase: x / RBC: x / WBC x   Sq Epi: x / Non Sq Epi: x / Bacteria: x      CAPILLARY BLOOD GLUCOSE            Urinalysis Basic - ( 11 Jun 2025 12:30 )    Color: x / Appearance: x / SG: x / pH: x  Gluc: 115 mg/dL / Ketone: x  / Bili: x / Urobili: x   Blood: x / Protein: x / Nitrite: x   Leuk Esterase: x / RBC: x / WBC x   Sq Epi: x / Non Sq Epi: x / Bacteria: x        MEDICATIONS  (STANDING):  cefTRIAXone   IVPB 1000 milliGRAM(s) IV Intermittent every 24 hours  escitalopram 10 milliGRAM(s) Oral daily  naloxone Injectable 0.4 milliGRAM(s) IV Push once  pantoprazole    Tablet 40 milliGRAM(s) Oral before breakfast  polyethylene glycol 3350 17 Gram(s) Oral daily  rosuvastatin 20 milliGRAM(s) Oral at bedtime  senna 2 Tablet(s) Oral at bedtime  sodium chloride 0.9%. 1000 milliLiter(s) (100 mL/Hr) IV Continuous <Continuous>    MEDICATIONS  (PRN):  acetaminophen     Tablet .. 650 milliGRAM(s) Oral every 6 hours PRN Temp greater or equal to 38C (100.4F), Mild Pain (1 - 3)  aluminum hydroxide/magnesium hydroxide/simethicone Suspension 30 milliLiter(s) Oral every 4 hours PRN Dyspepsia  bisacodyl 5 milliGRAM(s) Oral daily PRN Constipation  diazepam    Tablet 2 milliGRAM(s) Oral every 6 hours PRN Bladder spasm  HYDROmorphone  Injectable 0.5 milliGRAM(s) IV Push every 6 hours PRN Moderate Pain (4 - 6)  HYDROmorphone  Injectable 1 milliGRAM(s) IV Push every 6 hours PRN Severe Pain (7 - 10)  melatonin 3 milliGRAM(s) Oral at bedtime PRN Insomnia      REVIEW OF SYSTEMS:  CONSTITUTIONAL: No fever or chills  HEENT:  No headache, no sore throat  RESPIRATORY: No cough, wheezing, or shortness of breath  CARDIOVASCULAR: No chest pain  GASTROINTESTINAL: No abdominal pain, nausea, vomiting, or diarrhea  GENITOURINARY: No dysuria,+ hematuria   NEUROLOGICAL: no focal weakness   MUSCULOSKELETAL: no myalgias  PSYCH: no recent changes in mood    RADIOLOGY & ADDITIONAL TESTS:    Imaging Personally Reviewed:  [x] YES  [ ] NO    Consultant(s) Notes Reviewed:  [x] YES  [ ] NO    PHYSICAL EXAM:  T(C): 37.2 (06-11-25 @ 12:12), Max: 37.2 (06-11-25 @ 12:12)  HR: 73 (06-11-25 @ 12:12) (65 - 76)  BP: 131/77 (06-11-25 @ 12:12) (130/79 - 152/88)  RR: 18 (06-11-25 @ 12:12) (17 - 20)  SpO2: 96% (06-11-25 @ 12:12) (96% - 100%)    GENERAL: awake, oriented   HEENT:  anicteric, moist mucous membranes  CHEST/LUNG:  CTA b/l, no rales, wheezes, or rhonchi  HEART:  RRR, S1, S2  ABDOMEN:  BS+, soft, nontender, nondistended, + CBI with pinkish urine   EXTREMITIES: no edema  NERVOUS SYSTEM: answers questions  PSYCH: normal affect    Care Discussed with Consultants/Other Providers [x] YES  [ ] NO

## 2025-06-11 NOTE — H&P ADULT - NSHPREVIEWOFSYSTEMS_GEN_ALL_CORE
CONSTITUTIONAL: denies fever, chills, fatigue, weakness  HEENT: denies blurred vision, sore throat  CARDIOVASCULAR: denies chest pain, chest pressure, palpitations  RESPIRATORY: denies shortness of breath, sputum production  GASTROINTESTINAL: denies nausea, vomiting, diarrhea, abdominal pain  GENITOURINARY: +hematuria with clots, inability to urinate  NEUROLOGICAL: denies numbness, headache, focal weakness  HEMATOLOGIC: +hematuria

## 2025-06-11 NOTE — CONSULT NOTE ADULT - ASSESSMENT
Pt is a 39yo Male with PMHx anxiety, GERD, HLD, CAD, L kidney calcifications s/p cystoscopy, lithotripsy, with ureteral stricture dilation, stent placement (5/22/25, Dylan) with subsequent stent removal 5/29/25 presenting with complaint of spontaneous hematuria and acute urinary retention this evening. Of note patient recently presented to [place] ED on 6/7/25 with complaint of left sided cramping and hematuria with passage of clots.   PVR in ED 190cc, newman catheter placed and CBI started, clear pink on low rate CBI with no clots noted.   CTAP with findings of Similar mild to moderate left hydroureteronephrosis with high density intraluminal material within the renal collecting system and ureter, likely representing blood products. High density dependent material within the urinary bladder, likely representing blood products, new compared to 6/7/2025. Underlying neoplasm is not definitively excluded.  H/H 11.9/36.2, WBC 11.95. VSSAF.     Plan:  - Maintain newman, continue CBI titrating to clear pink  - Trend H/H, monitor vitals  - Continue to hold ASA in setting of hematuria  - Received 1 dose of ceftriaxone  - IVF  - Serial abdominal exams  - Pain control, supportive care  To be discussed with Dr. Richardson

## 2025-06-11 NOTE — H&P ADULT - NSHPSOCIALHISTORY_GEN_ALL_CORE
Lives: with wife  ADLs: independent  Diet: regular  Alcohol Use: denies  Tobacco Use: denies  Recreational Drug Use: denies

## 2025-06-11 NOTE — CARE COORDINATION ASSESSMENT. - DO YOU FEEL SAFE LIVING IN YOUR HOME?
EGD  Pre-Procedure Instructions      Patient Name: Nishant Simmons  Procedure Date:    Procedure Time:     Arrival Time:    Physician:    Location:        Please read these instructions thoroughly. If you have any questions, please call the physician's office at     TRANSPORTATION:  A responsible adult must drive you home after the procedure.  The medication given during the procedure may cause drowsiness, making it unsafe for you to drive or operate machinery.  A taxi is not acceptable transportation.  Please make arrangements in advance or we will not be able to do your procedure.    Please make arrangements for someone to stay with you or check in on you frequently the rest of the day/evening until the drowsiness has completely worn off.      CANCELLATION AND RESCHEDULE POLICY:  In the event that you need to cancel or reschedule your procedure, please call our schedulers:      ADDITIONAL INSTRUCTIONS:                                           SPECIAL CONDITIONS:  Contact your primary care physician if you have diabetes and take insulin. You may need to have your insulin dose adjusted the day before and the day of the procedure.    Do not drink any alcoholic beverages for at least 24 hours before the procedure.      FIVE (5) DAYS BEFORE THE PROCEDURE:  Do not take iron supplements or Pepto-Bismol. These products may make it more difficult for the physician to see the inside of the stomach.    Do not eat products with [place] (a fat substitute found in [employer] and [place] Fat-Free chips)    It is important to continue to take all other prescribed medications. On the day of the procedure, please take your blood pressure medications with a sip of water.    Do not take any blood thinning or platelet-modifying medications (such as Coumadin (warfarin), Plavix (clopidogrel), Aggrenox, Ticlid, etc.) unless otherwise directed.      DIET:  You may follow a normal diet up until midnight prior to your  Yes procedure.    Sips of water after midnight unless directed not to.            DAY OF THE PROCEDURE:  You may take your medications the morning of your procedure with sips of water.    Arrive for your procedure  minutes prior to your scheduled time.    You will receive sedation during the procedure: please make sure you have someone to drive you that day.    Do not plan on going to work or doing anything after the procedure, as you will be drowsy most of the afternoon.    AFTER THE PROCEDURE:  You may resume your regular diet. Start slow with small amounts and increase as you see how well you do.    Please call the physician's office that did the procedure if you have further questions or if you need additional care.      Check with the Doctor as to when you can resume your medications after the procedure.    RESULTS:  If tissue samples were take during your procedure, your results will take approximately 10 working days to return.     If you have not heard from the Doctor's office please call.

## 2025-06-11 NOTE — CONSULT NOTE ADULT - SUBJECTIVE AND OBJECTIVE BOX
Patient is a 40y old  Male who presents with a chief complaint of Hematuria, urinary retention (11 Jun 2025 16:51)    HPI:  Pt is a 41 y/o M with PMHx CAD, HLD, nephrolithiasis, GERD, factor IX deficiency, anxiety who presents with LLQ pain and passing blood clots in his urine. Pt had an unsuccessful lithotripsy on 5/22 and then subsequent stent placement 5/28. He now reports he began with LLQ pain, cramping, and hematuria earlier today, He became unable to urinate which he attributed to a clot, which prompted him to come in to be seen. He was recently diagnosed with factor IX deficiency, although is not on any treatment, home aspirin is on hold due to previous hematuria. Ibrahim placed in the ED with 800cc of dark urine, and CBI was started.    ED Course:   Vitals: BP: 144/93, HR: 76, Temp: 98.7, RR: 20, SpO2: 99% on RA  Labs:  WBC 11.95, H/H 11.9/36.2,   UA: large blood, moderate LE, 10 WBCs, occasional bacteria  CT:  Similar mild to moderate left hydroureteronephrosis with high density intraluminal material within the renal collecting system and ureter, likely representing blood products. High density dependent material within the urinary bladder, likely   representing blood products, new compared to 6/7/2025. Underlying neoplasm is not definitively excluded.  Received in the ED: rocephin, 1L NS bolus x2, ofirmev, dilaudid, morphine   (11 Jun 2025 06:00)      PAST MEDICAL & SURGICAL HISTORY:  CAD (coronary artery disease)  Hyperlipidemia  Anxiety  GERD (gastroesophageal reflux disease)  Nephrolithiasis  [place] teeth extracted       HEALTH ISSUES - PROBLEM Dx:  Urinary retention  CAD (coronary artery disease)  Need for prophylactic measure  Anxiety  GERD (gastroesophageal reflux disease)    Retention of urine [R33.9]  CAD (coronary artery disease) [I25.10]  Hyperlipidemia [E78.5]  Anxiety [F41.9]  GERD (gastroesophageal reflux disease) [K21.9]  Nephrolithiasis [N20.0]  No significant past surgical history [371882196]  [place] teeth extracted [K08.409]  Acute UTI [N39.0]  CAD (coronary artery disease) [I25.10]      FAMILY HISTORY:  FH: myocardial infarction (Mother)  FH: multiple sclerosis (Sibling)  1 of 2 children  1 patient  2 sister. --3yrs. MS    has 3 childrens  1 son 11yo  2 dtr 9yo  3 dtr 7yo      [SOCIAL HISTORY: ]     smoking:  none currently     EtOH:  none currently     illicit drugs:  none currently     occupation:  Retired     marital status:  , 3 children     Other:       [ALLERGIES/INTOLERANCES:]  Allergies     No Known Allergies  Intolerances      [MEDICATIONS]  MEDICATIONS  (STANDING):  cefTRIAXone   IVPB 1000 milliGRAM(s) IV Intermittent every 24 hours  escitalopram 10 milliGRAM(s) Oral daily  naloxone Injectable 0.4 milliGRAM(s) IV Push once  pantoprazole    Tablet 40 milliGRAM(s) Oral before breakfast  polyethylene glycol 3350 17 Gram(s) Oral daily  rosuvastatin 20 milliGRAM(s) Oral at bedtime  senna 2 Tablet(s) Oral at bedtime  sodium chloride 0.9%. 1000 milliLiter(s) (100 mL/Hr) IV Continuous <Continuous>      MEDICATIONS  (PRN):  acetaminophen     Tablet .. 650 milliGRAM(s) Oral every 6 hours PRN Temp greater or equal to 38C (100.4F), Mild Pain (1 - 3)  aluminum hydroxide/magnesium hydroxide/simethicone Suspension 30 milliLiter(s) Oral every 4 hours PRN Dyspepsia  bisacodyl 5 milliGRAM(s) Oral daily PRN Constipation  diazepam    Tablet 2 milliGRAM(s) Oral every 6 hours PRN Bladder spasm  HYDROmorphone  Injectable 0.5 milliGRAM(s) IV Push every 6 hours PRN Moderate Pain (4 - 6)  HYDROmorphone  Injectable 1 milliGRAM(s) IV Push every 6 hours PRN Severe Pain (7 - 10)  melatonin 3 milliGRAM(s) Oral at bedtime PRN Insomnia      [REVIEW OF SYSTEMS: ]  CONSTITUTIONAL: normal, no fever, no shakes, no chills   EYES: No eye pain, no visual disturbances, no discharge  ENMT:  no discharge  NECK: No pain, no stiffness  BREASTS: No pain, no masses, no nipple discharge  RESPIRATORY: No cough, no wheezing, no chills, no hemoptysis; No shortness of breath  CARDIOVASCULAR: No chest pain, no palpitations, no dizziness, no leg swelling  GASTROINTESTINAL: No abdominal, no epigastric pain. No nausea, no vomiting, no hematemesis; No diarrhea , no constipation. No melena, no hematochezia.  GENITOURINARY: No dysuria, no frequency, no hematuria, no incontinence  NEUROLOGICAL: No headaches, no memory loss, no loss of strength, no numbness, no tremors  SKIN: No itching, no burning, no rashes, no lesions   LYMPH NODES: No enlarged glands  ENDOCRINE: No heat or cold intolerance; No hair loss  MUSCULOSKELETAL: No joint pain or swelling; No muscle, no back, no extremity pain  PSYCHIATRIC: No depression, no anxiety, no mood swings, no difficulty sleeping  HEME/LYMPH: No easy bruising, no bleeding gums      [VITALS SIGNS 24hrs]  Vital Signs Last 24 Hrs  T(C): 37.2 (11 Jun 2025 12:12), Max: 37.2 (11 Jun 2025 12:12)  T(F): 99 (11 Jun 2025 12:12), Max: 99 (11 Jun 2025 12:12)  HR: 73 (11 Jun 2025 12:12) (65 - 76)  BP: 131/77 (11 Jun 2025 12:12) (130/79 - 152/88)  BP(mean): --  RR: 18 (11 Jun 2025 12:12) (17 - 20)  SpO2: 96% (11 Jun 2025 12:12) (96% - 100%)    Parameters below as of 11 Jun 2025 12:12  Patient On (Oxygen Delivery Method): room air      Daily Height in cm: 185.42 (10 Jose 2025 20:01)    Daily     I&O's Summary    10 Jose 2025 07:01  -  11 Jun 2025 07:00  --------------------------------------------------------  IN: 3000 mL / OUT: 6500 mL / NET: -3500 mL    11 Jun 2025 07:01  -  11 Jun 2025 17:22  --------------------------------------------------------  IN: 4050 mL / OUT: 7825 mL / NET: -3775 mL      [PHYSICAL EXAM]  GEN:   HEENT: normocephalic and atraumatic. EOMI. PERRL.    NECK: Supple.  No lymphadenopathy   LUNGS: Clear to auscultation.  HEART: S1S2 Regular rate and rhythm, no MRG  ABDOMEN: Soft, nontender, and nondistended.  Positive bowel sounds.    : No CVA tenderness  EXTREMITIES: Without edema.  NEUROLOGIC: grossly intact.  PSYCHIATRIC: Appropriate affect .  SKIN: No rash       [LABS: ]                        11.7   9.98  )-----------( 377      ( 11 Jun 2025 12:30 )             35.0     CBC Full  -  ( 11 Jun 2025 12:30 )  WBC Count : 9.98 K/uL  RBC Count : 3.74 M/uL  Hemoglobin : 11.7 g/dL  Hematocrit : 35.0 %  Platelet Count - Automated : 377 K/uL  Mean Cell Volume : 93.6 fl  Mean Cell Hemoglobin : 31.3 pg  Mean Cell Hemoglobin Concentration : 33.4 g/dL  Auto Neutrophil # : x  Auto Lymphocyte # : x  Auto Monocyte # : x  Auto Eosinophil # : x  Auto Basophil # : x  Auto Neutrophil % : x  Auto Lymphocyte % : x  Auto Monocyte % : x  Auto Eosinophil % : x  Auto Basophil % : x    06-11    139  |  105  |  10  ----------------------------<  115[H]  4.0   |  31  |  1.30    Ca    9.2      11 Jun 2025 12:30  Phos  2.8     06-11  Mg     1.9     06-11    TPro  6.9  /  Alb  3.5  /  TBili  0.4  /  DBili  x   /  AST  16  /  ALT  22  /  AlkPhos  55  06-11    LIVER FUNCTIONS - ( 11 Jun 2025 12:30 )  Alb: 3.5 g/dL / Pro: 6.9 g/dL / ALK PHOS: 55 U/L / ALT: 22 U/L / AST: 16 U/L / GGT: x           Urinalysis Basic - ( 11 Jun 2025 12:30 )  Color: x / Appearance: x / SG: x / pH: x  Gluc: 115 mg/dL / Ketone: x  / Bili: x / Urobili: x   Blood: x / Protein: x / Nitrite: x   Leuk Esterase: x / RBC: x / WBC x   Sq Epi: x / Non Sq Epi: x / Bacteria: x      CBC TREND (5 Days)  WBC Count: 9.98 K/uL (06-11 @ 12:30)  WBC Count: 11.95 K/uL (06-10 @ 22:35)  Hemoglobin: 11.7 g/dL (06-11 @ 12:30)  Hemoglobin: 11.9 g/dL (06-10 @ 22:35)  Hematocrit: 35.0 % (06-11 @ 12:30)  Hematocrit: 36.2 % (06-10 @ 22:35)  Platelet Count - Automated: 377 K/uL (06-11 @ 12:30)  Platelet Count - Automated: 399 K/uL (06-10 @ 22:35)       [MICROBIOLOGY /  VIROLOGY:]    Urinalysis with Rflx Culture (collected 06-11-25 @ 00:50)  Urinalysis with Rflx Culture (collected 11 Jun 2025 00:50)      [PATHOLOGY]       [RADIOLOGY & ADDITIONAL STUDIES:]     < from: CT Renal Stone Hunt (06.10.25 @ 22:52) >  ACC: [id number] EXAM:  CT RENAL STONE HUNT   ORDERED BY: TODD CARSON   PROCEDURE DATE:  06/10/2025    INTERPRETATION:  CLINICAL INFORMATION: Left lower quadrant pain, hematuria, recent ureteral stent placement  COMPARISON: 6/7/2025  CONTRAST/COMPLICATIONS:  IV Contrast: NONE  Oral Contrast: NONE.  PROCEDURE:  CT of the Abdomen and Pelvis was performed.  Sagittal and coronal reformats were performed.  FINDINGS:  LOWER CHEST: 3 mm right middle lobe pulmonary nodule (2/11) similar multilobulated cystic structure in the right epicardial space  LIVER: Within normal limits.  BILE DUCTS: Normal caliber.  GALLBLADDER: Within normal limits.  SPLEEN: Within normal limits.  PANCREAS: Within normal limits.  ADRENALS: Within normal limits.  KIDNEYS/URETERS: Similar mild to moderate left hydroureteronephrosis with high density intraluminal material, presumably blood products  No right hydronephrosis.  BLADDER: Dependent high density material, likely blood products, new compared to 6/11/2025  REPRODUCTIVE ORGANS: Prostate within normal limits.  BOWEL: No bowel obstruction. Appendix is not visualized.  PERITONEUM/RETROPERITONEUM: Within normal limits.  VESSELS: Within normal limits.  LYMPH NODES: No lymphadenopathy.  ABDOMINAL WALL: Within normal limits.  BONES: Degenerative changes.  IMPRESSION:  Similar mild to moderate left hydroureteronephrosis with high density intraluminal material within the renal collecting system and ureter, likely representing blood products.  High density dependent material within the urinary bladder, likely representing blood products, new compared to 6/7/2025. Underlying neoplasm is not definitively excluded.  --- End of Report ---  HAIM FISHER MD; Attending Radiologist  This document has been electronically signed. Jose 10 2025 11:39PM   Patient is a 40y old  Male who presents with a chief complaint of Hematuria, urinary retention (11 Jun 2025 16:51)    HPI:  Pt is a 41 y/o M with PMHx CAD, HLD, nephrolithiasis, GERD, factor IX deficiency, anxiety who presents with LLQ pain and passing blood clots in his urine. Pt had an unsuccessful lithotripsy on 5/22 and then subsequent stent placement 5/28. He now reports he began with LLQ pain, cramping, and hematuria earlier today, He became unable to urinate which he attributed to a clot, which prompted him to come in to be seen. He was recently diagnosed with factor IX deficiency, although is not on any treatment, home aspirin is on hold due to previous hematuria. Ibrahim placed in the ED with 800cc of dark urine, and CBI was started.    ED Course:   Vitals: BP: 144/93, HR: 76, Temp: 98.7, RR: 20, SpO2: 99% on RA  Labs:  WBC 11.95, H/H 11.9/36.2,   UA: large blood, moderate LE, 10 WBCs, occasional bacteria  CT:  Similar mild to moderate left hydroureteronephrosis with high density intraluminal material within the renal collecting system and ureter, likely representing blood products. High density dependent material within the urinary bladder, likely   representing blood products, new compared to 6/7/2025. Underlying neoplasm is not definitively excluded.  Received in the ED: rocephin, 1L NS bolus x2, ofirmev, dilaudid, morphine   (11 Jun 2025 06:00)      PAST MEDICAL & SURGICAL HISTORY:  CAD (coronary artery disease)  Hyperlipidemia  Anxiety  GERD (gastroesophageal reflux disease)  Nephrolithiasis  [place] teeth extracted       HEALTH ISSUES - PROBLEM Dx:  Urinary retention  CAD (coronary artery disease)  Need for prophylactic measure  Anxiety  GERD (gastroesophageal reflux disease)    Retention of urine [R33.9]  CAD (coronary artery disease) [I25.10]  Hyperlipidemia [E78.5]  Anxiety [F41.9]  GERD (gastroesophageal reflux disease) [K21.9]  Nephrolithiasis [N20.0]  No significant past surgical history [[id number]]  [place] teeth extracted [K08.409]  Acute UTI [N39.0]  CAD (coronary artery disease) [I25.10]      FAMILY HISTORY:  FH: myocardial infarction (Mother)  FH: multiple sclerosis (Sibling)  1 of 2 children  1 patient  2 sister. --3yrs. MS    has 3 childrens  1 son 13yo  2 dtr 9yo  3 dtr 9yo      [SOCIAL HISTORY: ]     smoking:  none currently     EtOH:  none currently     illicit drugs:  none currently     occupation:  Retired     marital status:  , 3 children     Other:       [ALLERGIES/INTOLERANCES:]  Allergies     No Known Allergies  Intolerances      [MEDICATIONS]  MEDICATIONS  (STANDING):  cefTRIAXone   IVPB 1000 milliGRAM(s) IV Intermittent every 24 hours  escitalopram 10 milliGRAM(s) Oral daily  naloxone Injectable 0.4 milliGRAM(s) IV Push once  pantoprazole    Tablet 40 milliGRAM(s) Oral before breakfast  polyethylene glycol 3350 17 Gram(s) Oral daily  rosuvastatin 20 milliGRAM(s) Oral at bedtime  senna 2 Tablet(s) Oral at bedtime  sodium chloride 0.9%. 1000 milliLiter(s) (100 mL/Hr) IV Continuous <Continuous>      MEDICATIONS  (PRN):  acetaminophen     Tablet .. 650 milliGRAM(s) Oral every 6 hours PRN Temp greater or equal to 38C (100.4F), Mild Pain (1 - 3)  aluminum hydroxide/magnesium hydroxide/simethicone Suspension 30 milliLiter(s) Oral every 4 hours PRN Dyspepsia  bisacodyl 5 milliGRAM(s) Oral daily PRN Constipation  diazepam    Tablet 2 milliGRAM(s) Oral every 6 hours PRN Bladder spasm  HYDROmorphone  Injectable 0.5 milliGRAM(s) IV Push every 6 hours PRN Moderate Pain (4 - 6)  HYDROmorphone  Injectable 1 milliGRAM(s) IV Push every 6 hours PRN Severe Pain (7 - 10)  melatonin 3 milliGRAM(s) Oral at bedtime PRN Insomnia      [REVIEW OF SYSTEMS: ]  CONSTITUTIONAL: normal, no fever, no shakes, no chills   EYES: No eye pain, no visual disturbances, no discharge  ENMT:  no discharge  NECK: No pain, no stiffness  BREASTS: No pain, no masses, no nipple discharge  RESPIRATORY: No cough, no wheezing, no chills, no hemoptysis; No shortness of breath  CARDIOVASCULAR: No chest pain, no palpitations, no dizziness, no leg swelling  GASTROINTESTINAL: No abdominal, no epigastric pain. No nausea, no vomiting, no hematemesis; No diarrhea , no constipation. No melena, no hematochezia.  GENITOURINARY: No dysuria, no frequency, no hematuria, no incontinence  NEUROLOGICAL: No headaches, no memory loss, no loss of strength, no numbness, no tremors  SKIN: No itching, no burning, no rashes, no lesions   LYMPH NODES: No enlarged glands  ENDOCRINE: No heat or cold intolerance; No hair loss  MUSCULOSKELETAL: No joint pain or swelling; No muscle, no back, no extremity pain  PSYCHIATRIC: No depression, no anxiety, no mood swings, no difficulty sleeping  HEME/LYMPH: No easy bruising, no bleeding gums      [VITALS SIGNS 24hrs]  Vital Signs Last 24 Hrs  T(C): 37.2 (11 Jun 2025 12:12), Max: 37.2 (11 Jun 2025 12:12)  T(F): 99 (11 Jun 2025 12:12), Max: 99 (11 Jun 2025 12:12)  HR: 73 (11 Jun 2025 12:12) (65 - 76)  BP: 131/77 (11 Jun 2025 12:12) (130/79 - 152/88)  BP(mean): --  RR: 18 (11 Jun 2025 12:12) (17 - 20)  SpO2: 96% (11 Jun 2025 12:12) (96% - 100%)    Parameters below as of 11 Jun 2025 12:12  Patient On (Oxygen Delivery Method): room air      Daily Height in cm: 185.42 (10 Jose 2025 20:01)    Daily     I&O's Summary    10 Jose 2025 07:01  -  11 Jun 2025 07:00  --------------------------------------------------------  IN: 3000 mL / OUT: 6500 mL / NET: -3500 mL    11 Jun 2025 07:01  -  11 Jun 2025 17:22  --------------------------------------------------------  IN: 4050 mL / OUT: 7825 mL / NET: -3775 mL      [PHYSICAL EXAM]  GEN:   HEENT: normocephalic and atraumatic. EOMI. PERRL.    NECK: Supple.  No lymphadenopathy   LUNGS: Clear to auscultation.  HEART: S1S2 Regular rate and rhythm, no MRG  ABDOMEN: Soft, nontender, and nondistended.  Positive bowel sounds.    : No CVA tenderness  EXTREMITIES: Without edema.  NEUROLOGIC: grossly intact.  PSYCHIATRIC: Appropriate affect .  SKIN: No rash       [LABS: ]                        11.7   9.98  )-----------( 377      ( 11 Jun 2025 12:30 )             35.0     CBC Full  -  ( 11 Jun 2025 12:30 )  WBC Count : 9.98 K/uL  RBC Count : 3.74 M/uL  Hemoglobin : 11.7 g/dL  Hematocrit : 35.0 %  Platelet Count - Automated : 377 K/uL  Mean Cell Volume : 93.6 fl  Mean Cell Hemoglobin : 31.3 pg  Mean Cell Hemoglobin Concentration : 33.4 g/dL  Auto Neutrophil # : x  Auto Lymphocyte # : x  Auto Monocyte # : x  Auto Eosinophil # : x  Auto Basophil # : x  Auto Neutrophil % : x  Auto Lymphocyte % : x  Auto Monocyte % : x  Auto Eosinophil % : x  Auto Basophil % : x    06-11    139  |  105  |  10  ----------------------------<  115[H]  4.0   |  31  |  1.30    Ca    9.2      11 Jun 2025 12:30  Phos  2.8     06-11  Mg     1.9     06-11    TPro  6.9  /  Alb  3.5  /  TBili  0.4  /  DBili  x   /  AST  16  /  ALT  22  /  AlkPhos  55  06-11    LIVER FUNCTIONS - ( 11 Jun 2025 12:30 )  Alb: 3.5 g/dL / Pro: 6.9 g/dL / ALK PHOS: 55 U/L / ALT: 22 U/L / AST: 16 U/L / GGT: x           Urinalysis Basic - ( 11 Jun 2025 12:30 )  Color: x / Appearance: x / SG: x / pH: x  Gluc: 115 mg/dL / Ketone: x  / Bili: x / Urobili: x   Blood: x / Protein: x / Nitrite: x   Leuk Esterase: x / RBC: x / WBC x   Sq Epi: x / Non Sq Epi: x / Bacteria: x      CBC TREND (5 Days)  WBC Count: 9.98 K/uL (06-11 @ 12:30)  WBC Count: 11.95 K/uL (06-10 @ 22:35)  Hemoglobin: 11.7 g/dL (06-11 @ 12:30)  Hemoglobin: 11.9 g/dL (06-10 @ 22:35)  Hematocrit: 35.0 % (06-11 @ 12:30)  Hematocrit: 36.2 % (06-10 @ 22:35)  Platelet Count - Automated: 377 K/uL (06-11 @ 12:30)  Platelet Count - Automated: 399 K/uL (06-10 @ 22:35)       [MICROBIOLOGY /  VIROLOGY:]    Urinalysis with Rflx Culture (collected 06-11-25 @ 00:50)  Urinalysis with Rflx Culture (collected 11 Jun 2025 00:50)      [PATHOLOGY]       [RADIOLOGY & ADDITIONAL STUDIES:]     < from: CT Renal Stone Hunt (06.10.25 @ 22:52) >  ACC: [id number] EXAM:  CT RENAL STONE HUNT   ORDERED BY: TODD CARSON   PROCEDURE DATE:  06/10/2025    INTERPRETATION:  CLINICAL INFORMATION: Left lower quadrant pain, hematuria, recent ureteral stent placement  COMPARISON: 6/7/2025  CONTRAST/COMPLICATIONS:  IV Contrast: NONE  Oral Contrast: NONE.  PROCEDURE:  CT of the Abdomen and Pelvis was performed.  Sagittal and coronal reformats were performed.  FINDINGS:  LOWER CHEST: 3 mm right middle lobe pulmonary nodule (2/11) similar multilobulated cystic structure in the right epicardial space  LIVER: Within normal limits.  BILE DUCTS: Normal caliber.  GALLBLADDER: Within normal limits.  SPLEEN: Within normal limits.  PANCREAS: Within normal limits.  ADRENALS: Within normal limits.  KIDNEYS/URETERS: Similar mild to moderate left hydroureteronephrosis with high density intraluminal material, presumably blood products  No right hydronephrosis.  BLADDER: Dependent high density material, likely blood products, new compared to 6/11/2025  REPRODUCTIVE ORGANS: Prostate within normal limits.  BOWEL: No bowel obstruction. Appendix is not visualized.  PERITONEUM/RETROPERITONEUM: Within normal limits.  VESSELS: Within normal limits.  LYMPH NODES: No lymphadenopathy.  ABDOMINAL WALL: Within normal limits.  BONES: Degenerative changes.  IMPRESSION:  Similar mild to moderate left hydroureteronephrosis with high density intraluminal material within the renal collecting system and ureter, likely representing blood products.  High density dependent material within the urinary bladder, likely representing blood products, new compared to 6/7/2025. Underlying neoplasm is not definitively excluded.  --- End of Report ---  HAIM FISHER MD; Attending Radiologist  This document has been electronically signed. Jose 10 2025 11:39PM    < from: CT Angio Chest PE Protocol w/ IV Cont (02.27.25 @ 18:04) >    ACC: [id number] EXAM:  CT ABDOMEN AND PELVIS IC   ORDERED BY: TYRELL AMOS     ACC: [id number] EXAM:  CT ANGIO CHEST PULM ART WAWIC   ORDERED BY:   TYRELL AMOS     PROCEDURE DATE:  02/27/2025          INTERPRETATION:  CLINICAL INFORMATION: Chest pain. Abdominal pain.    COMPARISON: Chest radiograph 2/27/2025.    CONTRAST/COMPLICATIONS:  IV Contrast: IV contrast documented in unlinked concurrent exam   (accession [id number]), Omnipaque 350 (accession [id number])  92 cc   administered   8 cc discarded  Oral Contrast: NONE  .    PROCEDURE:  CT Angiography of the Chest was performed followed by portal venous phase   imaging of the Abdomen and Pelvis.  Sagittal and coronal reformats were performed as well as 3D (MIP)   reconstructions.    FINDINGS:  CHEST:  LUNGS AND LARGE AIRWAYS: Patent central airways. Few scattered pulmonary   micronodules. Reference: Right middle lobe nodule (5-103), 0.2 cm. Right   lower lobe subpleural nodule (5-91), 0.2 cm.  PLEURA: No pleural effusion.  VESSELS: Within normal limits. Adequate contrast opacification of the   pulmonary arterial tree without evidence of main, lobar, or segmental   pulmonary embolism. Evaluation of many of the subsegmental arteries is   limited secondary to poor opacification.  HEART: Heart size is normal. No pericardial effusion.  MEDIASTINUM AND ANDRZEJ: No lymphadenopathy.  CHEST WALL AND LOWER NECK: Within normal limits.    ABDOMEN AND PELVIS:  LIVER: Subcentimeter hypoattenuating hepatic foci, too small to   characterize.  BILE DUCTS: Normal caliber.  GALLBLADDER: Within normal limits.  SPLEEN: Within normal limits.  PANCREAS: Within normal limits.  ADRENALS: Within normal limits.  KIDNEYS/URETERS: Bilateral symmetric renal enhancement. No   hydronephrosis. Bilateral nonobstructing renal calculi, largest 0.9 cm in   the left renal lower pole.    BLADDER: Within normal limits.  REPRODUCTIVE ORGANS: Prostate is mildly enlarged.    BOWEL: No bowel obstruction. Appendix is normal.  PERITONEUM/RETROPERITONEUM: Within normal limits.  VESSELS: Within normal limits.  LYMPH NODES: No lymphadenopathy.  ABDOMINAL WALL: Within normal limits.  BONES: Degenerative changes.    IMPRESSION:  No pulmonary embolism.  No hydronephrosis. Bilateral nonobstructing renal calculi, largest 0.9 cm in the left renal lower pole.  --- End of Report ---   RY SANCHEZ MD; Attending Radiologist  This document has been electronically signed. Feb 27 2025  6:34PM  < end of copied text >

## 2025-06-11 NOTE — ED ADULT NURSE REASSESSMENT NOTE - NSFALLHARMRISKINTERV_ED_ALL_ED

## 2025-06-11 NOTE — H&P ADULT - HISTORY OF PRESENT ILLNESS
Pt is a 39 y/o M with PMHx CAD, HLD, nephrolithiasis, GERD, factor IX deficiency, anxiety who presents with LLQ pain and passing blood clots in his urine. Pt had an unsuccessful lithotripsy on 5/22 and then subsequent stent placement 5/28.    ED Course:   Vitals: BP: 144/93, HR: 76, Temp: 98.7, RR: 20, SpO2: 99% on RA  Labs:  WBC 11.95, H/H 11.9/36.2,   UA: large blood, moderate LE, 10 WBCs, occasional bacteria  CT:  Similar mild to moderate left hydroureteronephrosis with high density intraluminal material within the renal collecting system and ureter, likely representing blood products. High density dependent material within the urinary bladder, likely   representing blood products, new compared to 6/7/2025. Underlying neoplasm is not definitively excluded.  Received in the ED: rocephin, 1L NS bolus x2, ofirmev, dilaudid, morphine   Pt is a 41 y/o M with PMHx CAD, HLD, nephrolithiasis, GERD, factor IX deficiency, anxiety who presents with LLQ pain and passing blood clots in his urine. Pt had an unsuccessful lithotripsy on 5/22 and then subsequent stent placement 5/28. He now reports he began with LLQ pain, cramping, and hematuria earlier today, He became unable to urinate which he attributed to a clot, which prompted him to come in to be seen. He was recently diagnosed with factor IX deficiency, although is not on any treatment, home aspirin is on hold due to previous hematuria. Ibrahim placed in the ED with 800cc of dark urine, and CBI was started.    ED Course:   Vitals: BP: 144/93, HR: 76, Temp: 98.7, RR: 20, SpO2: 99% on RA  Labs:  WBC 11.95, H/H 11.9/36.2,   UA: large blood, moderate LE, 10 WBCs, occasional bacteria  CT:  Similar mild to moderate left hydroureteronephrosis with high density intraluminal material within the renal collecting system and ureter, likely representing blood products. High density dependent material within the urinary bladder, likely   representing blood products, new compared to 6/7/2025. Underlying neoplasm is not definitively excluded.  Received in the ED: rocephin, 1L NS bolus x2, ofirmev, dilaudid, morphine

## 2025-06-11 NOTE — CARE COORDINATION ASSESSMENT. - NSPASTMEDSURGHISTORY_GEN_ALL_CORE_FT
PAST MEDICAL & SURGICAL HISTORY:  CAD (coronary artery disease)      Nephrolithiasis      GERD (gastroesophageal reflux disease)      Anxiety      Hyperlipidemia      [place] teeth extracted

## 2025-06-11 NOTE — PROGRESS NOTE ADULT - SUBJECTIVE AND OBJECTIVE BOX
UROLOGY DAILY PROGRESS NOTE:     Subjective: Patient seen and examined at bedside. On CBI.  States his urine is getting "lighter"  Also reports spoke w Dr. 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      Objective:  Vital signs  T(F): , Max: 98.7 (06-10-25 @ 20:01)  HR: 70 (06-11-25 @ 07:55)  BP: 130/79 (06-11-25 @ 07:55)  SpO2: 98% (06-11-25 @ 07:55)  Wt(kg): --    I&O's Summary    10 Jose 2025 07:01  -  11 Jun 2025 07:00  --------------------------------------------------------  IN: 3000 mL / OUT: 6500 mL / NET: -3500 mL    11 Jun 2025 07:01  -  11 Jun 2025 09:12  --------------------------------------------------------  IN: 0 mL / OUT: 1200 mL / NET: -1200 mL        Gen: NAD  Pulm: No respiratory distress, no subcostal retractions  CV: RRR, no JVD  Abd: Soft, ND, mild tenderness LLQ, no rebound, no guarding  : On CBI with light cranberry colored urine in collection bag    Labs:  06-10  11.95 / 36.2  /1.30                           11.9   11.95 )-----------( 399      ( 10 Jose 2025 22:35 )             36.2     06-10    141  |  104  |  15  ----------------------------<  103[H]  5.0   |  29  |  1.30    Ca    9.3      10 Jose 2025 22:35    TPro  7.7  /  Alb  3.8  /  TBili  0.6  /  DBili  x   /  AST  37  /  ALT  28  /  AlkPhos  49  06-10           UROLOGY DAILY PROGRESS NOTE:     Subjective: Patient seen and examined at bedside. On CBI.  States his urine is getting "lighter"  Also reports spoke w Dr. 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      Objective:  Vital signs  T(F): , Max: 98.7 (06-10-25 @ 20:01)  HR: 70 (06-11-25 @ 07:55)  BP: 130/79 (06-11-25 @ 07:55)  SpO2: 98% (06-11-25 @ 07:55)  Wt(kg): --    I&O's Summary    10 Jose 2025 07:01  -  11 Jun 2025 07:00  --------------------------------------------------------  IN: 3000 mL / OUT: 6500 mL / NET: -3500 mL    11 Jun 2025 07:01  -  11 Jun 2025 09:12  --------------------------------------------------------  IN: 0 mL / OUT: 1200 mL / NET: -1200 mL      Exam:  Gen: NAD  Pulm: No respiratory distress, no subcostal retractions  CV: RRR, no JVD  Abd: Soft, ND, mild tenderness LLQ, no rebound, no guarding  : On CBI with light cranberry colored urine in collection bag    Labs:  06-10  11.95 / 36.2  /1.30                           11.9   11.95 )-----------( 399      ( 10 Jose 2025 22:35 )             36.2     06-10    141  |  104  |  15  ----------------------------<  103[H]  5.0   |  29  |  1.30    Ca    9.3      10 Jose 2025 22:35    TPro  7.7  /  Alb  3.8  /  TBili  0.6  /  DBili  x   /  AST  37  /  ALT  28  /  AlkPhos  49  06-10    Urine Microscopic-Add On (NC) (06.11.25 @ 00:50)    White Blood Cell - Urine: 10 /HPF   Red Blood Cell - Urine: 62 /HPF   Bacteria: Occasional /HPF   Squamous Epithelial Cells: Present    Urinalysis with Rflx Culture (06.11.25 @ 00:50)    Urine Appearance: Clear   Color: Red   Specific Gravity: 1.004   pH Urine: 7.0   Protein, Urine: 100 mg/dL   Glucose Qualitative, Urine: Negative mg/dL   Ketone , Urine: Negative mg/dL   Blood, Urine: Large   Bilirubin: Negative   Urobilinogen: 0.2 mg/dL   Leukocyte Esterase Concentration: Moderate   Nitrite: Negative    UCx 6/11/25: Received and pend       UROLOGY DAILY PROGRESS NOTE:     Subjective: Patient seen and examined at bedside. On CBI.  States his urine is getting "lighter"  Also reports spoke w Dr. 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      Objective:  Vital signs  T(F): , Max: 98.7 (06-10-25 @ 20:01)  HR: 70 (06-11-25 @ 07:55)  BP: 130/79 (06-11-25 @ 07:55)  SpO2: 98% (06-11-25 @ 07:55)  Wt(kg): --    I&O's Summary    10 Jose 2025 07:01  -  11 Jun 2025 07:00  --------------------------------------------------------  IN: 3000 mL / OUT: 6500 mL / NET: -3500 mL    11 Jun 2025 07:01  -  11 Jun 2025 09:12  --------------------------------------------------------  IN: 0 mL / OUT: 1200 mL / NET: -1200 mL      Exam:  Gen: NAD  Pulm: No respiratory distress, no subcostal retractions  CV: RRR, no JVD  Abd: Soft, ND, mild tenderness LLQ, no rebound, no guarding  : On CBI with light cranberry colored urine in collection bag    Labs:  06-10  11.95 / 36.2  /1.30                           11.9   11.95 )-----------( 399      ( 10 Jose 2025 22:35 )             36.2     06-10    141  |  104  |  15  ----------------------------<  103[H]  5.0   |  29  |  1.30    Ca    9.3      10 Jose 2025 22:35    TPro  7.7  /  Alb  3.8  /  TBili  0.6  /  DBili  x   /  AST  37  /  ALT  28  /  AlkPhos  49  06-10    Urine Microscopic-Add On (NC) (06.11.25 @ 00:50)    White Blood Cell - Urine: 10 /HPF   Red Blood Cell - Urine: 62 /HPF   Bacteria: Occasional /HPF   Squamous Epithelial Cells: Present    Urinalysis with Rflx Culture (06.11.25 @ 00:50)    Urine Appearance: Clear   Color: Red   Specific Gravity: 1.004   pH Urine: 7.0   Protein, Urine: 100 mg/dL   Glucose Qualitative, Urine: Negative mg/dL   Ketone , Urine: Negative mg/dL   Blood, Urine: Large   Bilirubin: Negative   Urobilinogen: 0.2 mg/dL   Leukocyte Esterase Concentration: Moderate   Nitrite: Negative    UCx 6/11/25: Received and pend

## 2025-06-11 NOTE — DISCHARGE NOTE PROVIDER - NSDCMRMEDTOKEN_GEN_ALL_CORE_FT
acetaminophen 325 mg oral tablet: 2 tab(s) orally every 6 hours As needed Temp greater or equal to 38C (100.4F), Mild Pain (1 - 3)  aluminum hydroxide-magnesium hydroxide 200 mg-200 mg/5 mL oral suspension: 30 milliliter(s) orally every 4 hours As needed Dyspepsia  bisacodyl 5 mg oral delayed release tablet: 1 tab(s) orally once a day As needed Constipation  cefTRIAXone: 1 gram(s) intravenous once a day  diazePAM 2 mg oral tablet: 1 tab(s) orally every 6 hours As needed Bladder spasm  escitalopram 10 mg oral tablet: 1 tab(s) orally once a day (in the morning)  HYDROmorphone: 1 milligram(s) intravenous every 6 hours as needed for  severe pain  melatonin 3 mg oral tablet: 1 tab(s) orally once a day (at bedtime) As needed Insomnia  pantoprazole 40 mg oral delayed release tablet: 1 tab(s) orally once a day (in the morning)  polyethylene glycol 3350 oral powder for reconstitution: 17 gram(s) orally once a day  rosuvastatin 20 mg oral tablet: 1 tab(s) orally once a day (at bedtime)  senna leaf extract oral tablet: 2 tab(s) orally once a day (at bedtime)   acetaminophen 325 mg oral tablet: 2 tab(s) orally every 6 hours As needed Temp greater or equal to 38C (100.4F), Mild Pain (1 - 3)  aluminum hydroxide-magnesium hydroxide 200 mg-200 mg/5 mL oral suspension: 30 milliliter(s) orally every 4 hours As needed Dyspepsia  bisacodyl 5 mg oral delayed release tablet: 1 tab(s) orally once a day As needed Constipation  cefTRIAXone: 1 gram(s) intravenous once a day  diazePAM 2 mg oral tablet: 1 tab(s) orally every 6 hours As needed Bladder spasm  escitalopram 10 mg oral tablet: 1 tab(s) orally once a day (in the morning)  HYDROmorphone: 1 milligram(s) intravenous every 4 hours as needed for  severe pain  melatonin 3 mg oral tablet: 1 tab(s) orally once a day (at bedtime) As needed Insomnia  pantoprazole 40 mg oral delayed release tablet: 1 tab(s) orally once a day (in the morning)  polyethylene glycol 3350 oral powder for reconstitution: 17 gram(s) orally once a day  rosuvastatin 20 mg oral tablet: 1 tab(s) orally once a day (at bedtime)  senna leaf extract oral tablet: 2 tab(s) orally once a day (at bedtime)

## 2025-06-11 NOTE — DISCHARGE NOTE PROVIDER - CARE PROVIDER_API CALL
Ez Mendenhall  Urology  [address] [place] [id number]  Phone: [phone]  Fax: [phone]  Follow Up Time: 2 weeks

## 2025-06-11 NOTE — H&P ADULT - PROBLEM SELECTOR PLAN 2
Chronic  - pt usually on aspirin however was held due to hematuria  - continue to hold aspirin  - continue home statin  - f/u lipid panel

## 2025-06-11 NOTE — PROGRESS NOTE ADULT - NSPROGADDITIONALINFOA_GEN_ALL_CORE
I agree with assessment and plan. Discussed with patient need for cystoscopy, ureteroscopy and clot removal vs stent placement. Hematology recommending Factor IX infusion prior to any further intervention. Patient agrees with transfer to [facility] for procedure and medical therapy.

## 2025-06-11 NOTE — H&P ADULT - PROBLEM SELECTOR PLAN 1
Pt presenting with urinary retention in setting of hematuria with clots  - VSS on arrival  - labs significant for  WBC 11.95, H/H 11.9/36.2  - UA: large blood, moderate LE, 10 WBCs, occasional bacteria  - CT:  Similar mild to moderate left hydroureteronephrosis with high density intraluminal material within the renal collecting system and ureter, likely representing blood products. High density dependent material within the urinary bladder, likely representing blood products, new compared to 6/7/2025. Underlying neoplasm is not definitively excluded.  - s/p rocephin, 1L NS bolus x2, ofirmev, dilaudid, morphine in the ED  - pt recently diagnosed with factor IX deficiency, not yet on treatment  - newman placed in ED and CBI started - will continue as per urology recs  - on mIVF @ 100cc/hr - continue  - s/p 1 dose of rocephin in ED, will continue given UA with WBCs and mod LE  - pain control with tylenol 650mg for mild, dilaudid 0.5 for moderate, dilaudid 1mg for severe pain  - bowel regimen  - f/u urine cx  - monitor daily cbc  - urology consulted in ED, recs appreciately Pt presenting with urinary retention in setting of hematuria with clots  - VSS on arrival  - labs significant for WBC 11.95, H/H 11.9/36.2  - UA: large blood, moderate LE, 10 WBCs, occasional bacteria  - CT:  Similar mild to moderate left hydroureteronephrosis with high density intraluminal material within the renal collecting system and ureter, likely representing blood products. High density dependent material within the urinary bladder, likely representing blood products, new compared to 6/7/2025. Underlying neoplasm is not definitively excluded.  - s/p rocephin, 1L NS bolus x2, ofirmev, dilaudid, morphine in the ED  - pt recently diagnosed with factor IX deficiency, not yet on treatment  - newman placed in ED and CBI started - will continue as per urology recs  - on mIVF @ 100cc/hr - continue  - s/p 1 dose of rocephin in ED, will continue given UA with WBCs and mod LE  - pain control with tylenol 650mg for mild, dilaudid 0.5 for moderate, dilaudid 1mg for severe pain  - bowel regimen  - f/u urine cx  - anemia likely 2/2 hematuria - monitor daily cbc, will check iron studies  - urology consulted in ED, recs appreciately Pt presenting with urinary retention in setting of hematuria with clots  - VSS on arrival  - labs significant for WBC 11.95, H/H 11.9/36.2  - UA: large blood, moderate LE, 10 WBCs, occasional bacteria  - CT:  Similar mild to moderate left hydroureteronephrosis with high density intraluminal material within the renal collecting system and ureter, likely representing blood products. High density dependent material within the urinary bladder, likely representing blood products, new compared to 6/7/2025. Underlying neoplasm is not definitively excluded.  - s/p rocephin, 1L NS bolus x2, ofirmev, dilaudid, morphine in the ED  - pt recently diagnosed with factor IX deficiency, not yet on treatment  - newman placed in ED and CBI started - will continue as per urology recs  - on mIVF @ 100cc/hr - continue  - s/p 1 dose of rocephin in ED, will continue given UA with WBCs and mod LE  - pain control with tylenol 650mg for mild, dilaudid 0.5 for moderate, dilaudid 1mg for severe pain  - bowel regimen  - f/u urine cx  - anemia likely 2/2 hematuria - monitor daily cbc, will check iron studies  - urology consulted in ED, recs appreciated

## 2025-06-11 NOTE — CARE COORDINATION ASSESSMENT. - OTHER PERTINENT DISCHARGE PLANNING INFORMATION:
Met with patient at bedside to discuss the role of case management with verbalized understanding.  Patient seen in ED and anticipate inpatient admission for CBI, currently with newman and CBI.  Patient states he resides home with wife and children, denies prior DME or [facility] services as well as any history of newman catheter.  Will monitor for transition needs and remain available.  PCP Dr Real Turner

## 2025-06-11 NOTE — H&P ADULT - ASSESSMENT
Pt is a 39 y/o M with PMHx CAD, HLD, nephrolithiasis, GERD who presents with LLQ pain and passing blood clots in his urine, admitted with hematuria and urinary retention.

## 2025-06-11 NOTE — PROGRESS NOTE ADULT - ASSESSMENT
39 y/o M with PMHx CAD, HLD, nephrolithiasis, GERD who presents with LLQ pain and passing blood clots in his urine, admitted with hematuria and urinary retention. on CBI     recurrent hematuria    - urology recommended cystoscopy and ureteroscopy with possible clot evacuation    - will need hematology clearance. discussed with heme, recommend patient to be transferred to [facility] given history of hemophilia B as we do not have ability to manage patient here. Spoke with transfer center. patient accepted for transfer.     - PRICILLA during last admission appears to be stable. Monitor hgb.     - CT with new clots noted in the bladder.     - on rocephin 1 gm daily     - pain control prn     CAD    - cont to hold aspirin     anxiety    - cont lexapro     GERD     - cont protonix     DVT proph: SCDs  discussed with urology and hematology.  transfer packet completed

## 2025-06-11 NOTE — H&P ADULT - NSHPPHYSICALEXAM_GEN_ALL_CORE
T(C): 36.8 (06-11-25 @ 04:00), Max: 37.1 (06-10-25 @ 20:01)  HR: 69 (06-11-25 @ 04:00) (65 - 76)  BP: 152/88 (06-11-25 @ 04:00) (135/83 - 152/88)  RR: 17 (06-11-25 @ 04:00) (17 - 20)  SpO2: 98% (06-11-25 @ 04:00) (98% - 100%)    GENERAL: no acute distress  HEENT: NCAT, EOMI  LUNGS: clear to auscultation, symmetric breath sounds, no wheezing or rhonchi appreciated  HEART: S1/S2, regular rate and rhythm, no murmurs noted, no lower extremity edema  GASTROINTESTINAL: abdomen is soft, nontender, nondistended, normoactive bowel sounds  INTEGUMENT: warm skin, appears well perfused  MUSCULOSKELETAL: no clubbing or cyanosis, no obvious deformity  NEUROLOGIC: awake and alert, answering questions and following commands appropriately  HEME/LYMPH: no obvious ecchymosis or petechiae

## 2025-06-11 NOTE — DISCHARGE NOTE PROVIDER - NSDCADMDATE_GEN_ALL_CORE_FT
Problem: Occupational Therapy Goal  Goal: Occupational Therapy Goal  Goals achieved for toileting, transfers, following commands and LB dressing.  Goals revised 10/19 to be addressed in 7 days, expiring: 10/26  Patient will demonstrate stand pivot transfers with modified independence.   Not met  Patient will demonstrate grooming while standing with modified independence.   Not met  Patient will demonstrate upper body dressing with modified independence.   Not met  Patient will demonstrate lower body dressing with modified independence.   Not met  Patient will demonstrate toileting with modified independence.   Not met  Patient will demonstrate bathing while seated EOB with modified independence.   Not met  Patient will demonstrate ability to follow 5/5 commands.   Not met  Patient's family / caregiver will demonstrate independence and safety with assisting patient with self-care skills and functional mobility.     Not met  Patient and/or patient's family will verbalize understanding of stroke prevention guidelines, personal risk factors and stroke warning signs via teachback method.  Not met          Goals revised.  JASPER Casas  10/19/2017           11-Jun-2025 06:04

## 2025-06-11 NOTE — CONSULT NOTE ADULT - SUBJECTIVE AND OBJECTIVE BOX
Urology Consult    Pt is a 39yo Male with PMHx anxiety, GERD, HLD, CAD, L kidney calcifications s/p cystoscopy, lithotripsy, with ureteral stricture dilation, stent placement (25, Dylan) with subsequent stent removal 25 presenting with complaint of spontaneous hematuria and acute urinary retention this evening. Of note patient recently presented to [place] ED on 25 with complaint of left sided cramping and hematuria with passage of clots. CTAP at the time was notable for potential blood products in the left renal pelvis,  left sided hydronephrosis, and findings consistent with obstructive uropathy. Patient also noted with PRICILLA with Cr of 1.61 which improved with IVF and normalized at 1.10. Hematuria was noted to improve and pt was voiding independently without issue and was ultimately discharged on .       Interval HPI:  Patient presents today complaining of constant LLQ abdominal cramping and hematuria that started this afternoon. He states symptoms feel similar to  when he last presented to ED but are now constant. He admits to clear red hematuria but denies passage of clots. He said he was unable to urinate and believed a clot was in the way which prompted him to come to ED. Ibrahim catheter was placed in ED and CBI was started. He denies any pain or burning with urination. He denies any suprapubic pain or discomfort. Denies fevers, chills, or back pain. Of note patient recently diagnosed with Factor IX deficiency but not currently receiving any treatment. Denies any clotting disorders. Patient previously on ASA but has not taken it since Friday as he was told to hold it in setting of hematuria.         PAST MEDICAL & SURGICAL HISTORY:  CAD (coronary artery disease)      Hyperlipidemia      Anxiety      GERD (gastroesophageal reflux disease)      Nephrolithiasis      [place] teeth extracted          MEDICATIONS  (STANDING):  HYDROmorphone  Injectable 0.5 milliGRAM(s) IV Push Once  sodium chloride 0.9%. 1000 milliLiter(s) (100 mL/Hr) IV Continuous <Continuous>    MEDICATIONS  (PRN):      Allergies    No Known Allergies    Intolerances        SOCIAL HISTORY: No illicit drug use    FAMILY HISTORY:  FH: myocardial infarction (Mother)    FH: multiple sclerosis (Sibling)        REVIEW OF SYSTEMS   [x] A ten-point review of systems was otherwise negative except as noted.  [ ] Due to altered mental status/intubation, subjective information were not able to be obtained from patient. History was obtained, to the extent possible, from review of the chart and collateral sources of information.    Vital Signs Last 24 Hrs  T(C): 36.8 (2025 00:59), Max: 37.1 (10 Jose 2025 20:01)  T(F): 98.2 (2025 00:59), Max: 98.7 (10 Jose 2025 20:01)  HR: 65 (2025 00:59) (65 - 76)  BP: 135/83 (2025 00:59) (135/83 - 144/93)  BP(mean): --  RR: 18 (2025 00:59) (18 - 20)  SpO2: 100% (:59) (99% - 100%)    Parameters below as of 2025 00:59  Patient On (Oxygen Delivery Method): room air        PHYSICAL EXAM:    GEN: NAD, awake and alert.  SKIN: Good color, non diaphoretic.  RESP: Non-labored breathing. No use of accessory muscles.  CARDIO: +S1/S2  ABDO: Soft, NT/ND, no palpable bladder, no suprapubic tenderness  BACK: No CVAT B/L  :  + Indwelling Ibrahim in place with CBI off draining fruit punch clear hematuria grade III, titrated to clear pink grade I-II hematuria on low rate CBI. No clots noted.   EXT: PEREZ x 4      I&O's Summary      LABS:                        11.9   11.95 )-----------( 399      ( 10 Jose 2025 22:35 )             36.2     06-10    141  |  104  |  15  ----------------------------<  103[H]  5.0   |  29  |  1.30    Ca    9.3      10 Jose 2025 22:35    TPro  7.7  /  Alb  3.8  /  TBili  0.6  /  DBili  x   /  AST  37  /  ALT  28  /  AlkPhos  49  06-10      Urinalysis Basic - ( 2025 00:50 )    Color: Red / Appearance: Clear / S.004 / pH: x  Gluc: x / Ketone: x  / Bili: Negative / Urobili: 0.2 mg/dL   Blood: x / Protein: 100 mg/dL / Nitrite: Negative   Leuk Esterase: Moderate / RBC: 62 /HPF / WBC 10 /HPF   Sq Epi: x / Non Sq Epi: x / Bacteria: Occasional /HPF        RADIOLOGY & ADDITIONAL STUDIES:  < from: CT Renal Stone Hunt (06.10.25 @ 22:52) >  ACC: [id number] EXAM:  CT RENAL STONE HUNT   ORDERED BY: TODD CARSON     PROCEDURE DATE:  06/10/2025          INTERPRETATION:  CLINICAL INFORMATION: Left lower quadrant pain,   hematuria, recent ureteral stent placement    COMPARISON: 2025    CONTRAST/COMPLICATIONS:  IV Contrast: NONE  Oral Contrast: NONE.    PROCEDURE:  CT of the Abdomen and Pelvis was performed.  Sagittal and coronal reformats were performed.    FINDINGS:  LOWER CHEST: 3 mm right middle lobe pulmonary nodule () similar   multilobulated cystic structure in the right epicardial space    LIVER: Within normal limits.  BILE DUCTS: Normal caliber.  GALLBLADDER: Within normal limits.  SPLEEN: Within normal limits.  PANCREAS: Within normal limits.  ADRENALS: Within normal limits.  KIDNEYS/URETERS: Similar mild to moderate left hydroureteronephrosis with   high density intraluminal material, presumably blood products  No right hydronephrosis.  BLADDER: Dependent high density material, likely blood products, new   compared to 2025  REPRODUCTIVE ORGANS: Prostate within normal limits.    BOWEL: No bowel obstruction. Appendix is not visualized.  PERITONEUM/RETROPERITONEUM: Within normal limits.  VESSELS: Within normal limits.  LYMPH NODES: No lymphadenopathy.  ABDOMINAL WALL: Within normal limits.  BONES: Degenerative changes.    IMPRESSION:  Similar mild to moderate left hydroureteronephrosis with high density   intraluminal material within the renal collecting system and ureter,   likely representing blood products.    High density dependent material within the urinary bladder, likely   representing blood products, new compared to 2025. Underlying   neoplasm is not definitively excluded.            --- End of Report ---            HAIM FISHER MD; Attending Radiologist  This document has been electronically signed. Jose 10 2025 11:39PM    < end of copied text >

## 2025-06-12 ENCOUNTER — INPATIENT (INPATIENT)
Facility: HOSPITAL | Age: 40
LOS: 3 days | Discharge: HOME | End: 2025-06-16
Attending: HOSPITALIST | Admitting: HOSPITALIST
Payer: COMMERCIAL

## 2025-06-12 VITALS
DIASTOLIC BLOOD PRESSURE: 82 MMHG | SYSTOLIC BLOOD PRESSURE: 131 MMHG | WEIGHT: 199.96 LBS | RESPIRATION RATE: 16 BRPM | OXYGEN SATURATION: 100 % | TEMPERATURE: 98 F | HEART RATE: 82 BPM | HEIGHT: 73 IN

## 2025-06-12 VITALS
OXYGEN SATURATION: 98 % | HEART RATE: 78 BPM | DIASTOLIC BLOOD PRESSURE: 86 MMHG | TEMPERATURE: 98 F | SYSTOLIC BLOOD PRESSURE: 131 MMHG | RESPIRATION RATE: 18 BRPM

## 2025-06-12 DIAGNOSIS — K08.409 PARTIAL LOSS OF TEETH, UNSPECIFIED CAUSE, UNSPECIFIED CLASS: Chronic | ICD-10-CM

## 2025-06-12 DIAGNOSIS — R31.0 GROSS HEMATURIA: ICD-10-CM

## 2025-06-12 LAB
ANION GAP SERPL CALC-SCNC: 4 MMOL/L — LOW (ref 5–17)
APTT BLD: 34.1 SEC — SIGNIFICANT CHANGE UP (ref 26.1–36.8)
BUN SERPL-MCNC: 10 MG/DL — SIGNIFICANT CHANGE UP (ref 7–23)
CALCIUM SERPL-MCNC: 9.1 MG/DL — SIGNIFICANT CHANGE UP (ref 8.5–10.1)
CHLORIDE SERPL-SCNC: 105 MMOL/L — SIGNIFICANT CHANGE UP (ref 96–108)
CO2 SERPL-SCNC: 29 MMOL/L — SIGNIFICANT CHANGE UP (ref 22–31)
CREAT SERPL-MCNC: 1.3 MG/DL — SIGNIFICANT CHANGE UP (ref 0.5–1.3)
EGFR: 70 ML/MIN/1.73M2 — SIGNIFICANT CHANGE UP
EGFR: 70 ML/MIN/1.73M2 — SIGNIFICANT CHANGE UP
GLUCOSE SERPL-MCNC: 85 MG/DL — SIGNIFICANT CHANGE UP (ref 70–99)
HCT VFR BLD CALC: 32.5 % — LOW (ref 39–50)
HGB BLD-MCNC: 11 G/DL — LOW (ref 13–17)
INR BLD: 1.22 RATIO — HIGH (ref 0.85–1.16)
MCHC RBC-ENTMCNC: 31.5 PG — SIGNIFICANT CHANGE UP (ref 27–34)
MCHC RBC-ENTMCNC: 33.8 G/DL — SIGNIFICANT CHANGE UP (ref 32–36)
MCV RBC AUTO: 93.1 FL — SIGNIFICANT CHANGE UP (ref 80–100)
NRBC BLD AUTO-RTO: 0 /100 WBCS — SIGNIFICANT CHANGE UP (ref 0–0)
PLATELET # BLD AUTO: 349 K/UL — SIGNIFICANT CHANGE UP (ref 150–400)
POTASSIUM SERPL-MCNC: 4.5 MMOL/L — SIGNIFICANT CHANGE UP (ref 3.5–5.3)
POTASSIUM SERPL-SCNC: 4.5 MMOL/L — SIGNIFICANT CHANGE UP (ref 3.5–5.3)
PROTHROM AB SERPL-ACNC: 14.3 SEC — HIGH (ref 9.9–13.4)
RBC # BLD: 3.49 M/UL — LOW (ref 4.2–5.8)
RBC # FLD: 11.7 % — SIGNIFICANT CHANGE UP (ref 10.3–14.5)
SODIUM SERPL-SCNC: 138 MMOL/L — SIGNIFICANT CHANGE UP (ref 135–145)
WBC # BLD: 9.85 K/UL — SIGNIFICANT CHANGE UP (ref 3.8–10.5)
WBC # FLD AUTO: 9.85 K/UL — SIGNIFICANT CHANGE UP (ref 3.8–10.5)

## 2025-06-12 PROCEDURE — 96375 TX/PRO/DX INJ NEW DRUG ADDON: CPT

## 2025-06-12 PROCEDURE — 84100 ASSAY OF PHOSPHORUS: CPT

## 2025-06-12 PROCEDURE — 85025 COMPLETE CBC W/AUTO DIFF WBC: CPT

## 2025-06-12 PROCEDURE — 85027 COMPLETE CBC AUTOMATED: CPT

## 2025-06-12 PROCEDURE — 96376 TX/PRO/DX INJ SAME DRUG ADON: CPT

## 2025-06-12 PROCEDURE — 80061 LIPID PANEL: CPT

## 2025-06-12 PROCEDURE — 96367 TX/PROPH/DG ADDL SEQ IV INF: CPT

## 2025-06-12 PROCEDURE — 83735 ASSAY OF MAGNESIUM: CPT

## 2025-06-12 PROCEDURE — 86850 RBC ANTIBODY SCREEN: CPT

## 2025-06-12 PROCEDURE — 87086 URINE CULTURE/COLONY COUNT: CPT

## 2025-06-12 PROCEDURE — 85730 THROMBOPLASTIN TIME PARTIAL: CPT

## 2025-06-12 PROCEDURE — 96365 THER/PROPH/DIAG IV INF INIT: CPT

## 2025-06-12 PROCEDURE — 86901 BLOOD TYPING SEROLOGIC RH(D): CPT

## 2025-06-12 PROCEDURE — 83540 ASSAY OF IRON: CPT

## 2025-06-12 PROCEDURE — 86900 BLOOD TYPING SEROLOGIC ABO: CPT

## 2025-06-12 PROCEDURE — 83550 IRON BINDING TEST: CPT

## 2025-06-12 PROCEDURE — 82728 ASSAY OF FERRITIN: CPT

## 2025-06-12 PROCEDURE — 81001 URINALYSIS AUTO W/SCOPE: CPT

## 2025-06-12 PROCEDURE — 80053 COMPREHEN METABOLIC PANEL: CPT

## 2025-06-12 PROCEDURE — 36415 COLL VENOUS BLD VENIPUNCTURE: CPT

## 2025-06-12 PROCEDURE — 85610 PROTHROMBIN TIME: CPT

## 2025-06-12 PROCEDURE — 99233 SBSQ HOSP IP/OBS HIGH 50: CPT

## 2025-06-12 PROCEDURE — 99285 EMERGENCY DEPT VISIT HI MDM: CPT

## 2025-06-12 PROCEDURE — 74176 CT ABD & PELVIS W/O CONTRAST: CPT

## 2025-06-12 PROCEDURE — 80048 BASIC METABOLIC PNL TOTAL CA: CPT

## 2025-06-12 RX ORDER — HYDROMORPHONE/SOD CHLOR,ISO/PF 2 MG/10 ML
0.5 SYRINGE (ML) INJECTION EVERY 4 HOURS
Refills: 0 | Status: DISCONTINUED | OUTPATIENT
Start: 2025-06-12 | End: 2025-06-12

## 2025-06-12 RX ORDER — HYDROMORPHONE/SOD CHLOR,ISO/PF 2 MG/10 ML
1 SYRINGE (ML) INJECTION EVERY 4 HOURS
Refills: 0 | Status: DISCONTINUED | OUTPATIENT
Start: 2025-06-12 | End: 2025-06-12

## 2025-06-12 RX ORDER — MAGNESIUM, ALUMINUM HYDROXIDE 200-200 MG
30 TABLET,CHEWABLE ORAL ONCE
Refills: 0 | Status: COMPLETED | OUTPATIENT
Start: 2025-06-12 | End: 2025-06-12

## 2025-06-12 RX ADMIN — Medication 1 MILLIGRAM(S): at 02:41

## 2025-06-12 RX ADMIN — Medication 30 MILLILITER(S): at 13:00

## 2025-06-12 RX ADMIN — ESCITALOPRAM OXALATE 10 MILLIGRAM(S): 20 TABLET ORAL at 11:06

## 2025-06-12 RX ADMIN — Medication 1 MILLIGRAM(S): at 10:19

## 2025-06-12 RX ADMIN — Medication 1 MILLIGRAM(S): at 18:40

## 2025-06-12 RX ADMIN — Medication 40 MILLIGRAM(S): at 05:16

## 2025-06-12 RX ADMIN — ROSUVASTATIN CALCIUM 20 MILLIGRAM(S): 20 TABLET, FILM COATED ORAL at 21:53

## 2025-06-12 RX ADMIN — Medication 1 MILLIGRAM(S): at 17:14

## 2025-06-12 RX ADMIN — Medication 2 TABLET(S): at 21:53

## 2025-06-12 RX ADMIN — Medication 1 MILLIGRAM(S): at 09:01

## 2025-06-12 RX ADMIN — CEFTRIAXONE 100 MILLIGRAM(S): 500 INJECTION, POWDER, FOR SOLUTION INTRAMUSCULAR; INTRAVENOUS at 05:16

## 2025-06-12 RX ADMIN — POLYETHYLENE GLYCOL 3350 17 GRAM(S): 17 POWDER, FOR SOLUTION ORAL at 11:05

## 2025-06-12 NOTE — PROGRESS NOTE ADULT - TIME BILLING
time spent reviewing the hospital notes, laboratory values, imaging findings, assessing/counseling the patient, discussing with consultant physicians, social work, nursing staff   This time spent excludes time spent teaching and/or separately reported services.   ---
time spent reviewing the hospital notes, laboratory values, imaging findings, assessing/counseling the patient, discussing with consultant physicians, social work, nursing staff  This time spent excludes time spent teaching and/or separately reported services.   ---
Patient assessment and counseling, data gathering, review of images and coordination of care

## 2025-06-12 NOTE — CASE MANAGEMENT PROGRESS NOTE - NSCMPROGRESSNOTE_GEN_ALL_CORE
Per MD pt to be transferred to [facility] with Dr Mitchell Cobb for cystoscopy with urethroscopy with possible blood clot evacuation. MUNDO spoke with Oscar at [facility] [phone]. Authorization number obtained: [id number]

## 2025-06-12 NOTE — PROGRESS NOTE ADULT - ASSESSMENT
39 y/o M with PMHx CAD, HLD, nephrolithiasis, GERD who presents with LLQ pain and passing blood clots in his urine, admitted with hematuria and urinary retention. on CBI     recurrent hematuria    - urology recommended cystoscopy and ureteroscopy with possible clot evacuation    - will need hematology clearance. discussed with heme, recommend patient to be transferred to [facility] given history of hemophilia B as we do not have ability to manage patient here. Spoke with transfer center. patient accepted for transfer.     - PRICILLA during last admission appears to be stable. Monitor hgb.     - CT with new clots noted in the bladder.     - on rocephin 1 gm daily     - pain control prn     CAD    - cont to hold aspirin     anxiety    - cont lexapro     GERD     - cont protonix. maalox added.     DVT proph: SCDs  transfer pending.

## 2025-06-12 NOTE — PROGRESS NOTE ADULT - SUBJECTIVE AND OBJECTIVE BOX
Patient is a 40y old  Male who presents with a chief complaint of Hematuria, urinary retention (11 Jun 2025 17:21)    INTERVAL HPI/OVERNIGHT EVENTS: Patient was seen and examined. Reports that pain is mostly controlled. medication wears off around 5hs and he is back in severe pain. CBI running with pinkish urine. Complaining of heartburn     I&O's Summary    11 Jun 2025 07:01  -  12 Jun 2025 07:00  --------------------------------------------------------  IN: 6350 mL / OUT: 03996 mL / NET: -6575 mL        LABS:                        11.7   9.98  )-----------( 377      ( 11 Jun 2025 12:30 )             35.0     06-11    139  |  105  |  10  ----------------------------<  115[H]  4.0   |  31  |  1.30    Ca    9.2      11 Jun 2025 12:30  Phos  2.8     06-11  Mg     1.9     06-11    TPro  6.9  /  Alb  3.5  /  TBili  0.4  /  DBili  x   /  AST  16  /  ALT  22  /  AlkPhos  55  06-11    PT/INR - ( 12 Jun 2025 06:55 )   PT: 14.3 sec;   INR: 1.22 ratio         PTT - ( 12 Jun 2025 06:55 )  PTT:34.1 sec  Urinalysis Basic - ( 11 Jun 2025 12:30 )    Color: x / Appearance: x / SG: x / pH: x  Gluc: 115 mg/dL / Ketone: x  / Bili: x / Urobili: x   Blood: x / Protein: x / Nitrite: x   Leuk Esterase: x / RBC: x / WBC x   Sq Epi: x / Non Sq Epi: x / Bacteria: x      CAPILLARY BLOOD GLUCOSE            Urinalysis Basic - ( 11 Jun 2025 12:30 )    Color: x / Appearance: x / SG: x / pH: x  Gluc: 115 mg/dL / Ketone: x  / Bili: x / Urobili: x   Blood: x / Protein: x / Nitrite: x   Leuk Esterase: x / RBC: x / WBC x   Sq Epi: x / Non Sq Epi: x / Bacteria: x        MEDICATIONS  (STANDING):  cefTRIAXone   IVPB 1000 milliGRAM(s) IV Intermittent every 24 hours  escitalopram 10 milliGRAM(s) Oral daily  naloxone Injectable 0.4 milliGRAM(s) IV Push once  pantoprazole    Tablet 40 milliGRAM(s) Oral before breakfast  polyethylene glycol 3350 17 Gram(s) Oral daily  rosuvastatin 20 milliGRAM(s) Oral at bedtime  senna 2 Tablet(s) Oral at bedtime  sodium chloride 0.9%. 1000 milliLiter(s) (100 mL/Hr) IV Continuous <Continuous>    MEDICATIONS  (PRN):  acetaminophen     Tablet .. 650 milliGRAM(s) Oral every 6 hours PRN Temp greater or equal to 38C (100.4F), Mild Pain (1 - 3)  aluminum hydroxide/magnesium hydroxide/simethicone Suspension 30 milliLiter(s) Oral every 4 hours PRN Dyspepsia  aluminum hydroxide/magnesium hydroxide/simethicone Suspension 30 milliLiter(s) Oral once PRN Dyspepsia  bisacodyl 5 milliGRAM(s) Oral daily PRN Constipation  diazepam    Tablet 2 milliGRAM(s) Oral every 6 hours PRN Bladder spasm  HYDROmorphone  Injectable 1 milliGRAM(s) IV Push every 4 hours PRN Severe Pain (7 - 10)  HYDROmorphone  Injectable 0.5 milliGRAM(s) IV Push every 4 hours PRN Moderate Pain (4 - 6)  melatonin 3 milliGRAM(s) Oral at bedtime PRN Insomnia      REVIEW OF SYSTEMS:  CONSTITUTIONAL: No fever or chills  HEENT:  No headachee  RESPIRATORY: No cough, wheezing, or shortness of breath  CARDIOVASCULAR: No chest pain  GASTROINTESTINAL: No abdominal pain, nausea, vomiting, or diarrhea  GENITOURINARY: No dysuria, + flank pain  NEUROLOGICAL: no focal weakness  MUSCULOSKELETAL: no myalgias  PSYCH: no recent changes in mood    RADIOLOGY & ADDITIONAL TESTS:    Imaging Personally Reviewed:  [x] YES  [ ] NO    Consultant(s) Notes Reviewed:  [x] YES  [ ] NO    PHYSICAL EXAM:  T(C): 36.9 (06-12-25 @ 11:13), Max: 37 (06-11-25 @ 20:10)  HR: 79 (06-12-25 @ 11:13) (74 - 80)  BP: 129/81 (06-12-25 @ 11:13) (124/77 - 147/83)  RR: 19 (06-12-25 @ 11:13) (18 - 19)  SpO2: 96% (06-12-25 @ 11:13) (94% - 98%)    GENERAL: awake, oriented   HEENT:  anicteric, moist mucous membranes  CHEST/LUNG:  CTA b/l, no rales, wheezes, or rhonchi  HEART:  RRR, S1, S2  ABDOMEN:  BS+, soft, nontender, nondistended, + CBI with pinkish urine   EXTREMITIES: no edema  NERVOUS SYSTEM: answers questions  PSYCH: normal affect    Care Discussed with Consultants/Other Providers [x] YES  [ ] NO

## 2025-06-13 ENCOUNTER — TRANSCRIPTION ENCOUNTER (OUTPATIENT)
Age: 40
End: 2025-06-13

## 2025-06-13 DIAGNOSIS — R31.9 HEMATURIA, UNSPECIFIED: ICD-10-CM

## 2025-06-13 DIAGNOSIS — N17.9 ACUTE KIDNEY FAILURE, UNSPECIFIED: ICD-10-CM

## 2025-06-13 DIAGNOSIS — R31.0 GROSS HEMATURIA: ICD-10-CM

## 2025-06-13 DIAGNOSIS — I25.10 ATHEROSCLEROTIC HEART DISEASE OF NATIVE CORONARY ARTERY WITHOUT ANGINA PECTORIS: ICD-10-CM

## 2025-06-13 DIAGNOSIS — Z29.9 ENCOUNTER FOR PROPHYLACTIC MEASURES, UNSPECIFIED: ICD-10-CM

## 2025-06-13 LAB
ANION GAP SERPL CALC-SCNC: 11 MMOL/L — SIGNIFICANT CHANGE UP (ref 7–14)
BASOPHILS # BLD AUTO: 0.02 K/UL — SIGNIFICANT CHANGE UP (ref 0–0.2)
BASOPHILS NFR BLD AUTO: 0.2 % — SIGNIFICANT CHANGE UP (ref 0–2)
BUN SERPL-MCNC: 13 MG/DL — SIGNIFICANT CHANGE UP (ref 7–23)
CALCIUM SERPL-MCNC: 8.9 MG/DL — SIGNIFICANT CHANGE UP (ref 8.4–10.5)
CHLORIDE SERPL-SCNC: 102 MMOL/L — SIGNIFICANT CHANGE UP (ref 98–107)
CO2 SERPL-SCNC: 26 MMOL/L — SIGNIFICANT CHANGE UP (ref 22–31)
CREAT SERPL-MCNC: 1.42 MG/DL — HIGH (ref 0.5–1.3)
EGFR: 63 ML/MIN/1.73M2 — SIGNIFICANT CHANGE UP
EGFR: 63 ML/MIN/1.73M2 — SIGNIFICANT CHANGE UP
EOSINOPHIL # BLD AUTO: 0.23 K/UL — SIGNIFICANT CHANGE UP (ref 0–0.5)
EOSINOPHIL NFR BLD AUTO: 2.9 % — SIGNIFICANT CHANGE UP (ref 0–6)
GLUCOSE SERPL-MCNC: 96 MG/DL — SIGNIFICANT CHANGE UP (ref 70–99)
HCT VFR BLD CALC: 32.7 % — LOW (ref 39–50)
HGB BLD-MCNC: 10.5 G/DL — LOW (ref 13–17)
IANC: 4.98 K/UL — SIGNIFICANT CHANGE UP (ref 1.8–7.4)
IMM GRANULOCYTES NFR BLD AUTO: 0.4 % — SIGNIFICANT CHANGE UP (ref 0–0.9)
LYMPHOCYTES # BLD AUTO: 2.14 K/UL — SIGNIFICANT CHANGE UP (ref 1–3.3)
LYMPHOCYTES # BLD AUTO: 26.5 % — SIGNIFICANT CHANGE UP (ref 13–44)
MAGNESIUM SERPL-MCNC: 2 MG/DL — SIGNIFICANT CHANGE UP (ref 1.6–2.6)
MCHC RBC-ENTMCNC: 31.1 PG — SIGNIFICANT CHANGE UP (ref 27–34)
MCHC RBC-ENTMCNC: 32.1 G/DL — SIGNIFICANT CHANGE UP (ref 32–36)
MCV RBC AUTO: 96.7 FL — SIGNIFICANT CHANGE UP (ref 80–100)
MONOCYTES # BLD AUTO: 0.67 K/UL — SIGNIFICANT CHANGE UP (ref 0–0.9)
MONOCYTES NFR BLD AUTO: 8.3 % — SIGNIFICANT CHANGE UP (ref 2–14)
NEUTROPHILS # BLD AUTO: 4.98 K/UL — SIGNIFICANT CHANGE UP (ref 1.8–7.4)
NEUTROPHILS NFR BLD AUTO: 61.7 % — SIGNIFICANT CHANGE UP (ref 43–77)
NRBC # BLD AUTO: 0 K/UL — SIGNIFICANT CHANGE UP (ref 0–0)
NRBC # FLD: 0 K/UL — SIGNIFICANT CHANGE UP (ref 0–0)
NRBC BLD AUTO-RTO: 0 /100 WBCS — SIGNIFICANT CHANGE UP (ref 0–0)
PHOSPHATE SERPL-MCNC: 2.6 MG/DL — SIGNIFICANT CHANGE UP (ref 2.5–4.5)
PLATELET # BLD AUTO: 321 K/UL — SIGNIFICANT CHANGE UP (ref 150–400)
POTASSIUM SERPL-MCNC: 4.4 MMOL/L — SIGNIFICANT CHANGE UP (ref 3.5–5.3)
POTASSIUM SERPL-SCNC: 4.4 MMOL/L — SIGNIFICANT CHANGE UP (ref 3.5–5.3)
RBC # BLD: 3.38 M/UL — LOW (ref 4.2–5.8)
RBC # FLD: 11.6 % — SIGNIFICANT CHANGE UP (ref 10.3–14.5)
SODIUM SERPL-SCNC: 139 MMOL/L — SIGNIFICANT CHANGE UP (ref 135–145)
WBC # BLD: 8.07 K/UL — SIGNIFICANT CHANGE UP (ref 3.8–10.5)
WBC # FLD AUTO: 8.07 K/UL — SIGNIFICANT CHANGE UP (ref 3.8–10.5)

## 2025-06-13 PROCEDURE — 99253 IP/OBS CNSLTJ NEW/EST LOW 45: CPT

## 2025-06-13 PROCEDURE — 99233 SBSQ HOSP IP/OBS HIGH 50: CPT | Mod: 25

## 2025-06-13 PROCEDURE — 99254 IP/OBS CNSLTJ NEW/EST MOD 60: CPT | Mod: GC

## 2025-06-13 PROCEDURE — 99223 1ST HOSP IP/OBS HIGH 75: CPT

## 2025-06-13 PROCEDURE — 74176 CT ABD & PELVIS W/O CONTRAST: CPT | Mod: 26

## 2025-06-13 RX ORDER — ROSUVASTATIN CALCIUM 20 MG/1
20 TABLET, FILM COATED ORAL AT BEDTIME
Refills: 0 | Status: DISCONTINUED | OUTPATIENT
Start: 2025-06-13 | End: 2025-06-16

## 2025-06-13 RX ORDER — SENNA 187 MG
2 TABLET ORAL AT BEDTIME
Refills: 0 | Status: DISCONTINUED | OUTPATIENT
Start: 2025-06-13 | End: 2025-06-16

## 2025-06-13 RX ORDER — ESCITALOPRAM OXALATE 20 MG/1
10 TABLET ORAL DAILY
Refills: 0 | Status: DISCONTINUED | OUTPATIENT
Start: 2025-06-13 | End: 2025-06-16

## 2025-06-13 RX ORDER — POLYETHYLENE GLYCOL 3350 17 G/17G
17 POWDER, FOR SOLUTION ORAL DAILY
Refills: 0 | Status: DISCONTINUED | OUTPATIENT
Start: 2025-06-13 | End: 2025-06-16

## 2025-06-13 RX ORDER — HYDROMORPHONE/SOD CHLOR,ISO/PF 2 MG/10 ML
0.5 SYRINGE (ML) INJECTION EVERY 6 HOURS
Refills: 0 | Status: DISCONTINUED | OUTPATIENT
Start: 2025-06-13 | End: 2025-06-16

## 2025-06-13 RX ORDER — ACETAMINOPHEN 500 MG/5ML
650 LIQUID (ML) ORAL EVERY 6 HOURS
Refills: 0 | Status: DISCONTINUED | OUTPATIENT
Start: 2025-06-13 | End: 2025-06-16

## 2025-06-13 RX ADMIN — POLYETHYLENE GLYCOL 3350 17 GRAM(S): 17 POWDER, FOR SOLUTION ORAL at 12:03

## 2025-06-13 RX ADMIN — Medication 2 TABLET(S): at 21:55

## 2025-06-13 RX ADMIN — Medication 40 MILLIGRAM(S): at 05:20

## 2025-06-13 RX ADMIN — ROSUVASTATIN CALCIUM 20 MILLIGRAM(S): 20 TABLET, FILM COATED ORAL at 21:55

## 2025-06-13 RX ADMIN — ESCITALOPRAM OXALATE 10 MILLIGRAM(S): 20 TABLET ORAL at 11:28

## 2025-06-13 NOTE — H&P ADULT - NSHPLABSRESULTS_GEN_ALL_CORE
.  LABS:                         11.0   9.85  )-----------( 349      ( 12 Jun 2025 12:30 )             32.5     06-12    138  |  105  |  10  ----------------------------<  85  4.5   |  29  |  1.30    Ca    9.1      12 Jun 2025 12:30  Phos  2.8     06-11  Mg     1.9     06-11    TPro  6.9  /  Alb  3.5  /  TBili  0.4  /  DBili  x   /  AST  16  /  ALT  22  /  AlkPhos  55  06-11    PT/INR - ( 12 Jun 2025 06:55 )   PT: 14.3 sec;   INR: 1.22 ratio         PTT - ( 12 Jun 2025 06:55 )  PTT:34.1 sec  Urinalysis Basic - ( 12 Jun 2025 12:30 )    Color: x / Appearance: x / SG: x / pH: x  Gluc: 85 mg/dL / Ketone: x  / Bili: x / Urobili: x   Blood: x / Protein: x / Nitrite: x   Leuk Esterase: x / RBC: x / WBC x   Sq Epi: x / Non Sq Epi: x / Bacteria: x                RADIOLOGY, EKG & ADDITIONAL TESTS: Reviewed.

## 2025-06-13 NOTE — CONSULT NOTE ADULT - PROBLEM SELECTOR RECOMMENDATION 9
-Continue newman  -continue CBi  -monitor color, irrigate prn  -Per Urology note from [place], patient transferred for need for cystoscopy, ureteroscopy and clot removal vs stent placement. Hematology recommending Factor IX infusion prior to any further intervention.   -Please keep NPO after MN for potential planned procedure.  -Full plan to be clarified in the am once confirmed from attending.  -Urology, [id number] -Continue newman  -continue CBI  -monitor color, irrigate prn  -Per Urology note from [place], patient transferred for need for cystoscopy, ureteroscopy and clot removal vs stent placement. Hematology recommending Factor IX infusion prior to any further intervention.   -Please keep NPO after MN for potential planned procedure.  -Full plan to be clarified in the am once confirmed from attending.  -Urology, [id number] -Continue newman  -continue CBI  -monitor color, irrigate prn  -Patient transferred for need for cystoscopy, ureteroscopy and clot removal vs stent placement. Hematology recommending Factor IX infusion prior to any further intervention.   -Please keep NPO after MN for potential planned procedure.  -Urology, [id number]

## 2025-06-13 NOTE — H&P ADULT - PROBLEM SELECTOR PLAN 1
Pt w/ hematuria, s/p newman and CBI. Urology recommending continuing with newman and CBI. Plan for possible cystoscopy/ureteroscopy during the daytime pending. Ucx negative , pt afebrile w/o leukocytosis.   - f/u urology recs  - monitor off abx for now

## 2025-06-13 NOTE — H&P ADULT - HISTORY OF PRESENT ILLNESS
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 Pt was seen by urology and started on CBI. Urology recommended cystoscopy/ureteroscopy with possible clot evacuation. He was seen by hematology and was recommended to transfer to [facility] given Factor IX deficiency with need for procedure.      39 y/o M with history of factor IX deficiency, CAD, HLD initially presenting to  ED for evaluation of recurrent hematuria. Pt was seen by urology and started on CBI. Urology recommended cystoscopy/ureteroscopy with possible clot evacuation. He was seen by hematology and was recommended to transfer to [facility] given Factor IX deficiency with need for procedure. Pt states he has had catheter and started on CBI, urine is now clear. Pt currently denies any abd pain or flank pain. Denies fever, chills, n/v.

## 2025-06-13 NOTE — DISCHARGE NOTE PROVIDER - NSDCCPCAREPLAN_GEN_ALL_CORE_FT
PRINCIPAL DISCHARGE DIAGNOSIS  Diagnosis: Hematuria  Assessment and Plan of Treatment:      PRINCIPAL DISCHARGE DIAGNOSIS  Diagnosis: Hematuria  Assessment and Plan of Treatment: You experienced bleeding in your urine (hematuria) which was likely due to blood clots that formed after your ureteral stent was removed. Ureteral stents, while helpful, can sometimes cause irritation and bleeding, even after they are removed. You had the stent placed on May 22nd and it was taken out on May 29th. The clots developed about a week later, on June 7th. Other potential causes of blood in the urine in your situation could include a urinary tract infection, residual stone fragments, or less commonly, bladder or kidney issues.  To address the bleeding, we initially performed continuous bladder irrigation to help flush out your bladder and any existing clots. We then performed a cystoscopy, a procedure using a small camera to look directly into your bladder and remove the remaining clots. You likely did not have a UTI, but you did spike a fever/white count, so we will continue you on a 7 day course of ciprofloxacin 500 mg bid. Because of the bleeding, we temporarily stopped your aspirin, as it can thin the blood and contribute to bleeding. We also gave you a dose of Factor IX, a blood clotting factor, to help optimize your clotting ability and reduce further bleeding. While we expect things to improve, it’s important to monitor your urine for any further bleeding, signs of infection (like fever, chills, or pain with urination), or any increase in pain. If you experience any of these, please contact us immediately. We will likely schedule follow-up testing to ensure everything is healing appropriately. Until then, make sure you're staying well-hydrated by drinking plenty of fluids.      SECONDARY DISCHARGE DIAGNOSES  Diagnosis: Coronary artery disease  Assessment and Plan of Treatment: You experienced bleeding in your urine (hematuria), likely from blood clots that formed after your ureteral stent was removed. To address this, we flushed your bladder with continuous irrigation and performed a cystoscopy to remove the clots. Due to the bleeding, we temporarily stopped your aspirin. You should follow up with your cardiologist to discuss restarting it, as you take it for calcified coronary arteries. We also gave you Factor IX to improve blood clotting. Watch for further bleeding, signs of infection (fever, chills, painful urination), or increased pain, and contact us if any of these occur.

## 2025-06-13 NOTE — H&P ADULT - NSHPREVIEWOFSYSTEMS_GEN_ALL_CORE
REVIEW OF SYSTEMS:    CONSTITUTIONAL: No weakness, fevers or chills  EYES/ENT: No visual changes;  No vertigo or throat pain   NECK: No pain or stiffness  RESPIRATORY: No cough, wheezing, hemoptysis; No shortness of breath  CARDIOVASCULAR: No chest pain or palpitations  GASTROINTESTINAL: No abdominal or epigastric pain. No nausea, vomiting, or hematemesis; No diarrhea or constipation. No melena or hematochezia.  GENITOURINARY: + dysuria, + hematuria  NEUROLOGICAL: No numbness or weakness  SKIN: No itching, rashes REVIEW OF SYSTEMS:    CONSTITUTIONAL: No weakness, fevers or chills  EYES/ENT: No visual changes;  No vertigo or throat pain   NECK: No pain or stiffness  RESPIRATORY: No cough, wheezing, hemoptysis; No shortness of breath  CARDIOVASCULAR: No chest pain or palpitations  GASTROINTESTINAL: No abdominal or epigastric pain. No nausea, vomiting, or hematemesis; No diarrhea or constipation. No melena or hematochezia.  GENITOURINARY: + hematuria  NEUROLOGICAL: No numbness or weakness  SKIN: No itching, rashes

## 2025-06-13 NOTE — PROGRESS NOTE ADULT - PROBLEM SELECTOR PLAN 1
Pt w/ hematuria, s/p newman and CBI. Urology recommending continuing with newman and CBI. Plan for possible cystoscopy/ureteroscopy during the daytime pending. Ucx negative , pt afebrile w/o leukocytosis.   - f/u urology recs  - monitor off abx for now Pt w/ hematuria, s/p newman and CBI. Urology recommending continuing with newman and CBI. Plan for possible cystoscopy/ureteroscopy during the daytime pending. Ucx negative , pt afebrile w/o leukocytosis.   - plan for cystoscopy tomorrow 6/14  - stat factor IX assay to achieve goal of 50% prior to procedure   - f/u heme recs   - monitor off abx for now

## 2025-06-13 NOTE — H&P ADULT - TIME BILLING
I have spent a total of 75 minutes time spent to prepare to see the patient, obtaining and reviewing history, physical examination, explaining the diagnosis, prognosis and treatment plan with the patient/family/caregiver. I also have spent the time ordering studies and testing, interpreting results, medicine reconciliation, subspecialty consultation and documentation as above.

## 2025-06-13 NOTE — CONSULT NOTE ADULT - SUBJECTIVE AND OBJECTIVE BOX
HPI: Ebenezer Patel is a 40 year-old male with PMH of factor IX deficiency, CAD, HLD initially presenting to  ED for evaluation of recurrent hematuria. He was evaluated by Urology and started on continuous bladder irrigation, and transferred to [facility] due to recurrent hematuria in the setting of Factor IX Deficiency.     Hematology History: Follows Hematologist Dr. Gutierrez for Factor IX Deficiency.   3/28/25 PTT 40.4, PT 12.0, INR 1.014/10/25 PTT 36.44/21/25 Labs: PTT 36.9, Mixing studies WNL, Factor XI 81, Factor VIII 68, Factor IX 38, Factor , HexagonalPhase Lupus Assay negative, Silica clotting time negative, DRVVT negative, Cardiolipin IgA, IgG, IgM WNL, B2-Glycoprotein IgA, IgG, IgM WNL  -Patient with mild Factor IX deficiency at 38% on 4/21/25 labs,.Repeat Factor IX 5/14/25 - 37%  -s/p left ureteroscopy; laser lithotripsy on 5/22/25, there were no contraindications for procedure for mildly elevated PTT, Factor IX level 38, (unlikely clinically significant given PTT 36-37 on April 2025 labs) -Patient with mild Factor IX deficiency at around 37-38% with no symptoms of bleeding. In the setting of major surgery, can consider Factor IX replacement to produce at level of %  Had tooth extraction over 20 years ago without any significant bleeding. No history of VTE.       PAST MEDICAL & SURGICAL HISTORY:  CAD (coronary artery disease)      Hyperlipidemia      Anxiety      GERD (gastroesophageal reflux disease)      Nephrolithiasis      [place] teeth extracted          Allergies    No Known Allergies    Intolerances        MEDICATIONS  (STANDING):  escitalopram 10 milliGRAM(s) Oral daily  pantoprazole    Tablet 40 milliGRAM(s) Oral before breakfast  rosuvastatin 20 milliGRAM(s) Oral at bedtime    MEDICATIONS  (PRN):  acetaminophen     Tablet .. 650 milliGRAM(s) Oral every 6 hours PRN Temp greater or equal to 38C (100.4F), Mild Pain (1 - 3)  HYDROmorphone  Injectable 0.5 milliGRAM(s) IV Push every 6 hours PRN Severe Pain (7 - 10)      FAMILY HISTORY:  FH: myocardial infarction (Mother)    FH: multiple sclerosis (Sibling)        SOCIAL HISTORY: No EtOH, no tobacco    REVIEW OF SYSTEMS:    CONSTITUTIONAL: No weakness, fevers or chills  EYES/ENT: No visual changes;  No vertigo or throat pain   NECK: No pain or stiffness  RESPIRATORY: No cough, wheezing, hemoptysis; No shortness of breath  CARDIOVASCULAR: No chest pain or palpitations  GASTROINTESTINAL: No abdominal or epigastric pain. No nausea, vomiting, or hematemesis; No diarrhea or constipation. No melena or hematochezia.  GENITOURINARY: No dysuria, frequency or hematuria  NEUROLOGICAL: No numbness or weakness  SKIN: No itching, burning, rashes, or lesions   All other review of systems is negative unless indicated above.    Height (cm): 185.4 (06-12 @ 22:40)  Weight (kg): 90.718 (06-12 @ 22:40)  BMI (kg/m2): 26.4 (06-12 @ 22:40)  BSA (m2): 2.15 (06-12 @ 22:40)    T(F): 97.9 (06-13-25 @ 05:13), Max: 98.5 (06-12-25 @ 11:13)  HR: 74 (06-13-25 @ 05:13)  BP: 128/74 (06-13-25 @ 05:13)  RR: 16 (06-13-25 @ 05:13)  SpO2: 100% (06-13-25 @ 05:13)  Wt(kg): --    GENERAL: NAD, well-developed  HEAD:  Atraumatic, Normocephalic  EYES: EOMI, PERRLA, conjunctiva and sclera clear  NECK: Supple, No JVD  CHEST/LUNG: Clear to auscultation bilaterally; No wheeze  HEART: Regular rate and rhythm; No murmurs, rubs, or gallops  ABDOMEN: Soft, Nontender, Nondistended; Bowel sounds present  EXTREMITIES:  2+ Peripheral Pulses, No clubbing, cyanosis, or edema  NEUROLOGY: non-focal  SKIN: No rashes or lesions                          10.5   8.07  )-----------( 321      ( 13 Jun 2025 06:01 )             32.7       06-13    139  |  102  |  13  ----------------------------<  96  4.4   |  26  |  1.42[H]    Ca    8.9      13 Jun 2025 06:01  Phos  2.6     06-13  Mg     2.00     06-13    TPro  6.9  /  Alb  3.5  /  TBili  0.4  /  DBili  x   /  AST  16  /  ALT  22  /  AlkPhos  55  06-11      Phosphorus: 2.6 mg/dL (06-13 @ 06:01)  Magnesium: 2.00 mg/dL (06-13 @ 06:01)      PT/INR - ( 12 Jun 2025 06:55 )   PT: 14.3 sec;   INR: 1.22 ratio         PTT - ( 12 Jun 2025 06:55 )  PTT:34.1 sec    Catheterized  06-11 @ 00:50   No growth  --  --      Clean Catch Clean Catch (Midstream)  04-21 @ 08:30   No growth  --  --      < from: CT Renal Stone Hunt (06.07.25 @ 05:50) >  IMPRESSION:  Left-sided hyperdense material seen within left lower pole calyces on the   left renal pelvis, likely related to blood products/clot. There is   associated perinephric stranding and hydronephrosis suggesting associated   obstructive uropathy. Superimposed pyelonephritis/infection may appear   similar. Correlate clinically.    Nonobstructing bilateral renal stones.    Urinary bladder wall thickening which may represent sequelae of chronic   outlet obstruction from prostate enlargement or cystitis. Correlate   clinically.    Multiloculated cystic structure seen along the right epicardial space.   Differential considerations include an atypical pericardial cyst or   possibly lymphatic lesion. This appears relatively stable in size   compared to 2/27/2025. MRI may behelpful for more definitive   characterization.    Additional findings as above.    --- End of Report ---    < end of copied text >   HPI: Ebenezer Patel is a 40 year-old male with PMH of factor IX deficiency, CAD, HLD initially presenting to  ED for evaluation of recurrent hematuria. He was evaluated by Urology and started on continuous bladder irrigation, and transferred to [facility] due to recurrent hematuria in the setting of Factor IX Deficiency. He reports he tolerated his ureteroscopy and lithotripsy on 5/22/24 with minimal hematuria following the procedure. However, 7 days prior to admission, he noticed blood clots in his urine and a few episodes of adrian hematuria, prompting him to come to the ED for evaluation. Reports intermittent suprapubic pain.    Hematology History: Follows Hematologist Dr. Gutierrez for Factor IX Deficiency. Initial workup showed elevated PTT, Mixing studies WNL, Factor IX 38, APLS labs negative. Recent labs show Factor IX=38% (4/21/25), repeat Factor IX=37% (5/14/25). s/p planned left ureteroscopy; laser lithotripsy on 5/22/25. He dad tooth extraction over 20 years ago without any significant bleeding. No history of VTE.     PAST MEDICAL & SURGICAL HISTORY:  CAD (coronary artery disease)      Hyperlipidemia      Anxiety      GERD (gastroesophageal reflux disease)      Nephrolithiasis      [place] teeth extracted          Allergies    No Known Allergies    Intolerances        MEDICATIONS  (STANDING):  escitalopram 10 milliGRAM(s) Oral daily  pantoprazole    Tablet 40 milliGRAM(s) Oral before breakfast  rosuvastatin 20 milliGRAM(s) Oral at bedtime    MEDICATIONS  (PRN):  acetaminophen     Tablet .. 650 milliGRAM(s) Oral every 6 hours PRN Temp greater or equal to 38C (100.4F), Mild Pain (1 - 3)  HYDROmorphone  Injectable 0.5 milliGRAM(s) IV Push every 6 hours PRN Severe Pain (7 - 10)      FAMILY HISTORY:  FH: myocardial infarction (Mother)    FH: multiple sclerosis (Sibling)        SOCIAL HISTORY: No EtOH, no tobacco    REVIEW OF SYSTEMS:    CONSTITUTIONAL: No weakness, fevers or chills  EYES/ENT: No visual changes;  No vertigo or throat pain   NECK: No pain or stiffness  RESPIRATORY: No cough, wheezing, hemoptysis; No shortness of breath  CARDIOVASCULAR: No chest pain or palpitations  GASTROINTESTINAL: No abdominal or epigastric pain. No nausea, vomiting, or hematemesis; No diarrhea or constipation. No melena or hematochezia.  GENITOURINARY: No dysuria, frequency or hematuria  NEUROLOGICAL: No numbness or weakness  SKIN: No itching, burning, rashes, or lesions   All other review of systems is negative unless indicated above.    Height (cm): 185.4 (06-12 @ 22:40)  Weight (kg): 90.718 (06-12 @ 22:40)  BMI (kg/m2): 26.4 (06-12 @ 22:40)  BSA (m2): 2.15 (06-12 @ 22:40)    T(F): 97.9 (06-13-25 @ 05:13), Max: 98.5 (06-12-25 @ 11:13)  HR: 74 (06-13-25 @ 05:13)  BP: 128/74 (06-13-25 @ 05:13)  RR: 16 (06-13-25 @ 05:13)  SpO2: 100% (06-13-25 @ 05:13)  Wt(kg): --    GENERAL: NAD, resting in bed  HEAD:  Atraumatic, Normocephalic  EYES: EOMI, PERRLA, conjunctiva and sclera clear  NECK: Supple, No JVD  CHEST/LUNG: Clear to auscultation bilaterally; No wheeze  HEART: Regular rate and rhythm; No murmurs, rubs, or gallops  ABDOMEN: Soft, Nontender, Nondistended; Bowel sounds present  EXTREMITIES:  2+ Peripheral Pulses, No clubbing, cyanosis, or edema  : clear colored urine while on CBI, newman catheter in place   NEUROLOGY: AOx3  SKIN: No rashes or lesions                          10.5   8.07  )-----------( 321      ( 13 Jun 2025 06:01 )             32.7       06-13    139  |  102  |  13  ----------------------------<  96  4.4   |  26  |  1.42[H]    Ca    8.9      13 Jun 2025 06:01  Phos  2.6     06-13  Mg     2.00     06-13    TPro  6.9  /  Alb  3.5  /  TBili  0.4  /  DBili  x   /  AST  16  /  ALT  22  /  AlkPhos  55  06-11      Phosphorus: 2.6 mg/dL (06-13 @ 06:01)  Magnesium: 2.00 mg/dL (06-13 @ 06:01)      PT/INR - ( 12 Jun 2025 06:55 )   PT: 14.3 sec;   INR: 1.22 ratio         PTT - ( 12 Jun 2025 06:55 )  PTT:34.1 sec    Catheterized  06-11 @ 00:50   No growth  --  --      Clean Catch Clean Catch (Midstream)  04-21 @ 08:30   No growth  --  --      < from: CT Renal Stone Hunt (06.07.25 @ 05:50) >  IMPRESSION:  Left-sided hyperdense material seen within left lower pole calyces on the   left renal pelvis, likely related to blood products/clot. There is   associated perinephric stranding and hydronephrosis suggesting associated   obstructive uropathy. Superimposed pyelonephritis/infection may appear   similar. Correlate clinically.    Nonobstructing bilateral renal stones.    Urinary bladder wall thickening which may represent sequelae of chronic   outlet obstruction from prostate enlargement or cystitis. Correlate   clinically.    Multiloculated cystic structure seen along the right epicardial space.   Differential considerations include an atypical pericardial cyst or   possibly lymphatic lesion. This appears relatively stable in size   compared to 2/27/2025. MRI may behelpful for more definitive   characterization.    Additional findings as above.    --- End of Report ---    < end of copied text >   HPI: Ebenezer Patel is a 40 year-old male with PMH of factor IX deficiency, CAD, HLD initially presenting to  ED for evaluation of recurrent hematuria. He was evaluated by Urology and started on continuous bladder irrigation, and transferred to [facility] due to recurrent hematuria in the setting of Factor IX Deficiency. He reports he tolerated his ureteroscopy and lithotripsy on 5/22/24 with minimal hematuria following the procedure. However, 7 days prior to admission, he noticed blood clots in his urine and a few episodes of adrian hematuria, prompting him to come to the ED for evaluation. Reports intermittent suprapubic pain.    Hematology History: Follows Hematologist Dr. Gutierrez for Factor IX Deficiency. Initial workup showed elevated PTT, Mixing studies WNL, Factor IX 38, APLS labs negative. Recent labs show Factor IX=38% (4/21/25), repeat Factor IX=37% (5/14/25). s/p planned left ureteroscopy; laser lithotripsy on 5/22/25. He dad tooth extraction over 20 years ago without any significant bleeding. No history of VTE.     PAST MEDICAL & SURGICAL HISTORY:  CAD (coronary artery disease)      Hyperlipidemia      Anxiety      GERD (gastroesophageal reflux disease)      Nephrolithiasis      [place] teeth extracted          Allergies    No Known Allergies    Intolerances        MEDICATIONS  (STANDING):  escitalopram 10 milliGRAM(s) Oral daily  pantoprazole    Tablet 40 milliGRAM(s) Oral before breakfast  rosuvastatin 20 milliGRAM(s) Oral at bedtime    MEDICATIONS  (PRN):  acetaminophen     Tablet .. 650 milliGRAM(s) Oral every 6 hours PRN Temp greater or equal to 38C (100.4F), Mild Pain (1 - 3)  HYDROmorphone  Injectable 0.5 milliGRAM(s) IV Push every 6 hours PRN Severe Pain (7 - 10)      FAMILY HISTORY:  FH: myocardial infarction (Mother)    FH: multiple sclerosis (Sibling)        SOCIAL HISTORY: No EtOH, no tobacco    REVIEW OF SYSTEMS:    CONSTITUTIONAL: No weakness, fevers or chills  EYES/ENT: No visual changes;  No vertigo or throat pain   NECK: No pain or stiffness  RESPIRATORY: No cough, wheezing, hemoptysis; No shortness of breath  CARDIOVASCULAR: No chest pain or palpitations  GASTROINTESTINAL: No abdominal or epigastric pain. No nausea, vomiting, or hematemesis; No diarrhea or constipation. No melena or hematochezia.  GENITOURINARY: No dysuria, frequency or hematuria  NEUROLOGICAL: No numbness or weakness  SKIN: No itching, burning, rashes, or lesions   All other review of systems is negative unless indicated above.    Height (cm): 185.4 (06-12 @ 22:40)  Weight (kg): 90.718 (06-12 @ 22:40)  BMI (kg/m2): 26.4 (06-12 @ 22:40)  BSA (m2): 2.15 (06-12 @ 22:40)    T(F): 97.9 (06-13-25 @ 05:13), Max: 98.5 (06-12-25 @ 11:13)  HR: 74 (06-13-25 @ 05:13)  BP: 128/74 (06-13-25 @ 05:13)  RR: 16 (06-13-25 @ 05:13)  SpO2: 100% (06-13-25 @ 05:13)  Wt(kg): --    GENERAL: NAD, resting in bed  HEAD:  Atraumatic, Normocephalic  EYES: EOMI, PERRLA, conjunctiva and sclera clear  NECK: Supple, No JVD  CHEST/LUNG: Clear to auscultation bilaterally; No wheeze  HEART: Regular rate and rhythm; No murmurs, rubs, or gallops  ABDOMEN: Soft, +mild suprapubic tenderness, Nondistended  EXTREMITIES:  2+ Peripheral Pulses, No LE edema  : clear colored urine while on CBI, newman catheter in place   NEUROLOGY: AOx3  SKIN: No rashes or lesions                          10.5   8.07  )-----------( 321      ( 13 Jun 2025 06:01 )             32.7       06-13    139  |  102  |  13  ----------------------------<  96  4.4   |  26  |  1.42[H]    Ca    8.9      13 Jun 2025 06:01  Phos  2.6     06-13  Mg     2.00     06-13    TPro  6.9  /  Alb  3.5  /  TBili  0.4  /  DBili  x   /  AST  16  /  ALT  22  /  AlkPhos  55  06-11      Phosphorus: 2.6 mg/dL (06-13 @ 06:01)  Magnesium: 2.00 mg/dL (06-13 @ 06:01)      PT/INR - ( 12 Jun 2025 06:55 )   PT: 14.3 sec;   INR: 1.22 ratio         PTT - ( 12 Jun 2025 06:55 )  PTT:34.1 sec    Catheterized  06-11 @ 00:50   No growth  --  --      Clean Catch Clean Catch (Midstream)  04-21 @ 08:30   No growth  --  --      < from: CT Renal Stone Hunt (06.07.25 @ 05:50) >  IMPRESSION:  Left-sided hyperdense material seen within left lower pole calyces on the   left renal pelvis, likely related to blood products/clot. There is   associated perinephric stranding and hydronephrosis suggesting associated   obstructive uropathy. Superimposed pyelonephritis/infection may appear   similar. Correlate clinically.    Nonobstructing bilateral renal stones.    Urinary bladder wall thickening which may represent sequelae of chronic   outlet obstruction from prostate enlargement or cystitis. Correlate   clinically.    Multiloculated cystic structure seen along the right epicardial space.   Differential considerations include an atypical pericardial cyst or   possibly lymphatic lesion. This appears relatively stable in size   compared to 2/27/2025. MRI may behelpful for more definitive   characterization.    Additional findings as above.    --- End of Report ---    < end of copied text >   HPI: Ebenezer Patel is a 40 year-old male with PMH of factor IX deficiency, CAD, HLD initially presenting to  ED for evaluation of recurrent hematuria. He was evaluated by Urology and started on continuous bladder irrigation, and transferred to [facility] due to recurrent hematuria in the setting of Factor IX Deficiency. He reports he tolerated his ureteroscopy and lithotripsy on 5/22/24 with minimal hematuria following the procedure. However, 7 days prior to admission, he noticed blood clots in his urine and a few episodes of adrian hematuria, prompting him to come to the ED for evaluation. Reports intermittent suprapubic pain.    Hematology History: Follows Hematologist Dr. Gutierrez for Factor IX Deficiency. Initial workup showed elevated PTT, Mixing studies WNL, Factor IX 38, APLS labs negative. Recent labs show Factor IX=38% (4/21/25), repeat Factor IX=37% (5/14/25). s/p planned left ureteroscopy; laser lithotripsy on 5/22/25.   He reportedly had tooth extraction over 20 years ago without any significant bleeding. No history of VTE.     PAST MEDICAL & SURGICAL HISTORY:  CAD (coronary artery disease)  Hyperlipidemia  Anxiety  GERD (gastroesophageal reflux disease)  Nephrolithiasis  [place] teeth extracted    Allergies    No Known Allergies    Intolerances        MEDICATIONS  (STANDING):  escitalopram 10 milliGRAM(s) Oral daily  pantoprazole    Tablet 40 milliGRAM(s) Oral before breakfast  rosuvastatin 20 milliGRAM(s) Oral at bedtime    MEDICATIONS  (PRN):  acetaminophen     Tablet .. 650 milliGRAM(s) Oral every 6 hours PRN Temp greater or equal to 38C (100.4F), Mild Pain (1 - 3)  HYDROmorphone  Injectable 0.5 milliGRAM(s) IV Push every 6 hours PRN Severe Pain (7 - 10)      FAMILY HISTORY:  FH: myocardial infarction (Mother)  FH: multiple sclerosis (Sibling)  Has one son and 2 daughters healthy        SOCIAL HISTORY: No EtOH, no tobacco    REVIEW OF SYSTEMS:    CONSTITUTIONAL: No weakness, fevers or chills  EYES/ENT: No visual changes;  No vertigo or throat pain   NECK: No pain or stiffness  RESPIRATORY: No cough, wheezing, hemoptysis; No shortness of breath  CARDIOVASCULAR: No chest pain or palpitations  GASTROINTESTINAL: No abdominal or epigastric pain. No nausea, vomiting, or hematemesis; No diarrhea or constipation. No melena or hematochezia.  GENITOURINARY: No dysuria, frequency; + hematuria as per HPI  NEUROLOGICAL: No numbness or weakness  SKIN: No itching, burning, rashes, or lesions   All other review of systems is negative unless indicated above.    Height (cm): 185.4 (06-12 @ 22:40)  Weight (kg): 90.718 (06-12 @ 22:40)  BMI (kg/m2): 26.4 (06-12 @ 22:40)  BSA (m2): 2.15 (06-12 @ 22:40)    T(F): 97.9 (06-13-25 @ 05:13), Max: 98.5 (06-12-25 @ 11:13)  HR: 74 (06-13-25 @ 05:13)  BP: 128/74 (06-13-25 @ 05:13)  RR: 16 (06-13-25 @ 05:13)  SpO2: 100% (06-13-25 @ 05:13)  Wt(kg): --    GENERAL: NAD, resting in bed  HEAD:  Atraumatic, Normocephalic  EYES: EOMI, PERRLA, conjunctiva and sclera clear  NECK: Supple, No JVD  CHEST/LUNG: Clear to auscultation bilaterally; No wheeze  HEART: Regular rate and rhythm; No murmurs, rubs, or gallops  ABDOMEN: Soft, +mild suprapubic tenderness, Nondistended  EXTREMITIES:  2+ Peripheral Pulses, No LE edema  : clear colored urine while on CBI, newman catheter in place   NEUROLOGY: AOx3  SKIN: No rashes or lesions                          10.5   8.07  )-----------( 321      ( 13 Jun 2025 06:01 )             32.7       06-13    139  |  102  |  13  ----------------------------<  96  4.4   |  26  |  1.42[H]    Ca    8.9      13 Jun 2025 06:01  Phos  2.6     06-13  Mg     2.00     06-13    TPro  6.9  /  Alb  3.5  /  TBili  0.4  /  DBili  x   /  AST  16  /  ALT  22  /  AlkPhos  55  06-11      Phosphorus: 2.6 mg/dL (06-13 @ 06:01)  Magnesium: 2.00 mg/dL (06-13 @ 06:01)      PT/INR - ( 12 Jun 2025 06:55 )   PT: 14.3 sec;   INR: 1.22 ratio         PTT - ( 12 Jun 2025 06:55 )  PTT:34.1 sec    Catheterized  06-11 @ 00:50   No growth  --  --      Clean Catch Clean Catch (Midstream)  04-21 @ 08:30   No growth  --  --      < from: CT Renal Stone Hunt (06.07.25 @ 05:50) >  IMPRESSION:  Left-sided hyperdense material seen within left lower pole calyces on the   left renal pelvis, likely related to blood products/clot. There is   associated perinephric stranding and hydronephrosis suggesting associated   obstructive uropathy. Superimposed pyelonephritis/infection may appear   similar. Correlate clinically.    Nonobstructing bilateral renal stones.    Urinary bladder wall thickening which may represent sequelae of chronic   outlet obstruction from prostate enlargement or cystitis. Correlate   clinically.    Multiloculated cystic structure seen along the right epicardial space.   Differential considerations include an atypical pericardial cyst or   possibly lymphatic lesion. This appears relatively stable in size   compared to 2/27/2025. MRI may behelpful for more definitive   characterization.    Additional findings as above.    --- End of Report ---    < end of copied text >

## 2025-06-13 NOTE — PROGRESS NOTE ADULT - PROBLEM SELECTOR PLAN 2
heme consult emailed for possible factor replacement in setting of hematuria and prior to procedure heme consult emailed for possible factor replacement in setting of hematuria and prior to procedure as above   - recently diagnosed within the past few weeks.

## 2025-06-13 NOTE — PROGRESS NOTE ADULT - PROBLEM SELECTOR PLAN 6
DVT PPx: SCDs iso hematuria   E: replete K<4, Mg<2  GI PPx: Pantoprazole 40 mg   Diet: DASH, NPO MN for cystoscopy tomorrow  PT/OT: none  Code Status: Full  Dispo: pending medical improvement

## 2025-06-13 NOTE — H&P ADULT - NSHPPHYSICALEXAM_GEN_ALL_CORE
VITAL SIGNS:  T(C): 36.8 (06-12-25 @ 22:40), Max: 36.9 (06-12-25 @ 11:13)  T(F): 98.2 (06-12-25 @ 22:40), Max: 98.5 (06-12-25 @ 11:13)  HR: 82 (06-12-25 @ 22:40) (78 - 82)  BP: 131/82 (06-12-25 @ 22:40) (124/77 - 131/86)  BP(mean): --  RR: 16 (06-12-25 @ 22:40) (16 - 19)  SpO2: 100% (06-12-25 @ 22:40) (94% - 100%)  Wt(kg): --    PHYSICAL EXAM:    Constitutional: resting comfortably in bed; NAD  Head: NC/AT  Eyes: PERRL, EOMI, anicteric sclera  ENT: no nasal discharge; uvula midline, no oropharyngeal erythema or exudates; MMM  Neck: supple  Respiratory: CTA B/L; no W/R/R, no retractions  Cardiac: +S1/S2; RRR; no M/R/G  Gastrointestinal: abdomen soft, NT/ND; no rebound or guarding; +BSx4  Back: spine midline, no bony tenderness  Extremities: WWP, no clubbing or cyanosis; no peripheral edema  Musculoskeletal: NROM x4; no joint swelling, tenderness or erythema  Vascular: distal pulses intact  Dermatologic: skin warm, dry and intact; no rashes  Lymphatic: no submandibular or cervical LAD  Neurologic: AAOx3; moves all 4 extremities  Psychiatric: affect and characteristics of appearance, verbalizations, behaviors are appropriate

## 2025-06-13 NOTE — CONSULT NOTE ADULT - SUBJECTIVE AND OBJECTIVE BOX
HPI: 41yo Male with h/o anxiety, GERD, HLD, CAD, L kidney calcifications s/p cystoscopy, failed lithotripsy, ureteral stricture dilation, stent placement (5/22/25, Dylan) with stent removal 5/29/25, gross hematuria with passage of clots as of 6/7, clot retention retaining ~800cc, requiring catheter, irrigation and CBI, newly diagnosed with Factor IX deficiency, transferred from [facility] for further urologic and hematologic management. Reports he has remained on CBI for 3 days now, intermittently requiring manual irrigation secondary to clot development.  He has been off ASA 81 for 6 days.  Denies need for transfusion. Denies fevers.      PAST MEDICAL & SURGICAL HISTORY:  CAD (coronary artery disease)    Hyperlipidemia    Anxiety    GERD (gastroesophageal reflux disease)    Nephrolithiasis    MEDICATIONS:  Home Medications:  acetaminophen 325 mg oral tablet: 2 tab(s) orally every 6 hours As needed Temp greater or equal to 38C (100.4F), Mild Pain (1 - 3) (11 Jun 2025 16:54)  aluminum hydroxide-magnesium hydroxide 200 mg-200 mg/5 mL oral suspension: 30 milliliter(s) orally every 4 hours As needed Dyspepsia (11 Jun 2025 16:54)  bisacodyl 5 mg oral delayed release tablet: 1 tab(s) orally once a day As needed Constipation (11 Jun 2025 16:54)  cefTRIAXone: 1 gram(s) intravenous once a day (11 Jun 2025 16:54)  diazePAM 2 mg oral tablet: 1 tab(s) orally every 6 hours As needed Bladder spasm (11 Jun 2025 16:54)  escitalopram 10 mg oral tablet: 1 tab(s) orally once a day (in the morning) (11 Jun 2025 06:11)  HYDROmorphone: 1 milligram(s) intravenous every 4 hours as needed for  severe pain (12 Jun 2025 12:24)  melatonin 3 mg oral tablet: 1 tab(s) orally once a day (at bedtime) As needed Insomnia (11 Jun 2025 16:54)  pantoprazole 40 mg oral delayed release tablet: 1 tab(s) orally once a day (in the morning) (11 Jun 2025 06:11)  polyethylene glycol 3350 oral powder for reconstitution: 17 gram(s) orally once a day (11 Jun 2025 16:54)  rosuvastatin 20 mg oral tablet: 1 tab(s) orally once a day (at bedtime) (11 Jun 2025 06:11)  senna leaf extract oral tablet: 2 tab(s) orally once a day (at bedtime) (11 Jun 2025 16:54)    FAMILY HISTORY:  FH: myocardial infarction (Mother)    FH: multiple sclerosis (Sibling)    Allergies  No Known Allergies    SOCIAL HISTORY:    REVIEW OF SYSTEMS: Otherwise negative as stated in HPI    PHYSICAL EXAM:  Vital Signs Last 24 Hrs  T(C): 36.8 (12 Jun 2025 22:40), Max: 36.9 (12 Jun 2025 11:13)  T(F): 98.2 (12 Jun 2025 22:40), Max: 98.5 (12 Jun 2025 11:13)  HR: 82 (12 Jun 2025 22:40) (78 - 82)  BP: 131/82 (12 Jun 2025 22:40) (124/77 - 131/86)  BP(mean): --  RR: 16 (12 Jun 2025 22:40) (16 - 19)  SpO2: 100% (12 Jun 2025 22:40) (94% - 100%)    Parameters below as of 12 Jun 2025 22:40  Patient On (Oxygen Delivery Method): room air    General: Awake and Alert in no acute distress    Respiratory and Thorax: no resp distress   	  Cardiovascular: regular     Gastrointestinal: soft, non tender, no distention    Genitourinary: 18F three way catheter in place, with CBI, draining well,  running clear                        	  Extremities:  no tenderness    Neuro: no focal deficits    Psych: appropriate  	  LABS:                        11.0   9.85  )-----------( 349      ( 12 Jun 2025 12:30 )             32.5     06-12    138  |  105  |  10  ----------------------------<  85  4.5   |  29  |  1.30    Ca    9.1      12 Jun 2025 12:30  Phos  2.8     06-11  Mg     1.9     06-11    TPro  6.9  /  Alb  3.5  /  TBili  0.4  /  DBili  x   /  AST  16  /  ALT  22  /  AlkPhos  55  06-11    PT/INR - ( 12 Jun 2025 06:55 )   PT: 14.3 sec;   INR: 1.22 ratio    PTT - ( 12 Jun 2025 06:55 )  PTT:34.1 sec  Urinalysis with Rflx Culture (06.11.25 @ 00:50)   Urine Appearance: Clear  Color: Red  Specific Gravity: 1.004  pH Urine: 7.0  Protein, Urine: 100 mg/dL  Glucose Qualitative, Urine: Negative mg/dL  Ketone , Urine: Negative mg/dL  Blood, Urine: Large  Bilirubin: Negative  Urobilinogen: 0.2 mg/dL  Leukocyte Esterase Concentration: Moderate  Nitrite: NegativeUrine Microscopic-Add On (NC) (06.11.25 @ 00:50)   White Blood Cell - Urine: 10 /HPF  Red Blood Cell - Urine: 62 /HPF  Bacteria: Occasional /HPF  Squamous Epithelial Cells: PresentUrinalysis Basic - ( 12 Jun 2025 12:30 )  Color: x / Appearance: x / SG: x / pH: x  Gluc: 85 mg/dL / Ketone: x  / Bili: x / Urobili: x   Blood: x / Protein: x / Nitrite: x   Leuk Esterase: x / RBC: x / WBC x   Sq Epi: x / Non Sq Epi: x / Bacteria: x    URINE CX:    RADIOLOGY:  < from: CT Renal Stone Hunt (06.10.25 @ 22:52) >  FINDINGS:  LOWER CHEST: 3 mm right middle lobe pulmonary nodule (2/11) similar   multilobulated cystic structure in the right epicardial space    LIVER: Within normal limits.  BILE DUCTS: Normal caliber.  GALLBLADDER: Within normal limits.  SPLEEN: Within normal limits.  PANCREAS: Within normal limits.  ADRENALS: Within normal limits.  KIDNEYS/URETERS: Similar mild to moderate left hydroureteronephrosis with   high density intraluminal material, presumably blood products  No right hydronephrosis.  BLADDER: Dependent high density material, likely blood products, new   compared to 6/11/2025  REPRODUCTIVE ORGANS: Prostate within normal limits.    BOWEL: No bowel obstruction. Appendix is not visualized.  PERITONEUM/RETROPERITONEUM: Within normal limits.  VESSELS: Within normal limits.  LYMPH NODES: No lymphadenopathy.  ABDOMINAL WALL: Within normal limits.  BONES: Degenerative changes.    IMPRESSION:  Similar mild to moderate left hydroureteronephrosis with high density   intraluminal material within the renal collecting system and ureter,   likely representing blood products.    High density dependent material within the urinary bladder, likely   representing blood products, new compared to 6/7/2025. Underlying   neoplasm is not definitively excluded.    --- End of Report ---    HAIM FISHER MD; Attending Radiologist  This document has been electronically signed. Jose 10 2025 11:39PM    < end of copied text >   HPI: 39yo Male with h/o anxiety, GERD, HLD, CAD, L kidney calcifications s/p cystoscopy, laser incision of left lower pole calyceal narrowing, failed lithotripsy, stent placement (5/22/25, Dylan) with stent removal 5/29/25, gross hematuria with passage of clots as of 6/7, clot retention retaining ~800cc, requiring catheter, irrigation and CBI, newly diagnosed with Factor IX deficiency, transferred from [facility] for further urologic and hematologic management. Reports he has remained on CBI for 3 days now, intermittently requiring manual irrigation secondary to clot development.  He has been off ASA 81 for 6 days.  Denies need for transfusion. Denies fevers.      PAST MEDICAL & SURGICAL HISTORY:  CAD (coronary artery disease)    Hyperlipidemia    Anxiety    GERD (gastroesophageal reflux disease)    Nephrolithiasis    MEDICATIONS:  Home Medications:  acetaminophen 325 mg oral tablet: 2 tab(s) orally every 6 hours As needed Temp greater or equal to 38C (100.4F), Mild Pain (1 - 3) (11 Jun 2025 16:54)  aluminum hydroxide-magnesium hydroxide 200 mg-200 mg/5 mL oral suspension: 30 milliliter(s) orally every 4 hours As needed Dyspepsia (11 Jun 2025 16:54)  bisacodyl 5 mg oral delayed release tablet: 1 tab(s) orally once a day As needed Constipation (11 Jun 2025 16:54)  cefTRIAXone: 1 gram(s) intravenous once a day (11 Jun 2025 16:54)  diazePAM 2 mg oral tablet: 1 tab(s) orally every 6 hours As needed Bladder spasm (11 Jun 2025 16:54)  escitalopram 10 mg oral tablet: 1 tab(s) orally once a day (in the morning) (11 Jun 2025 06:11)  HYDROmorphone: 1 milligram(s) intravenous every 4 hours as needed for  severe pain (12 Jun 2025 12:24)  melatonin 3 mg oral tablet: 1 tab(s) orally once a day (at bedtime) As needed Insomnia (11 Jun 2025 16:54)  pantoprazole 40 mg oral delayed release tablet: 1 tab(s) orally once a day (in the morning) (11 Jun 2025 06:11)  polyethylene glycol 3350 oral powder for reconstitution: 17 gram(s) orally once a day (11 Jun 2025 16:54)  rosuvastatin 20 mg oral tablet: 1 tab(s) orally once a day (at bedtime) (11 Jun 2025 06:11)  senna leaf extract oral tablet: 2 tab(s) orally once a day (at bedtime) (11 Jun 2025 16:54)    FAMILY HISTORY:  FH: myocardial infarction (Mother)    FH: multiple sclerosis (Sibling)    Allergies  No Known Allergies    SOCIAL HISTORY:    REVIEW OF SYSTEMS: Otherwise negative as stated in HPI    PHYSICAL EXAM:  Vital Signs Last 24 Hrs  T(C): 36.8 (12 Jun 2025 22:40), Max: 36.9 (12 Jun 2025 11:13)  T(F): 98.2 (12 Jun 2025 22:40), Max: 98.5 (12 Jun 2025 11:13)  HR: 82 (12 Jun 2025 22:40) (78 - 82)  BP: 131/82 (12 Jun 2025 22:40) (124/77 - 131/86)  BP(mean): --  RR: 16 (12 Jun 2025 22:40) (16 - 19)  SpO2: 100% (12 Jun 2025 22:40) (94% - 100%)    Parameters below as of 12 Jun 2025 22:40  Patient On (Oxygen Delivery Method): room air    General: Awake and Alert in no acute distress    Respiratory and Thorax: no resp distress   	  Cardiovascular: regular     Gastrointestinal: soft, non tender, no distention    Genitourinary: 18F three way catheter in place, with CBI, draining well,  running clear                        	  Extremities:  no tenderness    Neuro: no focal deficits    Psych: appropriate  	  LABS:                        11.0   9.85  )-----------( 349      ( 12 Jun 2025 12:30 )             32.5     06-12    138  |  105  |  10  ----------------------------<  85  4.5   |  29  |  1.30    Ca    9.1      12 Jun 2025 12:30  Phos  2.8     06-11  Mg     1.9     06-11    TPro  6.9  /  Alb  3.5  /  TBili  0.4  /  DBili  x   /  AST  16  /  ALT  22  /  AlkPhos  55  06-11    PT/INR - ( 12 Jun 2025 06:55 )   PT: 14.3 sec;   INR: 1.22 ratio    PTT - ( 12 Jun 2025 06:55 )  PTT:34.1 sec  Urinalysis with Rflx Culture (06.11.25 @ 00:50)   Urine Appearance: Clear  Color: Red  Specific Gravity: 1.004  pH Urine: 7.0  Protein, Urine: 100 mg/dL  Glucose Qualitative, Urine: Negative mg/dL  Ketone , Urine: Negative mg/dL  Blood, Urine: Large  Bilirubin: Negative  Urobilinogen: 0.2 mg/dL  Leukocyte Esterase Concentration: Moderate  Nitrite: NegativeUrine Microscopic-Add On (NC) (06.11.25 @ 00:50)   White Blood Cell - Urine: 10 /HPF  Red Blood Cell - Urine: 62 /HPF  Bacteria: Occasional /HPF  Squamous Epithelial Cells: PresentUrinalysis Basic - ( 12 Jun 2025 12:30 )  Color: x / Appearance: x / SG: x / pH: x  Gluc: 85 mg/dL / Ketone: x  / Bili: x / Urobili: x   Blood: x / Protein: x / Nitrite: x   Leuk Esterase: x / RBC: x / WBC x   Sq Epi: x / Non Sq Epi: x / Bacteria: x    URINE CX:    RADIOLOGY:  < from: CT Renal Stone Hunt (06.10.25 @ 22:52) >  FINDINGS:  LOWER CHEST: 3 mm right middle lobe pulmonary nodule (2/11) similar   multilobulated cystic structure in the right epicardial space    LIVER: Within normal limits.  BILE DUCTS: Normal caliber.  GALLBLADDER: Within normal limits.  SPLEEN: Within normal limits.  PANCREAS: Within normal limits.  ADRENALS: Within normal limits.  KIDNEYS/URETERS: Similar mild to moderate left hydroureteronephrosis with   high density intraluminal material, presumably blood products  No right hydronephrosis.  BLADDER: Dependent high density material, likely blood products, new   compared to 6/11/2025  REPRODUCTIVE ORGANS: Prostate within normal limits.    BOWEL: No bowel obstruction. Appendix is not visualized.  PERITONEUM/RETROPERITONEUM: Within normal limits.  VESSELS: Within normal limits.  LYMPH NODES: No lymphadenopathy.  ABDOMINAL WALL: Within normal limits.  BONES: Degenerative changes.    IMPRESSION:  Similar mild to moderate left hydroureteronephrosis with high density   intraluminal material within the renal collecting system and ureter,   likely representing blood products.    High density dependent material within the urinary bladder, likely   representing blood products, new compared to 6/7/2025. Underlying   neoplasm is not definitively excluded.    --- End of Report ---    HAIM FISHER MD; Attending Radiologist  This document has been electronically signed. Jose 10 2025 11:39PM    < end of copied text >

## 2025-06-13 NOTE — CONSULT NOTE ADULT - ATTENDING COMMENTS
------------------------------------------------------------------------------------------------------  Patient seen and examined on rounds with hematology fellows Dr. Gan and Dr. Shay  Briefly, he is followed by hematology for mildly suppressed Factor IX deficiency that was diagnosed when he was incidentally found to have elevated PTT. He presented with hematuria that has resolved with CBI but is planned for cystoscopy and evacuation of clot material in his bladder  - recommend to administer Factor IX produce (Benefix) as per fellow's note above (1500 units) within 1 hour prior to procedure and monitor factor IX levels with goal to be at least 50%  - hematology to follow in the kenny-operative period

## 2025-06-13 NOTE — DISCHARGE NOTE PROVIDER - NSFOLLOWUPCLINICS_GEN_ALL_ED_FT
[facility]  Urology  [address], [address]  [place] [address]  Phone: [phone]  Fax:   Follow Up Time: 1 week    [facility]  Hematology/Oncology  [address] [place] [address]  Phone: [phone]  Fax:   Follow Up Time: 2 weeks    [facility]  Cardiology  [address], [address]  [place] [address]  Phone: [phone]  Fax:   Follow Up Time: 1 month

## 2025-06-13 NOTE — CONSULT NOTE ADULT - ASSESSMENT
41yo Male with h/o anxiety, GERD, HLD, CAD, L kidney calcifications s/p cystoscopy, failed lithotripsy, ureteral stricture dilation, stent placement (5/22/25, Dylan) with stent removal 5/29/25, gross hematuria with passage of clots as of 6/7, clot retention retaining ~800cc, requiring catheter, irrigation and CBI, newly diagnosed with Factor IX deficiency, transferred from [facility] for further urologic and hematologic management, remains on CBI, running clear, and is hemodynamically stable. 41yo Male with h/o anxiety, GERD, HLD, CAD, L kidney calcifications s/p cystoscopy, laser incision of left lower pole calyceal narrowing, failed lithotripsy, stent placement (5/22/25, Dylan) with stent removal 5/29/25, gross hematuria with passage of clots as of 6/7, clot retention retaining ~800cc, requiring catheter, irrigation and CBI, newly diagnosed with Factor IX deficiency, transferred from [facility] for further urologic and hematologic management, remains on CBI, running clear, and is hemodynamically stable. WBC 9.9, H/H 11/32.5 from 11.7/35, Cr 1.3 from 1.3, UA (6/11) no growth. CTAP from 6/10 demonstrates mild/moderate L hydronephrosis with some blood product in L collecting system and in dependent portion of bladder.

## 2025-06-13 NOTE — DISCHARGE NOTE PROVIDER - CARE PROVIDER_API CALL
Real Turner  Internal Medicine  [address] [place] [id number]  Phone: [phone]  Fax: [phone]  Follow Up Time: 1 week

## 2025-06-13 NOTE — PROGRESS NOTE ADULT - PROBLEM SELECTOR PLAN 5
DVT prophylaxis: SCDs  Diet: npo for now pending possible procedure w/ urology c/w escitalopram 10 mg QD

## 2025-06-13 NOTE — DISCHARGE NOTE PROVIDER - HOSPITAL COURSE
HPI:  41 y/o M with history of factor IX deficiency, CAD, HLD initially presenting to  ED for evaluation of recurrent hematuria. Pt was seen by urology and started on CBI. Urology recommended cystoscopy/ureteroscopy with possible clot evacuation. He was seen by hematology and was recommended to transfer to [facility] given Factor IX deficiency with need for procedure. Pt states he has had catheter and started on CBI, urine is now clear. Pt currently denies any abd pain or flank pain. Denies fever, chills, n/v.      (13 Jun 2025 00:15)    Hospital Course:    Important Medication Changes and Reason:  Started:  Stopped:  Continued:     Active or Pending Issues Requiring Follow-up:    Advanced Directives:   [ ] Full code  [ ] DNR  [ ] Hospice    Discharge Diagnoses:         HPI:  39 y/o M with history of factor IX deficiency, CAD, HLD initially presenting to  ED for evaluation of recurrent hematuria. Pt was seen by urology and started on CBI. Urology recommended cystoscopy/ureteroscopy with possible clot evacuation. He was seen by hematology and was recommended to transfer to [facility] given Factor IX deficiency with need for procedure. Pt states he has had catheter and started on CBI, urine is now clear. Pt currently denies any abd pain or flank pain. Denies fever, chills, n/v.      (13 Jun 2025 00:15)    Hospital Course: Patient was transferred to [facility] for cystoscopy given that his CT of the abdomen was showing L hydronephrosis with concern for obstructive clot. He was initially started on CBI per urology. ISO of his factor IX deficiency, hematology was consulted who recommended 1500 u Factor IX prior to his cystoscopy for preoperative optimization. Patient tolerated cystoscopy on 6/14 with clearance of clots and no longer had hematuria or dysuria. He did spike a WBC to 13 and become febrile to 101.9F on 6/15. UA was unremarkable, and initial blood cultures were negative. This leukocytosis and fever both likely came from systemic response to instrumentation during cystoscopy, so urology recommended a 7 day antibiotic course. His aspirin for his CAD found on CT coronaries in 10/2024 was held iso of recent hematuria and he would follow up outpatient on when to restart it.     Important Medication Changes and Reason:  Started: Ciprofloxacin for prophylactic UTI treatment s/p cystoscopy  Stopped: Aspirin 81 iso hematuria    Active or Pending Issues Requiring Follow-up:  1. Urology for clot maintenance and to follow up from cystoscopy  2. Hematology for continued management of factor IX deficiency  3.     Advanced Directives:   [ ] Full code  [ ] DNR  [ ] Hospice    Discharge Diagnoses:         HPI:  39 y/o M with history of factor IX deficiency, CAD, HLD initially presenting to  ED for evaluation of recurrent hematuria. Pt was seen by urology and started on CBI. Urology recommended cystoscopy/ureteroscopy with possible clot evacuation. He was seen by hematology and was recommended to transfer to [facility] given Factor IX deficiency with need for procedure. Pt states he has had catheter and started on CBI, urine is now clear. Pt currently denies any abd pain or flank pain. Denies fever, chills, n/v.      (13 Jun 2025 00:15)    Hospital Course: Patient was transferred to [facility] for cystoscopy given that his CT of the abdomen was showing L hydronephrosis with concern for obstructive clot. He was initially started on CBI per urology. ISO of his factor IX deficiency, hematology was consulted who recommended 1500 u Factor IX prior to his cystoscopy for preoperative optimization. Patient tolerated cystoscopy on 6/14 with clearance of clots and no longer had hematuria or dysuria. He did spike a WBC to 13 and become febrile to 101.9F on 6/15. UA was unremarkable, and initial blood cultures were negative. This leukocytosis and fever both likely came from systemic response to instrumentation during cystoscopy, so urology recommended a 7 day antibiotic course. His aspirin for his CAD found on CT coronaries in 10/2024 was held iso of recent hematuria and he would follow up outpatient on when to restart it.     Important Medication Changes and Reason:  Started: Ciprofloxacin for prophylactic UTI treatment s/p cystoscopy  Stopped: Aspirin 81 iso hematuria    Active or Pending Issues Requiring Follow-up:  1. Urology for clot maintenance and to follow up from cystoscopy  2. Hematology for continued management of factor IX deficiency  3. Cardiology on when to restart aspirin    Advanced Directives:   [ ] Full code  [ ] DNR  [ ] Hospice    Discharge Diagnoses:  1. Obstructive PRICILLA   2. Urinary blood clots   3. Factor IX deficiency  4. CAD  5. HLD  6. Anxiety         HPI:  39 y/o M with history of factor IX deficiency, CAD, HLD initially presenting to  ED for evaluation of recurrent hematuria. Pt was seen by urology and started on CBI. Urology recommended cystoscopy/ureteroscopy with possible clot evacuation. He was seen by hematology and was recommended to transfer to [facility] given Factor IX deficiency with need for procedure. Pt states he has had catheter and started on CBI, urine is now clear. Pt currently denies any abd pain or flank pain. Denies fever, chills, n/v.      (13 Jun 2025 00:15)    Hospital Course: Patient was transferred to [facility] for cystoscopy given that his CT of the abdomen was showing L hydronephrosis with concern for obstructive clot. He was initially started on CBI per urology. ISO of his factor IX deficiency, hematology was consulted who recommended 1500 u Factor IX prior to his cystoscopy for preoperative optimization. Patient tolerated cystoscopy on 6/14 with clearance of clots and no longer had hematuria or dysuria. He did spike a WBC to 13 and become febrile to 101.9F on 6/15. UA was unremarkable, and initial blood cultures were negative. This leukocytosis and fever both likely came from systemic response to instrumentation during cystoscopy, so urology recommended a 7 day antibiotic course. His aspirin for his CAD found on CT coronaries in 10/2024 was held iso of recent hematuria and he would follow up outpatient on when to restart it. .    Important Medication Changes and Reason:  Started: Ciprofloxacin for prophylactic UTI treatment s/p cystoscopy  Stopped: Aspirin 81 iso hematuria    Active or Pending Issues Requiring Follow-up:  1. Urology for clot maintenance and to follow up from cystoscopy  2. Hematology for continued management of factor IX deficiency  3. Cardiology on when to restart aspirin    Advanced Directives:   [ ] Full code  [ ] DNR  [ ] Hospice    Discharge Diagnoses:  1. Obstructive PRICILLA   2. Urinary blood clots   3. Factor IX deficiency  4. CAD  5. HLD  6. Anxiety

## 2025-06-13 NOTE — DISCHARGE NOTE PROVIDER - NSDCFUADDAPPT_GEN_ALL_CORE_FT
APPTS ARE READY TO BE MADE: [ ] YES    Best Family or Patient Contact (if needed):    Additional Information about above appointments (if needed):    1: PCP  2: Urology  3:     Other comments or requests:    APPTS ARE READY TO BE MADE: [ ] YES    Best Family or Patient Contact (if needed):    Additional Information about above appointments (if needed):    1: PCP  2: Urology  3: Hematology  4. Cardiology    Other comments or requests:

## 2025-06-13 NOTE — PROGRESS NOTE ADULT - SUBJECTIVE AND OBJECTIVE BOX
******************  Authored By: Patrick Mejia MD, PGY1  MS Teams Preferred  ******************  INTERVAL HPI/OVERNIGHT EVENTS:  Pt seen and examined at bedside. No acute overnight events or complaints.    Brief Daily Plan:  -    VITAL SIGNS:  T(F): 97.9 (06-13-25 @ 05:13)  HR: 74 (06-13-25 @ 05:13)  BP: 128/74 (06-13-25 @ 05:13)  RR: 16 (06-13-25 @ 05:13)  SpO2: 100% (06-13-25 @ 05:13)  Wt(kg): --    CAPILLARY BLOOD GLUCOSE          PHYSICAL EXAM:    Constitutional: resting comfortably in bed; NAD  Head: NC/AT  Eyes: PERRL, EOMI, anicteric sclera  ENT: no nasal discharge; uvula midline, no oropharyngeal erythema or exudates; MMM  Neck: supple  Respiratory: CTA B/L; no W/R/R, no retractions  Cardiac: +S1/S2; RRR; no M/R/G  Gastrointestinal: abdomen soft, NT/ND; no rebound or guarding; +BSx4  Back: spine midline, no bony tenderness  Extremities: WWP, no clubbing or cyanosis; no peripheral edema  Musculoskeletal: NROM x4; no joint swelling, tenderness or erythema  Vascular: distal pulses intact  Dermatologic: skin warm, dry and intact; no rashes  Lymphatic: no submandibular or cervical LAD  Neurologic: AAOx3; moves all 4 extremities  Psychiatric: affect and characteristics of appearance, verbalizations, behaviors are appropriate    MEDICATIONS  (STANDING):  escitalopram 10 milliGRAM(s) Oral daily  pantoprazole    Tablet 40 milliGRAM(s) Oral before breakfast  rosuvastatin 20 milliGRAM(s) Oral at bedtime    MEDICATIONS  (PRN):  acetaminophen     Tablet .. 650 milliGRAM(s) Oral every 6 hours PRN Temp greater or equal to 38C (100.4F), Mild Pain (1 - 3)  HYDROmorphone  Injectable 0.5 milliGRAM(s) IV Push every 6 hours PRN Severe Pain (7 - 10)      Allergies    No Known Allergies    Intolerances        LABS:                        11.0   9.85  )-----------( 349      ( 12 Jun 2025 12:30 )             32.5     06-12    138  |  105  |  10  ----------------------------<  85  4.5   |  29  |  1.30    Ca    9.1      12 Jun 2025 12:30  Phos  2.8     06-11  Mg     1.9     06-11    TPro  6.9  /  Alb  3.5  /  TBili  0.4  /  DBili  x   /  AST  16  /  ALT  22  /  AlkPhos  55  06-11    PT/INR - ( 12 Jun 2025 06:55 )   PT: 14.3 sec;   INR: 1.22 ratio         PTT - ( 12 Jun 2025 06:55 )  PTT:34.1 sec  Urinalysis Basic - ( 12 Jun 2025 12:30 )    Color: x / Appearance: x / SG: x / pH: x  Gluc: 85 mg/dL / Ketone: x  / Bili: x / Urobili: x   Blood: x / Protein: x / Nitrite: x   Leuk Esterase: x / RBC: x / WBC x   Sq Epi: x / Non Sq Epi: x / Bacteria: x          RADIOLOGY & ADDITIONAL TESTS:  Reviewed    ******************   ******************  Authored By: Patrick Mejia MD, PGY1  MS Teams Preferred  ******************  INTERVAL HPI/OVERNIGHT EVENTS:  Pt seen and examined at bedside. No acute overnight events or complaints.    Brief Daily Plan:  -    VITAL SIGNS:  T(F): 97.9 (06-13-25 @ 05:13)  HR: 74 (06-13-25 @ 05:13)  BP: 128/74 (06-13-25 @ 05:13)  RR: 16 (06-13-25 @ 05:13)  SpO2: 100% (06-13-25 @ 05:13)  Wt(kg): --    CAPILLARY BLOOD GLUCOSE    PHYSICAL EXAM:    Constitutional: resting comfortably in bed; NAD  Head: NC/AT  Eyes: PERRL, EOMI, anicteric sclera  ENT: no nasal discharge; uvula midline, no oropharyngeal erythema or exudates; MMM  Neck: supple  Respiratory: CTA B/L; no W/R/R, no retractions  Cardiac: +S1/S2; RRR; no M/R/G  Gastrointestinal: abdomen soft, NT/ND; no rebound or guarding; +BSx4  Back: spine midline, no bony tenderness  Extremities: WWP, no clubbing or cyanosis; no peripheral edema  Musculoskeletal: NROM x4; no joint swelling, tenderness or erythema  Vascular: distal pulses intact  Dermatologic: skin warm, dry and intact; no rashes  Lymphatic: no submandibular or cervical LAD  Neurologic: AAOx3; moves all 4 extremities  Psychiatric: affect and characteristics of appearance, verbalizations, behaviors are appropriate    MEDICATIONS  (STANDING):  escitalopram 10 milliGRAM(s) Oral daily  pantoprazole    Tablet 40 milliGRAM(s) Oral before breakfast  rosuvastatin 20 milliGRAM(s) Oral at bedtime    MEDICATIONS  (PRN):  acetaminophen     Tablet .. 650 milliGRAM(s) Oral every 6 hours PRN Temp greater or equal to 38C (100.4F), Mild Pain (1 - 3)  HYDROmorphone  Injectable 0.5 milliGRAM(s) IV Push every 6 hours PRN Severe Pain (7 - 10)    Allergies  No Known Allergies  Intolerances    LABS:                        11.0   9.85  )-----------( 349      ( 12 Jun 2025 12:30 )             32.5     06-12    138  |  105  |  10  ----------------------------<  85  4.5   |  29  |  1.30    Ca    9.1      12 Jun 2025 12:30  Phos  2.8     06-11  Mg     1.9     06-11    TPro  6.9  /  Alb  3.5  /  TBili  0.4  /  DBili  x   /  AST  16  /  ALT  22  /  AlkPhos  55  06-11    PT/INR - ( 12 Jun 2025 06:55 )   PT: 14.3 sec;   INR: 1.22 ratio         PTT - ( 12 Jun 2025 06:55 )  PTT:34.1 sec  Urinalysis Basic - ( 12 Jun 2025 12:30 )    Color: x / Appearance: x / SG: x / pH: x  Gluc: 85 mg/dL / Ketone: x  / Bili: x / Urobili: x   Blood: x / Protein: x / Nitrite: x   Leuk Esterase: x / RBC: x / WBC x   Sq Epi: x / Non Sq Epi: x / Bacteria: x          RADIOLOGY & ADDITIONAL TESTS:  Reviewed    ******************

## 2025-06-13 NOTE — CONSULT NOTE ADULT - ASSESSMENT
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 Hematology consulted for Factor IX Deficiency.     #Factor IX Deficiency  - Hematology History: Follows Hematologist Dr. Gutierrez for Factor IX Deficiency. Initial workup showed elevated PTT, Mixing studies WNL, Factor IX 38, APLS labs negative. Recent labs show Factor IX=38% (4/21/25), repeat Factor IX=37% (5/14/25). s/p planned left ureteroscopy; laser lithotripsy on 5/22/25. He dad tooth extraction over 20 years ago without any significant bleeding. No history of VTE.   - CT Renal stone hunt (6/7/25) showed left-sided hyperdense material seen within left lower pole calyces on the left renal pelvis, likely related to blood products/clot. There is associated perinephric stranding and hydronephrosis suggesting associated obstructive uropathy. Superimposed pyelonephritis/infection may appear similar. Nonobstructing bilateral renal stones. Urinary bladder wall thickening which may represent sequelae of chronic outlet obstruction from prostate enlargement or cystitis. Multiloculated cystic structure seen along the right epicardial space. Differential considerations include an atypical pericardial cyst or possibly lymphatic lesion. This appears relatively stable in size compared to 2/27/2025.   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 Hematology consulted for Factor IX Deficiency.     #Factor IX Deficiency  - Hematology History: Follows Hematologist Dr. Gutierrez for Factor IX Deficiency. Initial workup showed elevated PTT, Mixing studies WNL, Factor IX 38, APLS labs negative. Recent labs show Factor IX=38% (4/21/25), repeat Factor IX=37% (5/14/25). s/p planned left ureteroscopy; laser lithotripsy on 5/22/25. He dad tooth extraction over 20 years ago without any significant bleeding. No history of VTE.   - CT Renal stone hunt (6/7/25) showed left-sided hyperdense material seen within left lower pole calyces on the left renal pelvis, likely related to blood products/clot. There is associated perinephric stranding and hydronephrosis suggesting associated obstructive uropathy. Superimposed pyelonephritis/infection may appear similar. Nonobstructing bilateral renal stones. Urinary bladder wall thickening which may represent sequelae of chronic outlet obstruction from prostate enlargement or cystitis. Multiloculated cystic structure seen along the right epicardial space. Differential considerations include an atypical pericardial cyst or possibly lymphatic lesion. This appears relatively stable in size compared to 2/27/2025.   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 Hematology consulted for Factor IX Deficiency.     #Factor IX Deficiency  - Hematology History: Follows Hematologist Dr. Gutierrez for Factor IX Deficiency. Initial workup showed elevated PTT, Mixing studies WNL, Factor IX 38, APLS labs negative. Recent labs show Factor IX=38% (4/21/25), repeat Factor IX=37% (5/14/25). s/p planned left ureteroscopy; laser lithotripsy on 5/22/25. He dad tooth extraction over 20 years ago without any significant bleeding. No history of VTE.   - CBC (6/13/25): wbc:8.07k, hgb=10.5, snt=337  - CT Renal stone hunt (6/7/25) showed left-sided hyperdense material seen within left lower pole calyces on the left renal pelvis, likely related to blood products/clot. There is associated perinephric stranding and hydronephrosis suggesting associated obstructive uropathy. Superimposed pyelonephritis/infection may appear similar. Nonobstructing bilateral renal stones. Urinary bladder wall thickening which may represent sequelae of chronic outlet obstruction from prostate enlargement or cystitis. Multiloculated cystic structure seen along the right epicardial space. Differential considerations include an atypical pericardial cyst or possibly lymphatic lesion. This appears relatively stable in size compared to 2/27/2025.   - Appreciate Urology recs - continue CBI,  plan for OR tomorrow cystoscopy, clot evacuation, and ureteroscopy with CVAC, tentatively 6/14/25    Recommend:  - Follow up Factor IX Assay now (sent)  - Plan to give Benefix 1500 Units one hour prior to Urology procedure on 6/13/25, with goal Factor IX level 50%. Please call the Hematology fellow on call in the morning to coordinate administration prior to the procedure. Blood Bank aware and confirmed Benefix is available.  - Following administration of Benefix, repeat Factor IX Assay 5-15 minutes after dose given (peak factor activity)   - Will calculate subsequent doses of Benefix following procedure based on factor levels. Plan for second dose of Benefix to be given approximately 18-24 hours after first dose.  - Trend CBC, and monitor for ongoing hematuria  - Discussed that Hemophilia B is a hereditary condition due to a genetic mutation in the X chromosome, so the disease predominantly affects males, and females can be carriers of the disease. Recommended genetic evaluation for his children when they are young adults  - Once medically stable for discharge, follow up with Hematologist Dr. Gutierrez     Patient seen and evaluated with Dr. Lavern Gan, PGY-5  Fellow Hematology/Oncology  pager [phone]  Available on TEAMS  After 5pm or on weekends please contact  to page on-call fellow

## 2025-06-13 NOTE — DISCHARGE NOTE PROVIDER - NSDCMRMEDTOKEN_GEN_ALL_CORE_FT
escitalopram 10 mg oral tablet: 1 tab(s) orally once a day (in the morning)  pantoprazole 40 mg oral delayed release tablet: 1 tab(s) orally once a day (in the morning)  rosuvastatin 20 mg oral tablet: 1 tab(s) orally once a day (at bedtime)   ciprofloxacin 500 mg oral tablet: 1 tab(s) orally 2 times a day  escitalopram 10 mg oral tablet: 1 tab(s) orally once a day (in the morning)  pantoprazole 40 mg oral delayed release tablet: 1 tab(s) orally once a day (in the morning)  rosuvastatin 20 mg oral tablet: 1 tab(s) orally once a day (at bedtime)

## 2025-06-13 NOTE — CHART NOTE - NSCHARTNOTEFT_GEN_A_CORE
Patient continues to be on CBI with CBI running clear on moderate drip. Patients H/H has been largely stable but since he has required CBI for multiple days with CT showing clot in the upper tracts will plan for OR tomorrow cystoscopy, clot evacuation, and ureteroscopy with CVAC. Please pre-op patient with NPO s/p midnight, document clearances, AM labs (CBC/BMP/Coags), and optimize patient from clotting factor perspective. Will consent patient later today and continue to monitor urine color.    [facility]  [address] [place] [address], [address]  [place] [address]  [phone]

## 2025-06-13 NOTE — PROGRESS NOTE ADULT - ASSESSMENT
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 Pt was seen by urology and started on CBI. Urology recommended cystoscopy/ureteroscopy with possible clot evacuation. He was seen by hematology and was recommended to transfer to [facility] given Factor IX deficiency with need for procedure. Pt admitted for possibly cystoscopy/ureteroscopy w/ possible clot evacuation with need for possible Factor IX infusion prior to procedure.

## 2025-06-13 NOTE — DISCHARGE NOTE PROVIDER - NSDCCPTREATMENT_GEN_ALL_CORE_FT
PRINCIPAL PROCEDURE  Procedure: Cystoscopy  Findings and Treatment: General cystoscopy, left retrograde pyelogram, left ureteroscopy  Specimen: none  EBL: 0 milliLiter(s)  Condition: stable  pacu > floor > home        SECONDARY PROCEDURE  Procedure: CT abdomen  Findings and Treatment: IMPRESSION:  *  Mild left hydronephrosis with unchanged hyperdense material within the collecting system and upper ureter, likely blood products.  *  No hyperdense clot is identified in the bladder.  *  Bladder wall thickening versus underdistention, which may relatedto   reactive.

## 2025-06-13 NOTE — PROGRESS NOTE ADULT - PROBLEM SELECTOR PLAN 3
aspirin currently on hold in setting of hematuria  - c/w home statin - baseline Cr in the 1.1s, fluctuant Cr here between 1.1 and 1.6  - likely a degree of obstructive PRICILLA iso clot seen on CT scan abdomen with degree of hydronephrosis   - continue CBI and trend CMP   - avoid nephrotoxic drugs

## 2025-06-13 NOTE — H&P ADULT - ASSESSMENT
39 y/o M with history of factor IX deficiency, CAD, HLD initially presenting to  ED for evaluation of recurrent hematuria. Pt was seen by urology and started on CBI. Urology recommended cystoscopy/ureteroscopy with possible clot evacuation. He was seen by hematology and was recommended to transfer to [facility] given Factor IX deficiency with need for procedure.  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 Pt was seen by urology and started on CBI. Urology recommended cystoscopy/ureteroscopy with possible clot evacuation. He was seen by hematology and was recommended to transfer to [facility] given Factor IX deficiency with need for procedure. Pt admitted for possibly cystoscopy/ureteroscopy w/ possible clot evacuation with need for possible Factor IX infusion prior to procedure.

## 2025-06-14 ENCOUNTER — TRANSCRIPTION ENCOUNTER (OUTPATIENT)
Age: 40
End: 2025-06-14

## 2025-06-14 LAB
ALBUMIN SERPL ELPH-MCNC: 4 G/DL — SIGNIFICANT CHANGE UP (ref 3.3–5)
ALP SERPL-CCNC: 50 U/L — SIGNIFICANT CHANGE UP (ref 40–120)
ALT FLD-CCNC: 10 U/L — SIGNIFICANT CHANGE UP (ref 4–41)
ANION GAP SERPL CALC-SCNC: 12 MMOL/L — SIGNIFICANT CHANGE UP (ref 7–14)
APTT BLD: 34 SEC — SIGNIFICANT CHANGE UP (ref 26.1–36.8)
AST SERPL-CCNC: 17 U/L — SIGNIFICANT CHANGE UP (ref 4–40)
BASOPHILS # BLD AUTO: 0.04 K/UL — SIGNIFICANT CHANGE UP (ref 0–0.2)
BASOPHILS NFR BLD AUTO: 0.5 % — SIGNIFICANT CHANGE UP (ref 0–2)
BILIRUB SERPL-MCNC: 0.3 MG/DL — SIGNIFICANT CHANGE UP (ref 0.2–1.2)
BLD GP AB SCN SERPL QL: NEGATIVE — SIGNIFICANT CHANGE UP
BUN SERPL-MCNC: 11 MG/DL — SIGNIFICANT CHANGE UP (ref 7–23)
CALCIUM SERPL-MCNC: 9.4 MG/DL — SIGNIFICANT CHANGE UP (ref 8.4–10.5)
CHLORIDE SERPL-SCNC: 102 MMOL/L — SIGNIFICANT CHANGE UP (ref 98–107)
CO2 SERPL-SCNC: 26 MMOL/L — SIGNIFICANT CHANGE UP (ref 22–31)
CREAT SERPL-MCNC: 1.36 MG/DL — HIGH (ref 0.5–1.3)
EGFR: 67 ML/MIN/1.73M2 — SIGNIFICANT CHANGE UP
EGFR: 67 ML/MIN/1.73M2 — SIGNIFICANT CHANGE UP
EOSINOPHIL # BLD AUTO: 0.18 K/UL — SIGNIFICANT CHANGE UP (ref 0–0.5)
EOSINOPHIL NFR BLD AUTO: 2.2 % — SIGNIFICANT CHANGE UP (ref 0–6)
GLUCOSE SERPL-MCNC: 99 MG/DL — SIGNIFICANT CHANGE UP (ref 70–99)
HCT VFR BLD CALC: 34.3 % — LOW (ref 39–50)
HGB BLD-MCNC: 11.5 G/DL — LOW (ref 13–17)
IANC: 5.23 K/UL — SIGNIFICANT CHANGE UP (ref 1.8–7.4)
IMM GRANULOCYTES NFR BLD AUTO: 0.2 % — SIGNIFICANT CHANGE UP (ref 0–0.9)
INR BLD: 1.15 RATIO — SIGNIFICANT CHANGE UP (ref 0.85–1.16)
LYMPHOCYTES # BLD AUTO: 2.11 K/UL — SIGNIFICANT CHANGE UP (ref 1–3.3)
LYMPHOCYTES # BLD AUTO: 26.1 % — SIGNIFICANT CHANGE UP (ref 13–44)
MAGNESIUM SERPL-MCNC: 2.1 MG/DL — SIGNIFICANT CHANGE UP (ref 1.6–2.6)
MCHC RBC-ENTMCNC: 31.2 PG — SIGNIFICANT CHANGE UP (ref 27–34)
MCHC RBC-ENTMCNC: 33.5 G/DL — SIGNIFICANT CHANGE UP (ref 32–36)
MCV RBC AUTO: 93 FL — SIGNIFICANT CHANGE UP (ref 80–100)
MONOCYTES # BLD AUTO: 0.49 K/UL — SIGNIFICANT CHANGE UP (ref 0–0.9)
MONOCYTES NFR BLD AUTO: 6.1 % — SIGNIFICANT CHANGE UP (ref 2–14)
NEUTROPHILS # BLD AUTO: 5.23 K/UL — SIGNIFICANT CHANGE UP (ref 1.8–7.4)
NEUTROPHILS NFR BLD AUTO: 64.9 % — SIGNIFICANT CHANGE UP (ref 43–77)
NRBC # BLD AUTO: 0 K/UL — SIGNIFICANT CHANGE UP (ref 0–0)
NRBC # FLD: 0 K/UL — SIGNIFICANT CHANGE UP (ref 0–0)
NRBC BLD AUTO-RTO: 0 /100 WBCS — SIGNIFICANT CHANGE UP (ref 0–0)
PHOSPHATE SERPL-MCNC: 3.2 MG/DL — SIGNIFICANT CHANGE UP (ref 2.5–4.5)
PLATELET # BLD AUTO: 374 K/UL — SIGNIFICANT CHANGE UP (ref 150–400)
POTASSIUM SERPL-MCNC: 4.4 MMOL/L — SIGNIFICANT CHANGE UP (ref 3.5–5.3)
POTASSIUM SERPL-SCNC: 4.4 MMOL/L — SIGNIFICANT CHANGE UP (ref 3.5–5.3)
PROT SERPL-MCNC: 7 G/DL — SIGNIFICANT CHANGE UP (ref 6–8.3)
PROTHROM AB SERPL-ACNC: 13.7 SEC — HIGH (ref 9.9–13.4)
RBC # BLD: 3.69 M/UL — LOW (ref 4.2–5.8)
RBC # FLD: 11.4 % — SIGNIFICANT CHANGE UP (ref 10.3–14.5)
RH IG SCN BLD-IMP: POSITIVE — SIGNIFICANT CHANGE UP
SODIUM SERPL-SCNC: 140 MMOL/L — SIGNIFICANT CHANGE UP (ref 135–145)
WBC # BLD: 8.07 K/UL — SIGNIFICANT CHANGE UP (ref 3.8–10.5)
WBC # FLD AUTO: 8.07 K/UL — SIGNIFICANT CHANGE UP (ref 3.8–10.5)

## 2025-06-14 PROCEDURE — 52351 CYSTOURETERO & OR PYELOSCOPE: CPT | Mod: LT

## 2025-06-14 PROCEDURE — 99233 SBSQ HOSP IP/OBS HIGH 50: CPT | Mod: GC

## 2025-06-14 PROCEDURE — 74420 UROGRAPHY RTRGR +-KUB: CPT | Mod: 26,LT

## 2025-06-14 PROCEDURE — 93010 ELECTROCARDIOGRAM REPORT: CPT

## 2025-06-14 DEVICE — URETERAL SHEATH NAVIGATOR HD 12/14FR X 46CM: Type: IMPLANTABLE DEVICE | Status: FUNCTIONAL

## 2025-06-14 DEVICE — GUIDEWIRE SENSOR DUAL-FLEX NITINOL STRAIGHT .038" X 150CM: Type: IMPLANTABLE DEVICE | Status: FUNCTIONAL

## 2025-06-14 DEVICE — URETERAL CATH OPEN END 5FR 70CM: Type: IMPLANTABLE DEVICE | Status: FUNCTIONAL

## 2025-06-14 RX ORDER — HYDROMORPHONE/SOD CHLOR,ISO/PF 2 MG/10 ML
0.5 SYRINGE (ML) INJECTION
Refills: 0 | Status: DISCONTINUED | OUTPATIENT
Start: 2025-06-14 | End: 2025-06-16

## 2025-06-14 RX ORDER — ONDANSETRON HCL/PF 4 MG/2 ML
4 VIAL (ML) INJECTION ONCE
Refills: 0 | Status: DISCONTINUED | OUTPATIENT
Start: 2025-06-14 | End: 2025-06-14

## 2025-06-14 RX ORDER — SULFAMETHOXAZOLE/TRIMETHOPRIM 800-160 MG
1 TABLET ORAL EVERY 12 HOURS
Refills: 0 | Status: DISCONTINUED | OUTPATIENT
Start: 2025-06-14 | End: 2025-06-15

## 2025-06-14 RX ORDER — HYDROMORPHONE/SOD CHLOR,ISO/PF 2 MG/10 ML
0.5 SYRINGE (ML) INJECTION
Refills: 0 | Status: DISCONTINUED | OUTPATIENT
Start: 2025-06-14 | End: 2025-06-14

## 2025-06-14 RX ORDER — HYDROMORPHONE/SOD CHLOR,ISO/PF 2 MG/10 ML
1 SYRINGE (ML) INJECTION
Refills: 0 | Status: DISCONTINUED | OUTPATIENT
Start: 2025-06-14 | End: 2025-06-14

## 2025-06-14 RX ORDER — OXYCODONE HYDROCHLORIDE 30 MG/1
5 TABLET ORAL ONCE
Refills: 0 | Status: DISCONTINUED | OUTPATIENT
Start: 2025-06-14 | End: 2025-06-16

## 2025-06-14 RX ADMIN — ESCITALOPRAM OXALATE 10 MILLIGRAM(S): 20 TABLET ORAL at 10:27

## 2025-06-14 RX ADMIN — ROSUVASTATIN CALCIUM 20 MILLIGRAM(S): 20 TABLET, FILM COATED ORAL at 21:45

## 2025-06-14 RX ADMIN — POLYETHYLENE GLYCOL 3350 17 GRAM(S): 17 POWDER, FOR SOLUTION ORAL at 10:26

## 2025-06-14 RX ADMIN — Medication 2 TABLET(S): at 21:45

## 2025-06-14 RX ADMIN — Medication 1 TABLET(S): at 18:59

## 2025-06-14 RX ADMIN — Medication 40 MILLIGRAM(S): at 06:15

## 2025-06-14 NOTE — PROGRESS NOTE ADULT - SUBJECTIVE AND OBJECTIVE BOX
******************  Authored By: Patrick Mejia MD, PGY1  MS Teams Preferred  ******************  INTERVAL HPI/OVERNIGHT EVENTS:  Pt seen and examined at bedside. Overnight concern for more hematuria per nursing. CBC intact and patient without increasing symptoms of pain or fatigue.     Brief Daily Plan:  - plan for benefix 1500u prior to cystoscopy 1hr, will get factor IX assay 15 minutes after administering benefix per heme  - f/u urology timing of cystoscopy today     VITAL SIGNS:  T(F): 97.8 (06-14-25 @ 04:25)  HR: 83 (06-14-25 @ 04:25)  BP: 128/82 (06-14-25 @ 04:25)  RR: 16 (06-14-25 @ 04:25)  SpO2: 98% (06-14-25 @ 04:25)  Wt(kg): --    CAPILLARY BLOOD GLUCOSE    PHYSICAL EXAM:    Constitutional: WDWN, NAD  HEENT: PERRL, EOMI, sclera non-icteric, neck supple, trachea midline, no masses, no JVD, MMM, good dentition  Respiratory: CTA b/l, good air entry b/l, no wheezing, no rhonchi, no rales, without accessory muscle use and no intercostal retractions  Cardiovascular: RRR, normal S1S2, no M/R/G  Gastrointestinal: soft, NTND, no masses palpable, BS normal  Extremities: Warm, well perfused, pulses equal bilateral upper and lower extremities, no edema, no clubbing. Capillary refill <2 sec  Neurological: AAOx3, CN Grossly intact  Skin: Normal temperature, warm, dry    MEDICATIONS  (STANDING):  escitalopram 10 milliGRAM(s) Oral daily  pantoprazole    Tablet 40 milliGRAM(s) Oral before breakfast  polyethylene glycol 3350 17 Gram(s) Oral daily  rosuvastatin 20 milliGRAM(s) Oral at bedtime  senna 2 Tablet(s) Oral at bedtime    MEDICATIONS  (PRN):  acetaminophen     Tablet .. 650 milliGRAM(s) Oral every 6 hours PRN Temp greater or equal to 38C (100.4F), Mild Pain (1 - 3)  HYDROmorphone  Injectable 0.5 milliGRAM(s) IV Push every 6 hours PRN Severe Pain (7 - 10)      Allergies    No Known Allergies    Intolerances        LABS:                        11.5   8.07  )-----------( 374      ( 14 Jun 2025 03:57 )             34.3     06-14    140  |  102  |  11  ----------------------------<  99  4.4   |  26  |  1.36[H]    Ca    9.4      14 Jun 2025 03:57  Phos  3.2     06-14  Mg     2.10     06-14    TPro  7.0  /  Alb  4.0  /  TBili  0.3  /  DBili  x   /  AST  17  /  ALT  10  /  AlkPhos  50  06-14    PT/INR - ( 14 Jun 2025 03:57 )   PT: 13.7 sec;   INR: 1.15 ratio         PTT - ( 14 Jun 2025 03:57 )  PTT:34.0 sec  Urinalysis Basic - ( 14 Jun 2025 03:57 )    Color: x / Appearance: x / SG: x / pH: x  Gluc: 99 mg/dL / Ketone: x  / Bili: x / Urobili: x   Blood: x / Protein: x / Nitrite: x   Leuk Esterase: x / RBC: x / WBC x   Sq Epi: x / Non Sq Epi: x / Bacteria: x          RADIOLOGY & ADDITIONAL TESTS:  Reviewed    ******************

## 2025-06-14 NOTE — PROGRESS NOTE ADULT - PROBLEM SELECTOR PLAN 6
DVT PPx: SCDs iso hematuria   E: replete K<4, Mg<2  GI PPx: Pantoprazole 40 mg   Diet: DASH, NPO MN for cystoscopy tomorrow  PT/OT: none  Code Status: Full  Dispo: pending medical improvement DVT PPx: SCDs iso hematuria   E: replete K<4, Mg<2  GI PPx: Pantoprazole 40 mg   Diet: DASH, NPO for cystoscopy   PT/OT: none  Code Status: Full  Dispo: pending medical improvement

## 2025-06-14 NOTE — PROGRESS NOTE ADULT - PROBLEM SELECTOR PLAN 2
heme consult emailed for possible factor replacement in setting of hematuria and prior to procedure as above   - recently diagnosed within the past few weeks.  - heme recs as above to optimize factor ix prior to cystoscopy

## 2025-06-14 NOTE — PROGRESS NOTE ADULT - ASSESSMENT
39 y/o M with history of factor IX deficiency, CAD, HLD initially presenting to  ED for evaluation of recurrent hematuria. Pt was seen by urology and started on CBI. Urology recommended cystoscopy/ureteroscopy with possible clot evacuation. He was seen by hematology and was recommended to transfer to [facility] given Factor IX deficiency with need for procedure. Pt admitted for possibly cystoscopy/ureteroscopy w/ possible clot evacuation with need for possible Factor IX infusion prior to procedure.  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 Pt was seen by urology and started on CBI. Urology recommended cystoscopy/ureteroscopy with possible clot evacuation. He was seen by hematology and was recommended to transfer to [facility] given Factor IX deficiency with need for procedure. Pt admitted for possibly cystoscopy/ureteroscopy w/ possible clot evacuation with need for possible Factor IX infusion prior to procedure.

## 2025-06-14 NOTE — PROGRESS NOTE ADULT - PROBLEM SELECTOR PLAN 3
- baseline Cr in the 1.1s, fluctuant Cr here between 1.1 and 1.6  - likely a degree of obstructive PRICILLA iso clot seen on CT scan abdomen with degree of hydronephrosis   - continue CBI and trend CMP   - avoid nephrotoxic drugs

## 2025-06-14 NOTE — PROGRESS NOTE ADULT - ASSESSMENT
39yo Male with h/o anxiety, GERD, HLD, CAD, L kidney calcifications s/p cystoscopy, laser incision of left lower pole calyceal narrowing, failed lithotripsy, stent placement (5/22/25, Dylan) with stent removal 5/29/25, gross hematuria with passage of clots as of 6/7, clot retention retaining ~800cc, requiring catheter, irrigation and CBI, newly diagnosed with Factor IX deficiency, transferred from [facility] for further urologic and hematologic management, remains on CBI, running clear, and is hemodynamically stable. WBC 9.9, H/H 11/32.5 from 11.7/35, Cr 1.3 from 1.3, UA (6/11) no growth. CTAP from 6/10 demonstrates mild/moderate L hydronephrosis with some blood product in L collecting system and in dependent portion of bladder.    On 6/14, patient underwent cystoscopy, left retrograde pyelogram, left ureteroscopy due to persistent clot seen on CTAP and because this was patient's 3rd time back in hospital for clot colic. Patient received factor IX prior to OR per heme-onc and in the OR, small fragments of clot were irrigated out of bladder and left collecting system and each renal calyx was inspected to ensure hemostatic and clot-free.     Recommendations:  - No further urologic intervention indicated, ok to discharge home today from  perspective.  - Home with 3d DS bactrim given recent  instrumentation  - Continue to hydrate  - Pink-tinged urine is normal after this procedure  - OTC tylenol and ibuprofen alternating for pain control  - Follow up with Dr. Mendenhall outpatient    Seen and d/w Dr. Mendenhall     39yo Male with h/o anxiety, GERD, HLD, CAD, L kidney calcifications s/p cystoscopy, laser incision of left lower pole calyceal narrowing, failed lithotripsy, stent placement (5/22/25, Dylan) with stent removal 5/29/25, gross hematuria with passage of clots as of 6/7, clot retention retaining ~800cc, requiring catheter, irrigation and CBI, newly diagnosed with Factor IX deficiency, transferred from [facility] for further urologic and hematologic management, remains on CBI, running clear, and is hemodynamically stable. WBC 9.9, H/H 11/32.5 from 11.7/35, Cr 1.3 from 1.3, UA (6/11) no growth. CTAP from 6/10 demonstrates mild/moderate L hydronephrosis with some blood product in L collecting system and in dependent portion of bladder.    On 6/14, patient underwent cystoscopy, left retrograde pyelogram, left ureteroscopy due to persistent clot seen on CTAP and because this was patient's 3rd time back in hospital for clot colic. Patient received factor IX prior to OR per heme-onc and in the OR, small fragments of clot were irrigated out of bladder and left collecting system and each renal calyx was inspected to ensure hemostatic and clot-free. Was left without stent and without newman given minimal trauma to left ureter and excellent urine color.    Recommendations:  - No further urologic intervention indicated, ok to discharge home today from  perspective.  - Home with 3d DS bactrim given recent  instrumentation  - Continue to hydrate  - Pink-tinged urine is normal after this procedure  - OTC tylenol and ibuprofen alternating for pain control  - Follow up with Dr. Mendenhall outpatient    Seen and d/w Dr. Mendenhall

## 2025-06-14 NOTE — PROGRESS NOTE ADULT - PROBLEM SELECTOR PLAN 1
- L kidney calcifications s/p cystoscopy, laser incision of left lower pole calyceal narrowing, failed lithotripsy, stent placement (5/22/25, Dylan) with stent removal 5/29/25, gross hematuria with passage of clots as of 6/7  - Pt w/ hematuria, s/p newman and CBI.   - Ucx negative, pt afebrile w/o leukocytosis, monitoring off abx    PLAN:   - plan for benefix 1500u prior to cystoscopy 1hr, will get factor IX assay 15 minutes after administering benefix per heme  - f/u urology timing of cystoscopy today  - cw newman and CBI.

## 2025-06-14 NOTE — PROGRESS NOTE ADULT - PROBLEM SELECTOR PLAN 4
aspirin currently on hold in setting of hematuria  - c/w home statin aspirin currently on hold in setting of hematuria  - c/w home statin  - ekg 6/14 no acute ST changes

## 2025-06-14 NOTE — PROGRESS NOTE ADULT - SUBJECTIVE AND OBJECTIVE BOX
Subjective:  Resting comfortably    Objective:    Vital signs  T(C): , Max: 36.9 (06-14-25 @ 15:17)  HR: 60 (06-14-25 @ 17:41)  BP: 127/85 (06-14-25 @ 17:41)  SpO2: 98% (06-14-25 @ 17:41)  Wt(kg): --    Output     06-13 @ 07:01  -  06-14 @ 07:00  --------------------------------------------------------  IN: 88621 mL / OUT: 46104 mL / NET: -8000 mL    06-14 @ 07:01  -  06-14 @ 18:07  --------------------------------------------------------  IN: 0 mL / OUT: 1500 mL / NET: -1500 mL        Gen: NAD  Abd: soft, nontender, nondistended  : trent secured in place, draining CYU    Labs                        11.5   8.07  )-----------( 374      ( 14 Jun 2025 03:57 )             34.3     14 Jun 2025 03:57    140    |  102    |  11     ----------------------------<  99     4.4     |  26     |  1.36     Ca    9.4        14 Jun 2025 03:57  Phos  3.2       14 Jun 2025 03:57  Mg     2.10      14 Jun 2025 03:57        Urine Cx: ?  Blood Cx: ?      Imaging Subjective:  Resting comfortably    Objective:    Vital signs  T(C): , Max: 36.9 (06-14-25 @ 15:17)  HR: 60 (06-14-25 @ 17:41)  BP: 127/85 (06-14-25 @ 17:41)  SpO2: 98% (06-14-25 @ 17:41)  Wt(kg): --    Output     06-13 @ 07:01  -  06-14 @ 07:00  --------------------------------------------------------  IN: 80512 mL / OUT: 41616 mL / NET: -8000 mL    06-14 @ 07:01  -  06-14 @ 18:07  --------------------------------------------------------  IN: 0 mL / OUT: 1500 mL / NET: -1500 mL        Gen: NAD  Abd: soft, nontender, nondistended  : voiding independently    Labs                        11.5   8.07  )-----------( 374      ( 14 Jun 2025 03:57 )             34.3     14 Jun 2025 03:57    140    |  102    |  11     ----------------------------<  99     4.4     |  26     |  1.36     Ca    9.4        14 Jun 2025 03:57  Phos  3.2       14 Jun 2025 03:57  Mg     2.10      14 Jun 2025 03:57        Urine Cx: ?  Blood Cx: ?      Imaging

## 2025-06-15 DIAGNOSIS — A41.9 SEPSIS, UNSPECIFIED ORGANISM: ICD-10-CM

## 2025-06-15 LAB
ALBUMIN SERPL ELPH-MCNC: 3.7 G/DL — SIGNIFICANT CHANGE UP (ref 3.3–5)
ALP SERPL-CCNC: 49 U/L — SIGNIFICANT CHANGE UP (ref 40–120)
ALT FLD-CCNC: 11 U/L — SIGNIFICANT CHANGE UP (ref 4–41)
ANION GAP SERPL CALC-SCNC: 14 MMOL/L — SIGNIFICANT CHANGE UP (ref 7–14)
APPEARANCE UR: CLEAR — SIGNIFICANT CHANGE UP
AST SERPL-CCNC: 15 U/L — SIGNIFICANT CHANGE UP (ref 4–40)
BACTERIA # UR AUTO: NEGATIVE /HPF — SIGNIFICANT CHANGE UP
BASOPHILS # BLD AUTO: 0.04 K/UL — SIGNIFICANT CHANGE UP (ref 0–0.2)
BASOPHILS NFR BLD AUTO: 0.3 % — SIGNIFICANT CHANGE UP (ref 0–2)
BILIRUB SERPL-MCNC: 0.3 MG/DL — SIGNIFICANT CHANGE UP (ref 0.2–1.2)
BILIRUB UR-MCNC: NEGATIVE — SIGNIFICANT CHANGE UP
BUN SERPL-MCNC: 14 MG/DL — SIGNIFICANT CHANGE UP (ref 7–23)
CALCIUM SERPL-MCNC: 8.9 MG/DL — SIGNIFICANT CHANGE UP (ref 8.4–10.5)
CAST: 0 /LPF — SIGNIFICANT CHANGE UP (ref 0–4)
CHLORIDE SERPL-SCNC: 97 MMOL/L — LOW (ref 98–107)
CO2 SERPL-SCNC: 23 MMOL/L — SIGNIFICANT CHANGE UP (ref 22–31)
COLOR SPEC: YELLOW — SIGNIFICANT CHANGE UP
CREAT SERPL-MCNC: 1.24 MG/DL — SIGNIFICANT CHANGE UP (ref 0.5–1.3)
DIFF PNL FLD: ABNORMAL
EGFR: 74 ML/MIN/1.73M2 — SIGNIFICANT CHANGE UP
EGFR: 74 ML/MIN/1.73M2 — SIGNIFICANT CHANGE UP
EOSINOPHIL # BLD AUTO: 0.1 K/UL — SIGNIFICANT CHANGE UP (ref 0–0.5)
EOSINOPHIL NFR BLD AUTO: 0.8 % — SIGNIFICANT CHANGE UP (ref 0–6)
GLUCOSE SERPL-MCNC: 170 MG/DL — HIGH (ref 70–99)
GLUCOSE UR QL: NEGATIVE MG/DL — SIGNIFICANT CHANGE UP
HCT VFR BLD CALC: 33.5 % — LOW (ref 39–50)
HGB BLD-MCNC: 11.3 G/DL — LOW (ref 13–17)
IANC: 10.84 K/UL — HIGH (ref 1.8–7.4)
IMM GRANULOCYTES NFR BLD AUTO: 0.3 % — SIGNIFICANT CHANGE UP (ref 0–0.9)
KETONES UR QL: NEGATIVE MG/DL — SIGNIFICANT CHANGE UP
LEUKOCYTE ESTERASE UR-ACNC: ABNORMAL
LYMPHOCYTES # BLD AUTO: 1.44 K/UL — SIGNIFICANT CHANGE UP (ref 1–3.3)
LYMPHOCYTES # BLD AUTO: 11.1 % — LOW (ref 13–44)
MAGNESIUM SERPL-MCNC: 1.8 MG/DL — SIGNIFICANT CHANGE UP (ref 1.6–2.6)
MCHC RBC-ENTMCNC: 31.1 PG — SIGNIFICANT CHANGE UP (ref 27–34)
MCHC RBC-ENTMCNC: 33.7 G/DL — SIGNIFICANT CHANGE UP (ref 32–36)
MCV RBC AUTO: 92.3 FL — SIGNIFICANT CHANGE UP (ref 80–100)
MONOCYTES # BLD AUTO: 0.55 K/UL — SIGNIFICANT CHANGE UP (ref 0–0.9)
MONOCYTES NFR BLD AUTO: 4.2 % — SIGNIFICANT CHANGE UP (ref 2–14)
MRSA PCR RESULT.: SIGNIFICANT CHANGE UP
NEUTROPHILS # BLD AUTO: 10.84 K/UL — HIGH (ref 1.8–7.4)
NEUTROPHILS NFR BLD AUTO: 83.3 % — HIGH (ref 43–77)
NITRITE UR-MCNC: NEGATIVE — SIGNIFICANT CHANGE UP
NRBC # BLD AUTO: 0 K/UL — SIGNIFICANT CHANGE UP (ref 0–0)
NRBC # FLD: 0 K/UL — SIGNIFICANT CHANGE UP (ref 0–0)
NRBC BLD AUTO-RTO: 0 /100 WBCS — SIGNIFICANT CHANGE UP (ref 0–0)
PH UR: 7.5 — SIGNIFICANT CHANGE UP (ref 5–8)
PHOSPHATE SERPL-MCNC: 2.4 MG/DL — LOW (ref 2.5–4.5)
PLATELET # BLD AUTO: 426 K/UL — HIGH (ref 150–400)
POTASSIUM SERPL-MCNC: 3.9 MMOL/L — SIGNIFICANT CHANGE UP (ref 3.5–5.3)
POTASSIUM SERPL-SCNC: 3.9 MMOL/L — SIGNIFICANT CHANGE UP (ref 3.5–5.3)
PROT SERPL-MCNC: 6.8 G/DL — SIGNIFICANT CHANGE UP (ref 6–8.3)
PROT UR-MCNC: SIGNIFICANT CHANGE UP MG/DL
RBC # BLD: 3.63 M/UL — LOW (ref 4.2–5.8)
RBC # FLD: 11.4 % — SIGNIFICANT CHANGE UP (ref 10.3–14.5)
RBC CASTS # UR COMP ASSIST: 7 /HPF — HIGH (ref 0–4)
S AUREUS DNA NOSE QL NAA+PROBE: SIGNIFICANT CHANGE UP
SODIUM SERPL-SCNC: 134 MMOL/L — LOW (ref 135–145)
SP GR SPEC: 1.01 — SIGNIFICANT CHANGE UP (ref 1–1.03)
SQUAMOUS # UR AUTO: 0 /HPF — SIGNIFICANT CHANGE UP (ref 0–5)
UROBILINOGEN FLD QL: 0.2 MG/DL — SIGNIFICANT CHANGE UP (ref 0.2–1)
WBC # BLD: 13.01 K/UL — HIGH (ref 3.8–10.5)
WBC # FLD AUTO: 13.01 K/UL — HIGH (ref 3.8–10.5)
WBC UR QL: 2 /HPF — SIGNIFICANT CHANGE UP (ref 0–5)

## 2025-06-15 PROCEDURE — 99233 SBSQ HOSP IP/OBS HIGH 50: CPT | Mod: GC

## 2025-06-15 RX ORDER — CEFTRIAXONE 500 MG/1
1000 INJECTION, POWDER, FOR SOLUTION INTRAMUSCULAR; INTRAVENOUS EVERY 24 HOURS
Refills: 0 | Status: DISCONTINUED | OUTPATIENT
Start: 2025-06-15 | End: 2025-06-16

## 2025-06-15 RX ADMIN — Medication 650 MILLIGRAM(S): at 00:59

## 2025-06-15 RX ADMIN — Medication 1 TABLET(S): at 06:10

## 2025-06-15 RX ADMIN — Medication 650 MILLIGRAM(S): at 02:00

## 2025-06-15 RX ADMIN — ROSUVASTATIN CALCIUM 20 MILLIGRAM(S): 20 TABLET, FILM COATED ORAL at 22:35

## 2025-06-15 RX ADMIN — Medication 2 TABLET(S): at 22:35

## 2025-06-15 RX ADMIN — Medication 40 MILLIGRAM(S): at 06:10

## 2025-06-15 RX ADMIN — CEFTRIAXONE 100 MILLIGRAM(S): 500 INJECTION, POWDER, FOR SOLUTION INTRAMUSCULAR; INTRAVENOUS at 12:15

## 2025-06-15 RX ADMIN — ESCITALOPRAM OXALATE 10 MILLIGRAM(S): 20 TABLET ORAL at 12:16

## 2025-06-15 RX ADMIN — POLYETHYLENE GLYCOL 3350 17 GRAM(S): 17 POWDER, FOR SOLUTION ORAL at 12:16

## 2025-06-15 NOTE — PROGRESS NOTE ADULT - ASSESSMENT
39yo Male with h/o anxiety, GERD, HLD, CAD, L kidney calcifications s/p cystoscopy, laser incision of left lower pole calyceal narrowing, failed lithotripsy, stent placement (5/22/25, Dylan) with stent removal 5/29/25, gross hematuria with passage of clots as of 6/7, clot retention retaining ~800cc, requiring catheter, irrigation and CBI, newly diagnosed with Factor IX deficiency, transferred from [facility] for further urologic and hematologic management, remains on CBI, running clear, and is hemodynamically stable. WBC 9.9, H/H 11/32.5 from 11.7/35, Cr 1.3 from 1.3, UA (6/11) no growth. CTAP from 6/10 demonstrates mild/moderate L hydronephrosis with some blood product in L collecting system and in dependent portion of bladder.    6/14: Patient underwent cystoscopy, left retrograde pyelogram, left ureteroscopy due to persistent clot seen on CTAP and because this was patient's 3rd time back in hospital for clot colic. Patient received factor IX prior to OR per heme-onc and in the OR, small fragments of clot were irrigated out of bladder and left collecting system and each renal calyx was inspected to ensure hemostatic and clot-free. Was left without stent and without newman given minimal trauma to left ureter and excellent urine color.  6/15:     Recommendations:  - No further urologic intervention indicated, ok to discharge home from  perspective  - Home with 3d DS bactrim given recent  instrumentation  - Continue to hydrate  - Pink-tinged urine is normal after this procedure  - OTC tylenol and ibuprofen alternating for pain control  - Follow up with Dr. Mendenhall outpatient   41yo Male with h/o anxiety, GERD, HLD, CAD, L kidney calcifications s/p cystoscopy, laser incision of left lower pole calyceal narrowing, failed lithotripsy, stent placement (5/22/25, Dylan) with stent removal 5/29/25, gross hematuria with passage of clots as of 6/7, clot retention retaining ~800cc, requiring catheter, irrigation and CBI, newly diagnosed with Factor IX deficiency, transferred from [facility] for further urologic and hematologic management, remains on CBI, running clear, and is hemodynamically stable. WBC 9.9, H/H 11/32.5 from 11.7/35, Cr 1.3 from 1.3, UA (6/11) no growth. CTAP from 6/10 demonstrates mild/moderate L hydronephrosis with some blood product in L collecting system and in dependent portion of bladder.    6/14: Patient underwent cystoscopy, left retrograde pyelogram, left ureteroscopy due to persistent clot seen on CTAP and because this was patient's 3rd time back in hospital for clot colic. Patient received factor IX prior to OR per heme-onc and in the OR, small fragments of clot were irrigated out of bladder and left collecting system and each renal calyx was inspected to ensure hemostatic and clot-free. Was left without stent and without newman given minimal trauma to left ureter and excellent urine color.  6/15: febrile 101.2 at 1am, given tylenol, voiding clear yellow urine without clot, feels better.    Recommendations:  - No further urologic intervention indicated, ok to discharge home from  perspective  - Home with 3d DS bactrim given recent  instrumentation  - Continue to hydrate  - Pink-tinged urine is normal after this procedure  - OTC tylenol and ibuprofen alternating for pain control  - Follow up with Dr. Richardson-Cameron outpatient   41yo Male with h/o anxiety, GERD, HLD, CAD, L kidney calcifications s/p cystoscopy, laser incision of left lower pole calyceal narrowing, failed lithotripsy, stent placement (5/22/25, Dylan) with stent removal 5/29/25, gross hematuria with passage of clots as of 6/7, clot retention retaining ~800cc, requiring catheter, irrigation and CBI, newly diagnosed with Factor IX deficiency, transferred from [facility] for further urologic and hematologic management, remains on CBI, running clear, and is hemodynamically stable. WBC 9.9, H/H 11/32.5 from 11.7/35, Cr 1.3 from 1.3, UA (6/11) no growth. CTAP from 6/10 demonstrates mild/moderate L hydronephrosis with some blood product in L collecting system and in dependent portion of bladder.    6/14: Patient underwent cystoscopy, left retrograde pyelogram, left ureteroscopy due to persistent clot seen on CTAP and because this was patient's 3rd time back in hospital for clot colic. Patient received factor IX prior to OR per heme-onc and in the OR, small fragments of clot were irrigated out of bladder and left collecting system and each renal calyx was inspected to ensure hemostatic and clot-free. Was left without stent and without newman given minimal trauma to left ureter and excellent urine color.  6/15: febrile 101.2 at 1am, given tylenol, voiding clear yellow urine without clot, feels better.    Recommendations:  - No further urologic intervention indicated, should be d/c'ed with 7d course of ciprofloxacin   - Fever w/u per primary team > mild leukocytosis is expected after recent instrumentation, UA does not look grossly positive but ddx includes  source of infection from recent  procedure vs other fever etiology  - Continue to hydrate  - Pink-tinged urine is normal after this procedure  - OTC tylenol and ibuprofen alternating for pain control  - Follow up with Dr. Mendenhall outpatient    D/w Dr. Mendenhall

## 2025-06-15 NOTE — PROGRESS NOTE ADULT - PROBLEM SELECTOR PLAN 3
- baseline Cr in the 1.1s, fluctuant Cr here between 1.1 and 1.6  - likely a degree of obstructive PRICILLA iso clot seen on CT scan abdomen with degree of hydronephrosis   - continue CBI and trend CMP   - avoid nephrotoxic drugs heme consult emailed for possible factor replacement in setting of hematuria and prior to procedure as above   - recently diagnosed within the past few weeks.  - heme recs as above to optimize factor ix prior to cystoscopy

## 2025-06-15 NOTE — PROGRESS NOTE ADULT - PROBLEM SELECTOR PLAN 4
aspirin currently on hold in setting of hematuria  - c/w home statin  - ekg 6/14 no acute ST changes - baseline Cr in the 1.1s, fluctuant Cr here between 1.1 and 1.6  - likely a degree of obstructive PRICILLA iso clot seen on CT scan abdomen with degree of hydronephrosis   - continue CBI and trend CMP   - avoid nephrotoxic drugs

## 2025-06-15 NOTE — PROVIDER CONTACT NOTE (OTHER) - REASON
hematuria noted in newman
patient complaining of chills. temp=101.2, PA=071, /66
dark red hematuria noted in newman after bladder irrigation stopped by urology

## 2025-06-15 NOTE — PROGRESS NOTE ADULT - TIME BILLING
- Ordering, reviewing, and interpreting labs, testing, and imaging.  - Independently obtaining a review of systems and performing a physical exam  - Reviewing prior hospitalization and where necessary, outpatient records.  - Reviewing consultant recommendations/communicating with consultants  - Counselling and educating patient and family regarding interpretation of aforementioned items and plan of care.
preparing to see the patient (eg. review of tests), obtaining and/or reviewing separately obtained history, obtaining/reviewing vitals, performing a medically appropriate examination and/or evaluation, counseling and educating the patient/family/caregiver, reviewing previous notes and test results, and procedures, communicating with other health professionals (when not separately reported), and documenting clinical information in the electronic health record.
- Ordering, reviewing, and interpreting labs, testing, and imaging.  - Independently obtaining a review of systems and performing a physical exam  - Reviewing prior hospitalization and where necessary, outpatient records.  - Reviewing consultant recommendations/communicating with consultants  - Counselling and educating patient and family regarding interpretation of aforementioned items and plan of care.

## 2025-06-15 NOTE — PROGRESS NOTE ADULT - PROBLEM SELECTOR PLAN 2
heme consult emailed for possible factor replacement in setting of hematuria and prior to procedure as above   - recently diagnosed within the past few weeks.  - heme recs as above to optimize factor ix prior to cystoscopy - L kidney calcifications s/p cystoscopy, laser incision of left lower pole calyceal narrowing, failed lithotripsy, stent placement (5/22/25, Dylan) with stent removal 5/29/25, gross hematuria with passage of clots as of 6/7  - Pt w/ hematuria, s/p newman and CBI.   - Ucx negative, pt afebrile w/o leukocytosis, monitoring off abx    PLAN:   - plan for benefix 1500u prior to cystoscopy 1hr, will get factor IX assay 15 minutes after administering benefix per heme  - f/u urology timing of cystoscopy today  - cw newman and CBI  - will f/u heme regarding more benefix following procedure as per their note

## 2025-06-15 NOTE — PROGRESS NOTE ADULT - PROBLEM SELECTOR PLAN 5
c/w escitalopram 10 mg QD aspirin currently on hold in setting of hematuria  - c/w home statin  - ekg 6/14 no acute ST changes

## 2025-06-15 NOTE — PROGRESS NOTE ADULT - PROBLEM SELECTOR PLAN 1
- L kidney calcifications s/p cystoscopy, laser incision of left lower pole calyceal narrowing, failed lithotripsy, stent placement (5/22/25, Dylan) with stent removal 5/29/25, gross hematuria with passage of clots as of 6/7  - Pt w/ hematuria, s/p newman and CBI.   - Ucx negative, pt afebrile w/o leukocytosis, monitoring off abx    PLAN:   - plan for benefix 1500u prior to cystoscopy 1hr, will get factor IX assay 15 minutes after administering benefix per heme  - f/u urology timing of cystoscopy today  - cw newman and CBI. Fever + WBC rise following procedure.   - on Bactrim as per urology   - infectious workup sent

## 2025-06-15 NOTE — PROGRESS NOTE ADULT - ASSESSMENT
39 y/o M with history of factor IX deficiency, CAD, HLD initially presenting to  ED for evaluation of recurrent hematuria. Pt was seen by urology and started on CBI. Urology recommended cystoscopy/ureteroscopy with possible clot evacuation. He was seen by hematology and was recommended to transfer to [facility] given Factor IX deficiency with need for procedure. Pt admitted for possibly cystoscopy/ureteroscopy w/ possible clot evacuation with need for possible Factor IX infusion prior to procedure.

## 2025-06-15 NOTE — PROGRESS NOTE ADULT - PROBLEM SELECTOR PLAN 6
DVT PPx: SCDs iso hematuria   E: replete K<4, Mg<2  GI PPx: Pantoprazole 40 mg   Diet: DASH, NPO for cystoscopy   PT/OT: none  Code Status: Full  Dispo: pending medical improvement c/w escitalopram 10 mg QD

## 2025-06-15 NOTE — PROVIDER CONTACT NOTE (OTHER) - BACKGROUND
dx: evaluation of recurrent hematuria
dx:evaluation of recurrent hematuria
dx:evaluation of recurrent hematuria

## 2025-06-15 NOTE — PROVIDER CONTACT NOTE (OTHER) - ACTION/TREATMENT ORDERED:
MD notified. tylenol PRN given as ordered MD notified. tylenol PRN given as ordered. blood cultures, urine culture , UA ordered as well

## 2025-06-15 NOTE — PROGRESS NOTE ADULT - SUBJECTIVE AND OBJECTIVE BOX
Progress Note    25 (3d)    Patient is a 40y old  Male who presents with a chief complaint of hematuria (15 Jose 2025 06:31)      Subjective / Overnight Events :  - No acute events overnight.  - Pt seen and examined at bedside.     Additional ROS (if any):    MEDICATIONS  (STANDING):  escitalopram 10 milliGRAM(s) Oral daily  pantoprazole    Tablet 40 milliGRAM(s) Oral before breakfast  polyethylene glycol 3350 17 Gram(s) Oral daily  rosuvastatin 20 milliGRAM(s) Oral at bedtime  senna 2 Tablet(s) Oral at bedtime  trimethoprim  160 mG/sulfamethoxazole 800 mG 1 Tablet(s) Oral every 12 hours    MEDICATIONS  (PRN):  acetaminophen     Tablet .. 650 milliGRAM(s) Oral every 6 hours PRN Temp greater or equal to 38C (100.4F), Mild Pain (1 - 3)  benzocaine/menthol Lozenge 1 Lozenge Oral once PRN Sore Throat  HYDROmorphone  Injectable 0.5 milliGRAM(s) IV Push every 6 hours PRN Severe Pain (7 - 10)  HYDROmorphone  Injectable 0.5 milliGRAM(s) IV Push every 10 minutes PRN Severe Pain (7 - 10)  oxyCODONE    IR 5 milliGRAM(s) Oral once PRN Moderate Pain (4 - 6)          PHYSICAL EXAM:  Vital Signs Last 24 Hrs  T(C): 36.6 (15 Jose 2025 05:17), Max: 38.4 (15 Jose 2025 00:56)  T(F): 97.9 (15 Jose 2025 05:17), Max: 101.2 (15 Jose 2025 00:56)  HR: 81 (15 Jose 2025 05:17) (59 - 115)  BP: 114/75 (15 Jose 2025 05:17) (107/66 - 140/93)  BP(mean): 102 (2025 17:15) (96 - 108)  RR: 18 (15 Jose 2025 05:17) (12 - 18)  SpO2: 98% (15 Jose 2025 05:17) (97% - 100%)    Parameters below as of 15 Jose 2025 05:17  Patient On (Oxygen Delivery Method): room air        I&O's Summary    2025 07:01  -  15 Jose 2025 07:00  --------------------------------------------------------  IN: 0 mL / OUT: 1500 mL / NET: -1500 mL        General: NAD, non-toxic appearing   HEENT: PERRLA, EOMi, no scleral icterus  CV: RRR, normal S1 and S2, no m/r/g  Lungs: normal respiratory effort. CTAB, no wheezes, rales, or rhonchi  Abd: soft, nontender, nondistended  Ext: no edema, 2+ peripheral pulses   Pysch: AAOx3, appropriate affect   Neuro: grossly non-focal, moving all extremities spontaneously   Skin: no rashes or lesions     LABS:  CAPILLARY BLOOD GLUCOSE                                  11.3   13.01 )-----------( 426      ( 15 Jose 2025 01:40 )             33.5       WBC Trend: 13.01<--, 8.07<--, 8.07<--  Hb Trend: 11.3<--, 11.5<--, 10.5<--, 11.0<--, 11.7<--    06-15    134[L]  |  97[L]  |  14  ----------------------------<  170[H]  3.9   |  23  |  1.24    Ca    8.9      15 Jose 2025 01:40  Phos  2.4     06-15  Mg     1.80     06-15    TPro  6.8  /  Alb  3.7  /  TBili  0.3  /  DBili  x   /  AST  15  /  ALT  11  /  AlkPhos  49  06-15    PT/INR - ( 2025 03:57 )   PT: 13.7 sec;   INR: 1.15 ratio         PTT - ( 2025 03:57 )  PTT:34.0 sec      Urinalysis Basic - ( 15 Jose 2025 01:45 )    Color: Yellow / Appearance: Clear / S.006 / pH: x  Gluc: x / Ketone: x  / Bili: Negative / Urobili: 0.2 mg/dL   Blood: x / Protein: Trace mg/dL / Nitrite: Negative   Leuk Esterase: Trace / RBC: 7 /HPF / WBC 2 /HPF   Sq Epi: x / Non Sq Epi: 0 /HPF / Bacteria: Negative /HPF        Urinalysis with Rflx Culture (collected 15 Jose 2025 01:45)          RADIOLOGY & ADDITIONAL TESTS: Reviewed Progress Note    25 (3d)    Patient is a 40y old  Male who presents with a chief complaint of hematuria (15 Jose 2025 06:31)      Subjective / Overnight Events :  Fever overnight, infectious workup sent.     Additional ROS (if any):    MEDICATIONS  (STANDING):  escitalopram 10 milliGRAM(s) Oral daily  pantoprazole    Tablet 40 milliGRAM(s) Oral before breakfast  polyethylene glycol 3350 17 Gram(s) Oral daily  rosuvastatin 20 milliGRAM(s) Oral at bedtime  senna 2 Tablet(s) Oral at bedtime  trimethoprim  160 mG/sulfamethoxazole 800 mG 1 Tablet(s) Oral every 12 hours    MEDICATIONS  (PRN):  acetaminophen     Tablet .. 650 milliGRAM(s) Oral every 6 hours PRN Temp greater or equal to 38C (100.4F), Mild Pain (1 - 3)  benzocaine/menthol Lozenge 1 Lozenge Oral once PRN Sore Throat  HYDROmorphone  Injectable 0.5 milliGRAM(s) IV Push every 6 hours PRN Severe Pain (7 - 10)  HYDROmorphone  Injectable 0.5 milliGRAM(s) IV Push every 10 minutes PRN Severe Pain (7 - 10)  oxyCODONE    IR 5 milliGRAM(s) Oral once PRN Moderate Pain (4 - 6)          PHYSICAL EXAM:  Vital Signs Last 24 Hrs  T(C): 36.6 (15 Jose 2025 05:17), Max: 38.4 (15 Jose 2025 00:56)  T(F): 97.9 (15 Jose 2025 05:17), Max: 101.2 (15 Jose 2025 00:56)  HR: 81 (15 Jose 2025 05:17) (59 - 115)  BP: 114/75 (15 Jose 2025 05:17) (107/66 - 140/93)  BP(mean): 102 (2025 17:15) (96 - 108)  RR: 18 (15 Jose 2025 05:17) (12 - 18)  SpO2: 98% (15 Jose 2025 05:17) (97% - 100%)    Parameters below as of 15 Jose 2025 05:17  Patient On (Oxygen Delivery Method): room air        I&O's Summary    2025 07:01  -  15 Jose 2025 07:00  --------------------------------------------------------  IN: 0 mL / OUT: 1500 mL / NET: -1500 mL        General: NAD, non-toxic appearing   HEENT: PERRLA, EOMi, no scleral icterus  CV: RRR, normal S1 and S2, no m/r/g  Lungs: normal respiratory effort. CTAB, no wheezes, rales, or rhonchi  Abd: soft, nontender, nondistended  Ext: no edema, 2+ peripheral pulses   Pysch: AAOx3, appropriate affect   Neuro: grossly non-focal, moving all extremities spontaneously   Skin: no rashes or lesions     LABS:  CAPILLARY BLOOD GLUCOSE                                  11.3   13.01 )-----------( 426      ( 15 Jose 2025 01:40 )             33.5       WBC Trend: 13.01<--, 8.07<--, 8.07<--  Hb Trend: 11.3<--, 11.5<--, 10.5<--, 11.0<--, 11.7<--    06-15    134[L]  |  97[L]  |  14  ----------------------------<  170[H]  3.9   |  23  |  1.24    Ca    8.9      15 Jose 2025 01:40  Phos  2.4     06-15  Mg     1.80     06-15    TPro  6.8  /  Alb  3.7  /  TBili  0.3  /  DBili  x   /  AST  15  /  ALT  11  /  AlkPhos  49  06-15    PT/INR - ( 2025 03:57 )   PT: 13.7 sec;   INR: 1.15 ratio         PTT - ( 2025 03:57 )  PTT:34.0 sec      Urinalysis Basic - ( 15 Jose 2025 01:45 )    Color: Yellow / Appearance: Clear / S.006 / pH: x  Gluc: x / Ketone: x  / Bili: Negative / Urobili: 0.2 mg/dL   Blood: x / Protein: Trace mg/dL / Nitrite: Negative   Leuk Esterase: Trace / RBC: 7 /HPF / WBC 2 /HPF   Sq Epi: x / Non Sq Epi: 0 /HPF / Bacteria: Negative /HPF        Urinalysis with Rflx Culture (collected 15 Jose 2025 01:45)          RADIOLOGY & ADDITIONAL TESTS: Reviewed

## 2025-06-15 NOTE — PROGRESS NOTE ADULT - SUBJECTIVE AND OBJECTIVE BOX
UROLOGY PROGRESS NOTE    24 HOUR/OVERNIGHT EVENTS:    SUBJECTIVE:  Patient seen and examined at bedside. Tolerating diet without n/v. Denies fevers, chills, SOB, CP.      OBJECTIVE:    Vital Signs:  T(F): , Max: 101.2 (06-15-25 @ 00:56)  HR: 81 (06-15-25 @ 05:17)  BP: 114/75 (06-15-25 @ 05:17)  SpO2: 98% (06-15-25 @ 05:17)  Wt(kg): --    Output:     OUT:    Indwelling Catheter - Urethral (mL): 1500 mL  Total OUT: 1500 mL    Total NET: -1500 mL          Physical Exam:  Gen: NAD  Abd: Soft, nontender, nondistended  : Voiding independently    Medications  MEDICATIONS  (STANDING):  escitalopram 10 milliGRAM(s) Oral daily  pantoprazole    Tablet 40 milliGRAM(s) Oral before breakfast  polyethylene glycol 3350 17 Gram(s) Oral daily  rosuvastatin 20 milliGRAM(s) Oral at bedtime  senna 2 Tablet(s) Oral at bedtime  trimethoprim  160 mG/sulfamethoxazole 800 mG 1 Tablet(s) Oral every 12 hours    MEDICATIONS  (PRN):  acetaminophen     Tablet .. 650 milliGRAM(s) Oral every 6 hours PRN Temp greater or equal to 38C (100.4F), Mild Pain (1 - 3)  benzocaine/menthol Lozenge 1 Lozenge Oral once PRN Sore Throat  HYDROmorphone  Injectable 0.5 milliGRAM(s) IV Push every 6 hours PRN Severe Pain (7 - 10)  HYDROmorphone  Injectable 0.5 milliGRAM(s) IV Push every 10 minutes PRN Severe Pain (7 - 10)  oxyCODONE    IR 5 milliGRAM(s) Oral once PRN Moderate Pain (4 - 6)      LABS:      06-15 @ 01:40    WBC 13.01 / Hct 33.5  / SCr 1.24     06-14 @ 03:57    WBC 8.07  / Hct 34.3  / SCr 1.36         Urinalysis with Rflx Culture (collected 06-15-25 @ 01:45)    Urinalysis with Rflx Culture (collected 06-11-25 @ 00:50)    Culture - Urine (collected 06-11-25 @ 00:50)  Source: Catheterized  Final Report (06-12-25 @ 14:37):    No growth          RADIOLOGY:                   UROLOGY PROGRESS NOTE    24 HOUR/OVERNIGHT EVENTS:  Febrile to 101.2 overnight, 115 HR    SUBJECTIVE:  Patient seen and examined at bedside. Tolerating diet without n/v. Denies chills, SOB, CP. Feels better and voiding independently without clot passage      OBJECTIVE:    Vital Signs:  T(F): , Max: 101.2 (06-15-25 @ 00:56)  HR: 81 (06-15-25 @ 05:17)  BP: 114/75 (06-15-25 @ 05:17)  SpO2: 98% (06-15-25 @ 05:17)  Wt(kg): --    Output:     OUT:    Indwelling Catheter - Urethral (mL): 1500 mL  Total OUT: 1500 mL    Total NET: -1500 mL          Physical Exam:  Gen: NAD  Abd: Soft, nontender, nondistended  : Voiding independently clear yellow urine, no clots    Medications  MEDICATIONS  (STANDING):  escitalopram 10 milliGRAM(s) Oral daily  pantoprazole    Tablet 40 milliGRAM(s) Oral before breakfast  polyethylene glycol 3350 17 Gram(s) Oral daily  rosuvastatin 20 milliGRAM(s) Oral at bedtime  senna 2 Tablet(s) Oral at bedtime  trimethoprim  160 mG/sulfamethoxazole 800 mG 1 Tablet(s) Oral every 12 hours    MEDICATIONS  (PRN):  acetaminophen     Tablet .. 650 milliGRAM(s) Oral every 6 hours PRN Temp greater or equal to 38C (100.4F), Mild Pain (1 - 3)  benzocaine/menthol Lozenge 1 Lozenge Oral once PRN Sore Throat  HYDROmorphone  Injectable 0.5 milliGRAM(s) IV Push every 6 hours PRN Severe Pain (7 - 10)  HYDROmorphone  Injectable 0.5 milliGRAM(s) IV Push every 10 minutes PRN Severe Pain (7 - 10)  oxyCODONE    IR 5 milliGRAM(s) Oral once PRN Moderate Pain (4 - 6)      LABS:      06-15 @ 01:40    WBC 13.01 / Hct 33.5  / SCr 1.24     06-14 @ 03:57    WBC 8.07  / Hct 34.3  / SCr 1.36         Urinalysis with Rflx Culture (collected 06-15-25 @ 01:45)    Urinalysis with Rflx Culture (collected 06-11-25 @ 00:50)    Culture - Urine (collected 06-11-25 @ 00:50)  Source: Catheterized  Final Report (06-12-25 @ 14:37):    No growth          RADIOLOGY:

## 2025-06-15 NOTE — PROVIDER CONTACT NOTE (OTHER) - SITUATION
dark red hematuria noted in newman after bladder irrigation stopped by urology
hematuria noted in newman
patient complaining of chills. temp=101.2, [place]=882, /66

## 2025-06-15 NOTE — PROVIDER CONTACT NOTE (OTHER) - ASSESSMENT
dark red hematuria noted in newman after bladder irrigation stopped by urology
patient complaining of chills.  temp=101.2, BF=404, /66
hematuria noted in newman

## 2025-06-15 NOTE — PROGRESS NOTE ADULT - PROBLEM SELECTOR PLAN 7
DVT PPx: SCDs iso hematuria   E: replete K<4, Mg<2  GI PPx: Pantoprazole 40 mg   Diet: DASH, NPO for cystoscopy   PT/OT: none  Code Status: Full  Dispo: pending medical improvement

## 2025-06-16 ENCOUNTER — TRANSCRIPTION ENCOUNTER (OUTPATIENT)
Age: 40
End: 2025-06-16

## 2025-06-16 VITALS
HEART RATE: 71 BPM | SYSTOLIC BLOOD PRESSURE: 117 MMHG | RESPIRATION RATE: 18 BRPM | OXYGEN SATURATION: 98 % | DIASTOLIC BLOOD PRESSURE: 73 MMHG | TEMPERATURE: 98 F

## 2025-06-16 LAB
ALBUMIN SERPL ELPH-MCNC: 3.8 G/DL — SIGNIFICANT CHANGE UP (ref 3.3–5)
ALP SERPL-CCNC: 46 U/L — SIGNIFICANT CHANGE UP (ref 40–120)
ALT FLD-CCNC: 13 U/L — SIGNIFICANT CHANGE UP (ref 4–41)
ANION GAP SERPL CALC-SCNC: 13 MMOL/L — SIGNIFICANT CHANGE UP (ref 7–14)
AST SERPL-CCNC: 14 U/L — SIGNIFICANT CHANGE UP (ref 4–40)
BASOPHILS # BLD AUTO: 0.04 K/UL — SIGNIFICANT CHANGE UP (ref 0–0.2)
BASOPHILS NFR BLD AUTO: 0.6 % — SIGNIFICANT CHANGE UP (ref 0–2)
BILIRUB SERPL-MCNC: 0.3 MG/DL — SIGNIFICANT CHANGE UP (ref 0.2–1.2)
BUN SERPL-MCNC: 12 MG/DL — SIGNIFICANT CHANGE UP (ref 7–23)
CALCIUM SERPL-MCNC: 9.4 MG/DL — SIGNIFICANT CHANGE UP (ref 8.4–10.5)
CHLORIDE SERPL-SCNC: 104 MMOL/L — SIGNIFICANT CHANGE UP (ref 98–107)
CO2 SERPL-SCNC: 24 MMOL/L — SIGNIFICANT CHANGE UP (ref 22–31)
CREAT SERPL-MCNC: 1.29 MG/DL — SIGNIFICANT CHANGE UP (ref 0.5–1.3)
EGFR: 71 ML/MIN/1.73M2 — SIGNIFICANT CHANGE UP
EGFR: 71 ML/MIN/1.73M2 — SIGNIFICANT CHANGE UP
EOSINOPHIL # BLD AUTO: 0.18 K/UL — SIGNIFICANT CHANGE UP (ref 0–0.5)
EOSINOPHIL NFR BLD AUTO: 2.7 % — SIGNIFICANT CHANGE UP (ref 0–6)
FACT IX PPP CHRO-ACNC: 49 % — LOW (ref 52–150)
FACT IX PPP CHRO-ACNC: 61.2 % — SIGNIFICANT CHANGE UP (ref 52–150)
FACT IX PPP CHRO-ACNC: 69.3 % — SIGNIFICANT CHANGE UP (ref 52–150)
GLUCOSE SERPL-MCNC: 96 MG/DL — SIGNIFICANT CHANGE UP (ref 70–99)
HCT VFR BLD CALC: 34.7 % — LOW (ref 39–50)
HGB BLD-MCNC: 11.6 G/DL — LOW (ref 13–17)
IANC: 3.53 K/UL — SIGNIFICANT CHANGE UP (ref 1.8–7.4)
IMM GRANULOCYTES NFR BLD AUTO: 0.5 % — SIGNIFICANT CHANGE UP (ref 0–0.9)
LYMPHOCYTES # BLD AUTO: 2.31 K/UL — SIGNIFICANT CHANGE UP (ref 1–3.3)
LYMPHOCYTES # BLD AUTO: 35.2 % — SIGNIFICANT CHANGE UP (ref 13–44)
MAGNESIUM SERPL-MCNC: 2.1 MG/DL — SIGNIFICANT CHANGE UP (ref 1.6–2.6)
MCHC RBC-ENTMCNC: 31.8 PG — SIGNIFICANT CHANGE UP (ref 27–34)
MCHC RBC-ENTMCNC: 33.4 G/DL — SIGNIFICANT CHANGE UP (ref 32–36)
MCV RBC AUTO: 95.1 FL — SIGNIFICANT CHANGE UP (ref 80–100)
MONOCYTES # BLD AUTO: 0.47 K/UL — SIGNIFICANT CHANGE UP (ref 0–0.9)
MONOCYTES NFR BLD AUTO: 7.2 % — SIGNIFICANT CHANGE UP (ref 2–14)
NEUTROPHILS # BLD AUTO: 3.53 K/UL — SIGNIFICANT CHANGE UP (ref 1.8–7.4)
NEUTROPHILS NFR BLD AUTO: 53.8 % — SIGNIFICANT CHANGE UP (ref 43–77)
NRBC # BLD AUTO: 0 K/UL — SIGNIFICANT CHANGE UP (ref 0–0)
NRBC # FLD: 0 K/UL — SIGNIFICANT CHANGE UP (ref 0–0)
NRBC BLD AUTO-RTO: 0 /100 WBCS — SIGNIFICANT CHANGE UP (ref 0–0)
PHOSPHATE SERPL-MCNC: 3.5 MG/DL — SIGNIFICANT CHANGE UP (ref 2.5–4.5)
PLATELET # BLD AUTO: 393 K/UL — SIGNIFICANT CHANGE UP (ref 150–400)
POTASSIUM SERPL-MCNC: 4.5 MMOL/L — SIGNIFICANT CHANGE UP (ref 3.5–5.3)
POTASSIUM SERPL-SCNC: 4.5 MMOL/L — SIGNIFICANT CHANGE UP (ref 3.5–5.3)
PROT SERPL-MCNC: 6.8 G/DL — SIGNIFICANT CHANGE UP (ref 6–8.3)
RBC # BLD: 3.65 M/UL — LOW (ref 4.2–5.8)
RBC # FLD: 11.6 % — SIGNIFICANT CHANGE UP (ref 10.3–14.5)
SODIUM SERPL-SCNC: 141 MMOL/L — SIGNIFICANT CHANGE UP (ref 135–145)
WBC # BLD: 6.56 K/UL — SIGNIFICANT CHANGE UP (ref 3.8–10.5)
WBC # FLD AUTO: 6.56 K/UL — SIGNIFICANT CHANGE UP (ref 3.8–10.5)

## 2025-06-16 PROCEDURE — 99239 HOSP IP/OBS DSCHRG MGMT >30: CPT | Mod: GC

## 2025-06-16 RX ORDER — CIPROFLOXACIN HCL 250 MG
1 TABLET ORAL
Qty: 12 | Refills: 0
Start: 2025-06-16 | End: 2025-06-21

## 2025-06-16 RX ORDER — ASPIRIN 325 MG
1 TABLET ORAL
Refills: 0 | DISCHARGE

## 2025-06-16 RX ADMIN — ESCITALOPRAM OXALATE 10 MILLIGRAM(S): 20 TABLET ORAL at 11:33

## 2025-06-16 RX ADMIN — Medication 40 MILLIGRAM(S): at 06:21

## 2025-06-16 RX ADMIN — CEFTRIAXONE 100 MILLIGRAM(S): 500 INJECTION, POWDER, FOR SOLUTION INTRAMUSCULAR; INTRAVENOUS at 11:32

## 2025-06-16 NOTE — DISCHARGE NOTE NURSING/CASE MANAGEMENT/SOCIAL WORK - FINANCIAL ASSISTANCE
[facility] provides services at a reduced cost to those who are determined to be eligible through [facility]’s financial assistance program. Information regarding [facility]’s financial assistance program can be found by going to [email or web address] or by calling [phone].

## 2025-06-16 NOTE — PROGRESS NOTE ADULT - ATTENDING COMMENTS
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 transfer for urology eval. pending cystoscopy/uretoscopy. hematology following for hemophilia management.     appreciate urology/hematology input    h/h and vital stable despite hematuria.   CBI currently clamped.     plan for OR today.     post op management per hematology and Urology team.  trend H&H and Factor XI lvl.
40M w/ hx Hemophilia B, CAD, HLD, nephrolithiasis p/w LLQ pain and hematuria. Found to have moderate left hydronephrosis w/ suspected blood clot in ureter. Pt was seen by urology and treated w/ CBI and s/p OR on 6/14 for clot evacuation. Course was c/b sepsis suspected 2/2 UTI     Pt currently stable and feeling well.     -F/u 6/15 blood cx for fever  -Pt on ceftriaxone. Urine cx was negative. However, per urology recs will dc pt on 7d course of ciprofloxacin given clinical picture   -Hemophilia management per hematology  -Of note pt was on ASA for CAD. Pt follows w/ a cardiologist. Given recent hematuria and hx of hemophilia will continue to hold for now. Pt will follow up as outpt.     Dc planning for today to home. Further details outlined in discharge documentation. Spent 37 min in discharge planning and coordination.
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 urine clear, H/H stable  - consulted- plan for OR cystoscopy, clot evacuation, and ureteroscopy 6/14  -factor ix level pending. hematology consulted for possible factor ix infusion prior to OR   -RCRI=0. MET>4. no active cardiac/pulm symptoms. low risk pt for low risk surgery. heme clearance pending factor ix level. pt is otherwise medically optimized  for planned procedure
40M with hemophilia B and hematuria, s/p cystoscopy for clot evacuation. post-op developed fever overnight with leukocytosis.     seen at bedside, well appearing, no complaints. no pain  urine is clear and yellow    - cultures send overnight.   - c/w empiric ceftriaxone.   - hemophilia management per hematology.

## 2025-06-16 NOTE — DISCHARGE NOTE NURSING/CASE MANAGEMENT/SOCIAL WORK - NSDCPEFALRISK_GEN_ALL_CORE
For information on Fall & Injury Prevention, visit: [email or web address] OR  [email or web address] OR  https://www.cdc.gov/steadi/patient.html

## 2025-06-16 NOTE — PROGRESS NOTE ADULT - PROBLEM SELECTOR PLAN 5
aspirin currently on hold in setting of hematuria  - c/w home statin  - ekg 6/14 no acute ST changes aspirin currently on hold in setting of hematuria  - c/w home statin  - ekg 6/14 no acute ST changes  - Patient never had myocardial ischemic for CAD, calcifications were seen on outpatient CT coronaries   - Will follow up with cardiologist and hematologist on when to restart ASA

## 2025-06-16 NOTE — DISCHARGE NOTE NURSING/CASE MANAGEMENT/SOCIAL WORK - PATIENT PORTAL LINK FT
You can access the FollowMyHealth Patient Portal offered by [facility] by registering at the following website: [email or web address]. By joining [facility]’s FollowMyHealth portal, you will also be able to view your health information using other applications (apps) compatible with our system.

## 2025-06-16 NOTE — PROGRESS NOTE ADULT - ASSESSMENT
41yo Male with h/o anxiety, GERD, HLD, CAD, L kidney calcifications s/p cystoscopy, laser incision of left lower pole calyceal narrowing, failed lithotripsy, stent placement (5/22/25, Dylan) with stent removal 5/29/25, gross hematuria with passage of clots as of 6/7, clot retention retaining ~800cc, requiring catheter, irrigation and CBI, newly diagnosed with Factor IX deficiency, transferred from [facility] for further urologic and hematologic management, remains on CBI, running clear, and is hemodynamically stable. WBC 9.9, H/H 11/32.5 from 11.7/35, Cr 1.3 from 1.3, UA (6/11) no growth. CTAP from 6/10 demonstrates mild/moderate L hydronephrosis with some blood product in L collecting system and in dependent portion of bladder.    6/14: Patient underwent cystoscopy, left retrograde pyelogram, left ureteroscopy due to persistent clot seen on CTAP and because this was patient's 3rd time back in hospital for clot colic. Patient received factor IX prior to OR per heme-onc and in the OR, small fragments of clot were irrigated out of bladder and left collecting system and each renal calyx was inspected to ensure hemostatic and clot-free. Was left without stent and without newman given minimal trauma to left ureter and excellent urine color.  6/15: febrile 101.2 at 1am, given tylenol, voiding clear yellow urine without clot, feels better.    Recommendations:  - No further urologic intervention indicated, should be d/c'ed with 7d course of ciprofloxacin   - Fever w/u per primary team > mild leukocytosis is expected after recent instrumentation, UA does not look grossly positive but ddx includes  source of infection from recent  procedure vs other fever etiology  - Continue to hydrate  - Pink-tinged urine is normal after this procedure  - OTC tylenol and ibuprofen alternating for pain control  - Follow up with Dr. Mendenhall outpatient    D/w Dr. Mendenhall    No further recommendations from urologic perspective, urology to sign off please reconsult as needed. For reconsults page [phone].

## 2025-06-16 NOTE — PROGRESS NOTE ADULT - PROBLEM SELECTOR PLAN 7
DVT PPx: SCDs iso hematuria   E: replete K<4, Mg<2  GI PPx: Pantoprazole 40 mg   Diet: DASH, NPO for cystoscopy   PT/OT: none  Code Status: Full  Dispo: pending medical improvement DVT PPx: SCDs iso hematuria   E: replete K<4, Mg<2  GI PPx: Pantoprazole 40 mg   Diet: DASH Diet   PT/OT: none  Code Status: Full  Dispo: home

## 2025-06-16 NOTE — PROGRESS NOTE ADULT - ASSESSMENT
39 y/o M with history of factor IX deficiency, CAD, HLD initially presenting to  ED for evaluation of recurrent hematuria. Pt was seen by urology and started on CBI. Urology recommended cystoscopy/ureteroscopy with possible clot evacuation. He was seen by hematology and was recommended to transfer to [facility] given Factor IX deficiency with need for procedure. Pt admitted for possibly cystoscopy/ureteroscopy w/ possible clot evacuation with need for possible Factor IX infusion prior to procedure.  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 Pt was seen by urology and started on CBI. Urology recommended cystoscopy/ureteroscopy with possible clot evacuation. He was seen by hematology and was recommended to transfer to [facility] given Factor IX deficiency with need for procedure. Pt admitted for possibly cystoscopy/ureteroscopy w/ possible clot evacuation with need for possible Factor IX infusion prior to procedure. S/p cystoscopy with clearance of clots. PRICILLA improving.

## 2025-06-16 NOTE — DISCHARGE NOTE NURSING/CASE MANAGEMENT/SOCIAL WORK - NSFLUVACAGEDISCH_IMM_ALL_CORE
Adult

## 2025-06-16 NOTE — PROGRESS NOTE ADULT - PROBLEM SELECTOR PLAN 1
Fever + WBC rise following procedure.   - on Bactrim as per urology   - infectious workup sent Fever + WBC rise following procedure.   - s/p one day of ceftriaxone   - UA 6/15 unremarkable  - BC 6/15 preliminary results no bacteria  - will dc home with total 7 days abx with ciprofloxacin

## 2025-06-16 NOTE — PROGRESS NOTE ADULT - PROBLEM/PLAN-6
DISPLAY PLAN FREE TEXT
allergic rxn vs V3 trigeminal neuralgia vs stroke. Unlikely stroke given pain along the face. Will give medicine, reassess. Most likely DC with f/u neurology.
DISPLAY PLAN FREE TEXT

## 2025-06-16 NOTE — PROGRESS NOTE ADULT - SUBJECTIVE AND OBJECTIVE BOX
Subjective  Denies fevers, chills, nausea, vomiting, SOB, CP. Voiding without issue.   Tolerating diet.    Objective    Vital signs  T(F): , Max: 98.2 (06-15-25 @ 12:49)  HR: 65 (06-16-25 @ 05:35)  BP: 118/71 (06-16-25 @ 05:35)  SpO2: 99% (06-16-25 @ 05:35)  Wt(kg): --    Output       Gen: NAD  Abd: soft, nontender, nondistended    Labs      06-16 @ 06:16    WBC --    / Hct 34.7  / SCr 1.29     06-15 @ 01:40    WBC 13.01 / Hct 33.5  / SCr 1.24         Urinalysis with Rflx Culture (collected 06-15-25 @ 01:45)    Urinalysis with Rflx Culture (collected 06-11-25 @ 00:50)    Culture - Urine (collected 06-11-25 @ 00:50)  Source: Catheterized  Final Report (06-12-25 @ 14:37):    No growth        Urine Cx: ?  Blood Cx: ?    Imaging

## 2025-06-16 NOTE — DISCHARGE NOTE NURSING/CASE MANAGEMENT/SOCIAL WORK - NSDCFUADDAPPT_GEN_ALL_CORE_FT
APPTS ARE READY TO BE MADE: [ ] YES    Best Family or Patient Contact (if needed):    Additional Information about above appointments (if needed):    1: PCP  2: Urology  3: Hematology  4. Cardiology    Other comments or requests:

## 2025-06-16 NOTE — PROGRESS NOTE ADULT - PROBLEM SELECTOR PLAN 3
heme consult emailed for possible factor replacement in setting of hematuria and prior to procedure as above   - recently diagnosed within the past few weeks.  - heme recs as above to optimize factor ix prior to cystoscopy heme consult emailed for possible factor replacement in setting of hematuria and prior to procedure as above   - recently diagnosed within the past few weeks.  - s/p factor IX 1500 on 6/14   - f/u with hematology outpatient

## 2025-06-16 NOTE — PROGRESS NOTE ADULT - PROBLEM SELECTOR PLAN 2
- L kidney calcifications s/p cystoscopy, laser incision of left lower pole calyceal narrowing, failed lithotripsy, stent placement (5/22/25, Dylan) with stent removal 5/29/25, gross hematuria with passage of clots as of 6/7  - Pt w/ hematuria, s/p newman and CBI.   - Ucx negative, pt afebrile w/o leukocytosis, monitoring off abx    PLAN:   - plan for benefix 1500u prior to cystoscopy 1hr, will get factor IX assay 15 minutes after administering benefix per heme  - f/u urology timing of cystoscopy today  - cw newman and CBI  - will f/u heme regarding more benefix following procedure as per their note - L kidney calcifications s/p cystoscopy, laser incision of left lower pole calyceal narrowing, failed lithotripsy, stent placement (5/22/25, Dylan) with stent removal 5/29/25, gross hematuria with passage of clots as of 6/7  - Pt w/ hematuria, s/p newman and CBI.   - Ucx negative, pt afebrile w/o leukocytosis, monitoring off abx  - s/p 1500 u benefix prior to cystoscopy    PLAN:   - s/p cystoscopy on 6/14 with clearance of clots  - dc newman and CBI   - f/u urology outpatient

## 2025-06-20 LAB
CULTURE RESULTS: SIGNIFICANT CHANGE UP
CULTURE RESULTS: SIGNIFICANT CHANGE UP
SPECIMEN SOURCE: SIGNIFICANT CHANGE UP
SPECIMEN SOURCE: SIGNIFICANT CHANGE UP

## 2025-06-27 ENCOUNTER — APPOINTMENT (OUTPATIENT)
Dept: HEMOPHILIA TREATMENT | Facility: HOSPITAL | Age: 40
End: 2025-06-27

## 2025-06-27 ENCOUNTER — LABORATORY RESULT (OUTPATIENT)
Age: 40
End: 2025-06-27

## 2025-06-27 ENCOUNTER — OUTPATIENT (OUTPATIENT)
Dept: OUTPATIENT SERVICES | Age: 40
LOS: 1 days | End: 2025-06-27

## 2025-06-27 VITALS
RESPIRATION RATE: 16 BRPM | DIASTOLIC BLOOD PRESSURE: 72 MMHG | SYSTOLIC BLOOD PRESSURE: 116 MMHG | WEIGHT: 198.42 LBS | HEIGHT: 73 IN | BODY MASS INDEX: 26.3 KG/M2 | HEART RATE: 67 BPM | TEMPERATURE: 98.5 F

## 2025-06-27 DIAGNOSIS — K08.409 PARTIAL LOSS OF TEETH, UNSPECIFIED CAUSE, UNSPECIFIED CLASS: Chronic | ICD-10-CM

## 2025-06-27 DIAGNOSIS — D66 HEREDITARY FACTOR VIII DEFICIENCY: ICD-10-CM

## 2025-06-27 PROBLEM — Z13.9 SCREENING FOR CONDITION: Status: ACTIVE | Noted: 2025-06-27

## 2025-07-02 DIAGNOSIS — Z13.9 ENCOUNTER FOR SCREENING, UNSPECIFIED: ICD-10-CM

## 2025-07-03 PROBLEM — D67 MILD FACTOR IX DEFICIENCY: Status: ACTIVE | Noted: 2025-07-03

## 2025-07-03 LAB
APTT 2H P 1:4 NP PPP: 32.8 SEC
APTT 2H P INC PPP: 34.9 SEC
APTT BLD: 37.8 SEC
APTT IMM NP/PRE NP PPP: 32.9 SEC
APTT INV RATIO PPP: 37.8 SEC
FACT INHIB XXX PPP-ACNC: NORMAL BU
FACT IX ACT/NOR PPP: 41.2 %
FACTOR TESTED: NORMAL
FERRITIN SERPL-MCNC: 225 NG/ML
FIBRINOGEN PPP-MCNC: 291 MG/DL
IMMATURE RETICULOCYTE FRACTION %: 12.6 %
INR PPP: 1.04 RATIO
IRON SATN MFR SERPL: 27 %
IRON SERPL-MCNC: 78 UG/DL
NPP NORMAL POOLED PLASMA: 31 SEC
PT BLD: 12.4 SEC
RBC # BLD: 4.03 M/UL
RETICS # AUTO: 1.4 %
RETICS AGGREG/RBC NFR: 57.2 K/UL
RETICULOCYTE HEMOGLOBIN EQUIVALENT: 34.3 PG
TIBC SERPL-MCNC: 292 UG/DL
TT CONT PPP: 16.2 SEC
UIBC SERPL-MCNC: 214 UG/DL

## 2025-07-11 RX ORDER — COAGULATION FACTOR IX (RECOMBINANT) 3000 UNIT
3000 KIT INTRAVENOUS
Qty: 3 | Refills: 0 | Status: ACTIVE | COMMUNITY
Start: 2025-07-11 | End: 1900-01-01

## 2025-09-18 ENCOUNTER — APPOINTMENT (OUTPATIENT)
Dept: INTERNAL MEDICINE | Facility: CLINIC | Age: 40
End: 2025-09-18

## 2025-09-18 VITALS
DIASTOLIC BLOOD PRESSURE: 80 MMHG | TEMPERATURE: 97.6 F | WEIGHT: 206 LBS | BODY MASS INDEX: 27.3 KG/M2 | RESPIRATION RATE: 14 BRPM | SYSTOLIC BLOOD PRESSURE: 126 MMHG | OXYGEN SATURATION: 98 % | HEIGHT: 73 IN | HEART RATE: 56 BPM

## 2025-09-18 DIAGNOSIS — E78.5 HYPERLIPIDEMIA, UNSPECIFIED: ICD-10-CM

## 2025-09-18 DIAGNOSIS — K64.9 UNSPECIFIED HEMORRHOIDS: ICD-10-CM

## 2025-09-18 PROCEDURE — 99214 OFFICE O/P EST MOD 30 MIN: CPT

## 2025-09-18 PROCEDURE — 36415 COLL VENOUS BLD VENIPUNCTURE: CPT

## 2025-09-18 RX ORDER — HYDROCORTISONE 2.5% 25 MG/G
2.5 CREAM TOPICAL
Qty: 1 | Refills: 5 | Status: ACTIVE | COMMUNITY
Start: 2025-09-18 | End: 1900-01-01

## 2025-09-20 ENCOUNTER — LABORATORY RESULT (OUTPATIENT)
Age: 40
End: 2025-09-20

## 2025-09-22 LAB
ALBUMIN SERPL ELPH-MCNC: 4.5 G/DL
ALP BLD-CCNC: 53 U/L
ALT SERPL-CCNC: 27 U/L
ANION GAP SERPL CALC-SCNC: 13 MMOL/L
APPEARANCE: ABNORMAL
AST SERPL-CCNC: 26 U/L
BILIRUB SERPL-MCNC: 0.5 MG/DL
BILIRUBIN URINE: NEGATIVE
BLOOD URINE: NEGATIVE
BUN SERPL-MCNC: 17 MG/DL
CALCIUM SERPL-MCNC: 9.8 MG/DL
CHLORIDE SERPL-SCNC: 103 MMOL/L
CHOLEST SERPL-MCNC: 122 MG/DL
CO2 SERPL-SCNC: 25 MMOL/L
COLOR: YELLOW
CREAT SERPL-MCNC: 0.88 MG/DL
EGFRCR SERPLBLD CKD-EPI 2021: 110 ML/MIN/1.73M2
ESTIMATED AVERAGE GLUCOSE: 97 MG/DL
FERRITIN SERPL-MCNC: 75 NG/ML
FOLATE SERPL-MCNC: 13.1 NG/ML
GLUCOSE QUALITATIVE U: NEGATIVE MG/DL
GLUCOSE SERPL-MCNC: 79 MG/DL
HBA1C MFR BLD HPLC: 5 %
HCT VFR BLD CALC: 41.3 %
HDLC SERPL-MCNC: 32 MG/DL
HGB BLD-MCNC: 13.4 G/DL
IRON SATN MFR SERPL: 28 %
IRON SERPL-MCNC: 90 UG/DL
KETONES URINE: NEGATIVE MG/DL
LDLC SERPL-MCNC: 74 MG/DL
LEUKOCYTE ESTERASE URINE: ABNORMAL
MCHC RBC-ENTMCNC: 30.9 PG
MCHC RBC-ENTMCNC: 32.4 G/DL
MCV RBC AUTO: 95.2 FL
NITRITE URINE: NEGATIVE
NONHDLC SERPL-MCNC: 90 MG/DL
PH URINE: 7.5
PLATELET # BLD AUTO: 315 K/UL
POTASSIUM SERPL-SCNC: 4.7 MMOL/L
PROT SERPL-MCNC: 7.2 G/DL
PROTEIN URINE: NEGATIVE MG/DL
PSA SERPL-MCNC: 0.69 NG/ML
RBC # BLD: 4.34 M/UL
RBC # FLD: 12.2 %
SODIUM SERPL-SCNC: 140 MMOL/L
SPECIFIC GRAVITY URINE: 1.02
TIBC SERPL-MCNC: 322 UG/DL
TRIGL SERPL-MCNC: 79 MG/DL
TSH SERPL-ACNC: 3.67 UIU/ML
UIBC SERPL-MCNC: 232 UG/DL
UROBILINOGEN URINE: 0.2 MG/DL
VIT B12 SERPL-MCNC: 669 PG/ML
WBC # FLD AUTO: 6.18 K/UL

## (undated) DEVICE — GLV 7 PROTEXIS (WHITE)

## (undated) DEVICE — SOL IRR BAG H2O 3000ML

## (undated) DEVICE — SEAL BIOPSY PORT ADJUSTABLE UP TO 9F

## (undated) DEVICE — GOWN LG

## (undated) DEVICE — WARMING BLANKET UPPER ADULT

## (undated) DEVICE — DRAPE C ARM UNIVERSAL

## (undated) DEVICE — PLV-SCD MACHINE: Type: DURABLE MEDICAL EQUIPMENT

## (undated) DEVICE — POSITIONER STRAP ARMBOARD VELCRO TS-30

## (undated) DEVICE — SOL IRR POUR H2O 1500ML

## (undated) DEVICE — DRAPE SNAP KOVER 36X28

## (undated) DEVICE — FOLEY TRAY 14FR 5CC LTX UMETER CLOSED

## (undated) DEVICE — UROVAC

## (undated) DEVICE — TUBING LEVEL ONE NORMOFLO SET

## (undated) DEVICE — CABLE DAC ACTIVE CORD

## (undated) DEVICE — PLV-LASER - LUMENIS PULSE MOSES 2.0 1468: Type: DURABLE MEDICAL EQUIPMENT

## (undated) DEVICE — ONCORE 26 INSUFLATION DEVICE

## (undated) DEVICE — FLOORMAT SURGISAFE ABSORBANT WHITE 36" X 72"

## (undated) DEVICE — VENODYNE/SCD SLEEVE CALF LARGE

## (undated) DEVICE — SOL INJ NS 0.9% 1000ML

## (undated) DEVICE — PACK CYSTO

## (undated) DEVICE — ELCTR GROUNDING PAD ADULT COVIDIEN

## (undated) DEVICE — PRESSURE INFUSOR BAG 1000ML

## (undated) DEVICE — SOL IRR BAG NS 0.9% 3000ML

## (undated) DEVICE — DRAPE TOWEL BLUE 17" X 24"

## (undated) DEVICE — SOL IRR POUR H2O 1000ML

## (undated) DEVICE — VENODYNE/SCD SLEEVE CALF MEDIUM

## (undated) DEVICE — BAG URINE W METER 2L

## (undated) DEVICE — SYR TOOMEY 50ML

## (undated) DEVICE — SYR LUER LOK 10CC

## (undated) DEVICE — TUBING THERMADX UROLOGY

## (undated) DEVICE — DRAPE DRAINAGE BAG URO CATCH II

## (undated) DEVICE — CANISTER DISPOSABLE THIN WALL 3000CC

## (undated) DEVICE — CLAMP BX URETERSP FLEX 1MM 15CM